# Patient Record
Sex: FEMALE | Race: ASIAN | NOT HISPANIC OR LATINO | Employment: FULL TIME | URBAN - METROPOLITAN AREA
[De-identification: names, ages, dates, MRNs, and addresses within clinical notes are randomized per-mention and may not be internally consistent; named-entity substitution may affect disease eponyms.]

---

## 2018-01-16 ENCOUNTER — OFFICE VISIT - HEALTHEAST (OUTPATIENT)
Dept: FAMILY MEDICINE | Facility: CLINIC | Age: 38
End: 2018-01-16

## 2018-01-16 DIAGNOSIS — S09.90XS HEAD INJURY, CLOSED, SEQUELA: ICD-10-CM

## 2018-01-16 ASSESSMENT — MIFFLIN-ST. JEOR: SCORE: 1586.29

## 2018-05-27 ENCOUNTER — HEALTH MAINTENANCE LETTER (OUTPATIENT)
Age: 38
End: 2018-05-27

## 2020-02-24 ENCOUNTER — COMMUNICATION - HEALTHEAST (OUTPATIENT)
Dept: TELEHEALTH | Facility: CLINIC | Age: 40
End: 2020-02-24

## 2020-02-24 ENCOUNTER — RECORDS - HEALTHEAST (OUTPATIENT)
Dept: GENERAL RADIOLOGY | Facility: CLINIC | Age: 40
End: 2020-02-24

## 2020-02-24 ENCOUNTER — OFFICE VISIT - HEALTHEAST (OUTPATIENT)
Dept: FAMILY MEDICINE | Facility: CLINIC | Age: 40
End: 2020-02-24

## 2020-02-24 DIAGNOSIS — Z00.00 ROUTINE GENERAL MEDICAL EXAMINATION AT A HEALTH CARE FACILITY: ICD-10-CM

## 2020-02-24 DIAGNOSIS — S99.921A FOOT INJURY, RIGHT, INITIAL ENCOUNTER: ICD-10-CM

## 2020-02-24 DIAGNOSIS — Z23 NEED FOR INFLUENZA VACCINATION: ICD-10-CM

## 2020-02-24 DIAGNOSIS — Z11.4 SCREENING FOR HUMAN IMMUNODEFICIENCY VIRUS WITHOUT PRESENCE OF RISK FACTORS: ICD-10-CM

## 2020-02-24 DIAGNOSIS — N89.8 VAGINAL DISCHARGE: ICD-10-CM

## 2020-02-24 DIAGNOSIS — R07.89 ATYPICAL CHEST PAIN: ICD-10-CM

## 2020-02-24 DIAGNOSIS — Z00.00 ENCOUNTER FOR GENERAL ADULT MEDICAL EXAMINATION WITHOUT ABNORMAL FINDINGS: ICD-10-CM

## 2020-02-24 DIAGNOSIS — R07.89 OTHER CHEST PAIN: ICD-10-CM

## 2020-02-24 LAB
ALBUMIN SERPL-MCNC: 3.4 G/DL (ref 3.5–5)
ALP SERPL-CCNC: 72 U/L (ref 45–120)
ALT SERPL W P-5'-P-CCNC: 11 U/L (ref 0–45)
ANION GAP SERPL CALCULATED.3IONS-SCNC: 11 MMOL/L (ref 5–18)
AST SERPL W P-5'-P-CCNC: 12 U/L (ref 0–40)
ATRIAL RATE - MUSE: 74 BPM
BASOPHILS # BLD AUTO: 0 THOU/UL (ref 0–0.2)
BASOPHILS NFR BLD AUTO: 0 % (ref 0–2)
BILIRUB SERPL-MCNC: 0.4 MG/DL (ref 0–1)
BUN SERPL-MCNC: 13 MG/DL (ref 8–22)
CALCIUM SERPL-MCNC: 9.1 MG/DL (ref 8.5–10.5)
CHLORIDE BLD-SCNC: 104 MMOL/L (ref 98–107)
CHOLEST SERPL-MCNC: 158 MG/DL
CLUE CELLS: NORMAL
CO2 SERPL-SCNC: 23 MMOL/L (ref 22–31)
CREAT SERPL-MCNC: 0.76 MG/DL (ref 0.6–1.1)
DIASTOLIC BLOOD PRESSURE - MUSE: NORMAL
EOSINOPHIL # BLD AUTO: 0.1 THOU/UL (ref 0–0.4)
EOSINOPHIL NFR BLD AUTO: 2 % (ref 0–6)
ERYTHROCYTE [DISTWIDTH] IN BLOOD BY AUTOMATED COUNT: 12.9 % (ref 11–14.5)
FASTING STATUS PATIENT QL REPORTED: YES
GFR SERPL CREATININE-BSD FRML MDRD: >60 ML/MIN/1.73M2
GLUCOSE BLD-MCNC: 89 MG/DL (ref 70–125)
HCT VFR BLD AUTO: 35.8 % (ref 35–47)
HDLC SERPL-MCNC: 38 MG/DL
HGB BLD-MCNC: 11.8 G/DL (ref 12–16)
HIV 1+2 AB+HIV1 P24 AG SERPL QL IA: NEGATIVE
INTERPRETATION ECG - MUSE: NORMAL
LDLC SERPL CALC-MCNC: 88 MG/DL
LYMPHOCYTES # BLD AUTO: 1.7 THOU/UL (ref 0.8–4.4)
LYMPHOCYTES NFR BLD AUTO: 26 % (ref 20–40)
MCH RBC QN AUTO: 27.3 PG (ref 27–34)
MCHC RBC AUTO-ENTMCNC: 32.9 G/DL (ref 32–36)
MCV RBC AUTO: 83 FL (ref 80–100)
MONOCYTES # BLD AUTO: 0.4 THOU/UL (ref 0–0.9)
MONOCYTES NFR BLD AUTO: 6 % (ref 2–10)
NEUTROPHILS # BLD AUTO: 4.4 THOU/UL (ref 2–7.7)
NEUTROPHILS NFR BLD AUTO: 66 % (ref 50–70)
P AXIS - MUSE: 26 DEGREES
PLATELET # BLD AUTO: 268 THOU/UL (ref 140–440)
PMV BLD AUTO: 8.3 FL (ref 7–10)
POTASSIUM BLD-SCNC: 4.1 MMOL/L (ref 3.5–5)
PR INTERVAL - MUSE: 150 MS
PROT SERPL-MCNC: 7.2 G/DL (ref 6–8)
QRS DURATION - MUSE: 86 MS
QT - MUSE: 414 MS
QTC - MUSE: 459 MS
R AXIS - MUSE: 16 DEGREES
RBC # BLD AUTO: 4.31 MILL/UL (ref 3.8–5.4)
SODIUM SERPL-SCNC: 138 MMOL/L (ref 136–145)
SYSTOLIC BLOOD PRESSURE - MUSE: NORMAL
T AXIS - MUSE: -6 DEGREES
TRICHOMONAS, WET PREP: NORMAL
TRIGL SERPL-MCNC: 158 MG/DL
TSH SERPL DL<=0.005 MIU/L-ACNC: 1.49 UIU/ML (ref 0.3–5)
VENTRICULAR RATE- MUSE: 74 BPM
WBC: 6.6 THOU/UL (ref 4–11)
YEAST, WET PREP: NORMAL

## 2020-02-24 ASSESSMENT — MIFFLIN-ST. JEOR: SCORE: 1517.97

## 2020-02-25 ENCOUNTER — COMMUNICATION - HEALTHEAST (OUTPATIENT)
Dept: FAMILY MEDICINE | Facility: CLINIC | Age: 40
End: 2020-02-25

## 2020-02-25 LAB
HPV SOURCE: NORMAL
HUMAN PAPILLOMA VIRUS 16 DNA: NEGATIVE
HUMAN PAPILLOMA VIRUS 18 DNA: NEGATIVE
HUMAN PAPILLOMA VIRUS FINAL DIAGNOSIS: NORMAL
HUMAN PAPILLOMA VIRUS OTHER HR: NEGATIVE
SPECIMEN DESCRIPTION: NORMAL

## 2020-03-02 ENCOUNTER — COMMUNICATION - HEALTHEAST (OUTPATIENT)
Dept: FAMILY MEDICINE | Facility: CLINIC | Age: 40
End: 2020-03-02

## 2020-03-02 LAB
BKR LAB AP ABNORMAL BLEEDING: NO
BKR LAB AP BIRTH CONTROL/HORMONES: NORMAL
BKR LAB AP CERVICAL APPEARANCE: NORMAL
BKR LAB AP GYN ADEQUACY: NORMAL
BKR LAB AP GYN INTERPRETATION: NORMAL
BKR LAB AP HPV REFLEX: NORMAL
BKR LAB AP LMP: NORMAL
BKR LAB AP PATIENT STATUS: NORMAL
BKR LAB AP PREVIOUS ABNORMAL: NORMAL
BKR LAB AP PREVIOUS NORMAL: 2015
HIGH RISK?: NO
PATH REPORT.COMMENTS IMP SPEC: NORMAL
RESULT FLAG (HE HISTORICAL CONVERSION): NORMAL

## 2020-06-04 ENCOUNTER — AMBULATORY - HEALTHEAST (OUTPATIENT)
Dept: FAMILY MEDICINE | Facility: CLINIC | Age: 40
End: 2020-06-04

## 2020-06-04 ENCOUNTER — COMMUNICATION - HEALTHEAST (OUTPATIENT)
Dept: SCHEDULING | Facility: CLINIC | Age: 40
End: 2020-06-04

## 2020-06-04 DIAGNOSIS — Z20.822 EXPOSURE TO 2019 NOVEL CORONAVIRUS: ICD-10-CM

## 2020-06-08 ENCOUNTER — AMBULATORY - HEALTHEAST (OUTPATIENT)
Dept: FAMILY MEDICINE | Facility: CLINIC | Age: 40
End: 2020-06-08

## 2020-06-08 DIAGNOSIS — Z20.822 EXPOSURE TO 2019 NOVEL CORONAVIRUS: ICD-10-CM

## 2020-06-10 ENCOUNTER — COMMUNICATION - HEALTHEAST (OUTPATIENT)
Dept: FAMILY MEDICINE | Facility: CLINIC | Age: 40
End: 2020-06-10

## 2020-08-10 ENCOUNTER — COMMUNICATION - HEALTHEAST (OUTPATIENT)
Dept: SCHEDULING | Facility: CLINIC | Age: 40
End: 2020-08-10

## 2020-08-17 ENCOUNTER — OFFICE VISIT - HEALTHEAST (OUTPATIENT)
Dept: FAMILY MEDICINE | Facility: CLINIC | Age: 40
End: 2020-08-17

## 2020-08-17 DIAGNOSIS — R79.89 ELEVATED TSH: ICD-10-CM

## 2020-08-17 DIAGNOSIS — R06.83 SNORING: ICD-10-CM

## 2020-08-17 DIAGNOSIS — R03.0 ELEVATED BLOOD PRESSURE READING WITHOUT DIAGNOSIS OF HYPERTENSION: ICD-10-CM

## 2020-08-17 DIAGNOSIS — R07.89 ATYPICAL CHEST PAIN: ICD-10-CM

## 2020-08-17 DIAGNOSIS — F43.23 ADJUSTMENT DISORDER WITH MIXED ANXIETY AND DEPRESSED MOOD: ICD-10-CM

## 2020-08-17 DIAGNOSIS — E66.01 MORBID OBESITY (H): ICD-10-CM

## 2020-08-17 LAB
ALBUMIN SERPL-MCNC: 3.7 G/DL (ref 3.5–5)
ALP SERPL-CCNC: 71 U/L (ref 45–120)
ALT SERPL W P-5'-P-CCNC: 12 U/L (ref 0–45)
ANION GAP SERPL CALCULATED.3IONS-SCNC: 10 MMOL/L (ref 5–18)
AST SERPL W P-5'-P-CCNC: 14 U/L (ref 0–40)
BILIRUB SERPL-MCNC: 0.6 MG/DL (ref 0–1)
BUN SERPL-MCNC: 11 MG/DL (ref 8–22)
CALCIUM SERPL-MCNC: 8.6 MG/DL (ref 8.5–10.5)
CHLORIDE BLD-SCNC: 107 MMOL/L (ref 98–107)
CO2 SERPL-SCNC: 25 MMOL/L (ref 22–31)
CREAT SERPL-MCNC: 0.82 MG/DL (ref 0.6–1.1)
GFR SERPL CREATININE-BSD FRML MDRD: >60 ML/MIN/1.73M2
GLUCOSE BLD-MCNC: 96 MG/DL (ref 70–125)
POTASSIUM BLD-SCNC: 4.1 MMOL/L (ref 3.5–5)
PROT SERPL-MCNC: 7.6 G/DL (ref 6–8)
SODIUM SERPL-SCNC: 142 MMOL/L (ref 136–145)
TSH SERPL DL<=0.005 MIU/L-ACNC: 4.97 UIU/ML (ref 0.3–5)

## 2020-08-17 ASSESSMENT — ANXIETY QUESTIONNAIRES
3. WORRYING TOO MUCH ABOUT DIFFERENT THINGS: SEVERAL DAYS
1. FEELING NERVOUS, ANXIOUS, OR ON EDGE: SEVERAL DAYS
GAD7 TOTAL SCORE: 8
IF YOU CHECKED OFF ANY PROBLEMS ON THIS QUESTIONNAIRE, HOW DIFFICULT HAVE THESE PROBLEMS MADE IT FOR YOU TO DO YOUR WORK, TAKE CARE OF THINGS AT HOME, OR GET ALONG WITH OTHER PEOPLE: VERY DIFFICULT
5. BEING SO RESTLESS THAT IT IS HARD TO SIT STILL: SEVERAL DAYS
7. FEELING AFRAID AS IF SOMETHING AWFUL MIGHT HAPPEN: NEARLY EVERY DAY
6. BECOMING EASILY ANNOYED OR IRRITABLE: NOT AT ALL
4. TROUBLE RELAXING: SEVERAL DAYS
2. NOT BEING ABLE TO STOP OR CONTROL WORRYING: SEVERAL DAYS

## 2020-08-17 ASSESSMENT — PATIENT HEALTH QUESTIONNAIRE - PHQ9: SUM OF ALL RESPONSES TO PHQ QUESTIONS 1-9: 9

## 2020-08-18 ENCOUNTER — AMBULATORY - HEALTHEAST (OUTPATIENT)
Dept: CARDIOLOGY | Facility: CLINIC | Age: 40
End: 2020-08-18

## 2020-08-18 ENCOUNTER — COMMUNICATION - HEALTHEAST (OUTPATIENT)
Dept: CARDIOLOGY | Facility: CLINIC | Age: 40
End: 2020-08-18

## 2020-08-19 ENCOUNTER — OFFICE VISIT - HEALTHEAST (OUTPATIENT)
Dept: CARDIOLOGY | Facility: CLINIC | Age: 40
End: 2020-08-19

## 2020-08-19 DIAGNOSIS — I25.9 CHEST PAIN DUE TO MYOCARDIAL ISCHEMIA, UNSPECIFIED ISCHEMIC CHEST PAIN TYPE: ICD-10-CM

## 2020-08-19 ASSESSMENT — MIFFLIN-ST. JEOR: SCORE: 1508.9

## 2020-08-27 ENCOUNTER — HOSPITAL ENCOUNTER (OUTPATIENT)
Dept: CT IMAGING | Facility: CLINIC | Age: 40
Discharge: HOME | End: 2020-08-27
Attending: INTERNAL MEDICINE

## 2020-08-27 DIAGNOSIS — I25.9 CHEST PAIN DUE TO MYOCARDIAL ISCHEMIA, UNSPECIFIED ISCHEMIC CHEST PAIN TYPE: ICD-10-CM

## 2020-08-27 LAB
BSA FOR ECHO PROCEDURE: 1.96 M2
CV CALCIUM SCORE AGATSTON LM: 0
CV CALCIUM SCORING AGATSON LAD: 0
CV CALCIUM SCORING AGATSTON CX: 0
CV CALCIUM SCORING AGATSTON RCA: 0
CV CALCIUM SCORING AGATSTON TOTAL: 0
LEFT VENTRICLE HEART RATE: 75 BPM

## 2020-08-27 ASSESSMENT — MIFFLIN-ST. JEOR: SCORE: 1503.23

## 2020-09-08 ENCOUNTER — OFFICE VISIT - HEALTHEAST (OUTPATIENT)
Dept: FAMILY MEDICINE | Facility: CLINIC | Age: 40
End: 2020-09-08

## 2020-09-08 ENCOUNTER — COMMUNICATION - HEALTHEAST (OUTPATIENT)
Dept: FAMILY MEDICINE | Facility: CLINIC | Age: 40
End: 2020-09-08

## 2020-09-08 DIAGNOSIS — R94.6 THYROID FUNCTION TEST ABNORMAL: ICD-10-CM

## 2020-09-08 DIAGNOSIS — F43.23 ADJUSTMENT DISORDER WITH MIXED ANXIETY AND DEPRESSED MOOD: ICD-10-CM

## 2020-09-14 ENCOUNTER — OFFICE VISIT - HEALTHEAST (OUTPATIENT)
Dept: BEHAVIORAL HEALTH | Facility: CLINIC | Age: 40
End: 2020-09-14

## 2020-09-14 DIAGNOSIS — F41.0 PANIC ATTACKS: ICD-10-CM

## 2020-09-14 DIAGNOSIS — F41.1 GENERALIZED ANXIETY DISORDER: ICD-10-CM

## 2020-09-14 ASSESSMENT — ANXIETY QUESTIONNAIRES
4. TROUBLE RELAXING: NOT AT ALL
IF YOU CHECKED OFF ANY PROBLEMS ON THIS QUESTIONNAIRE, HOW DIFFICULT HAVE THESE PROBLEMS MADE IT FOR YOU TO DO YOUR WORK, TAKE CARE OF THINGS AT HOME, OR GET ALONG WITH OTHER PEOPLE: NOT DIFFICULT AT ALL
3. WORRYING TOO MUCH ABOUT DIFFERENT THINGS: SEVERAL DAYS
1. FEELING NERVOUS, ANXIOUS, OR ON EDGE: SEVERAL DAYS
5. BEING SO RESTLESS THAT IT IS HARD TO SIT STILL: NOT AT ALL
GAD7 TOTAL SCORE: 6
6. BECOMING EASILY ANNOYED OR IRRITABLE: NOT AT ALL
7. FEELING AFRAID AS IF SOMETHING AWFUL MIGHT HAPPEN: NEARLY EVERY DAY
2. NOT BEING ABLE TO STOP OR CONTROL WORRYING: SEVERAL DAYS

## 2020-09-14 ASSESSMENT — PATIENT HEALTH QUESTIONNAIRE - PHQ9: SUM OF ALL RESPONSES TO PHQ QUESTIONS 1-9: 0

## 2020-10-12 ENCOUNTER — COMMUNICATION - HEALTHEAST (OUTPATIENT)
Dept: FAMILY MEDICINE | Facility: CLINIC | Age: 40
End: 2020-10-12

## 2020-10-12 DIAGNOSIS — R03.0 ELEVATED BLOOD PRESSURE READING WITHOUT DIAGNOSIS OF HYPERTENSION: ICD-10-CM

## 2020-10-21 ENCOUNTER — AMBULATORY - HEALTHEAST (OUTPATIENT)
Dept: NURSING | Facility: CLINIC | Age: 40
End: 2020-10-21

## 2020-10-21 ENCOUNTER — AMBULATORY - HEALTHEAST (OUTPATIENT)
Dept: LAB | Facility: CLINIC | Age: 40
End: 2020-10-21

## 2020-10-21 DIAGNOSIS — R94.6 THYROID FUNCTION TEST ABNORMAL: ICD-10-CM

## 2020-10-21 LAB
T3 SERPL-MCNC: 57 NG/DL (ref 45–175)
T4 FREE SERPL-MCNC: 0.9 NG/DL (ref 0.7–1.8)
TSH SERPL DL<=0.005 MIU/L-ACNC: 3.8 UIU/ML (ref 0.3–5)

## 2020-10-22 LAB — THYROID PEROXIDASE ANTIBODIES - HISTORICAL: 315.2 IU/ML (ref 0–5.6)

## 2020-10-26 ENCOUNTER — COMMUNICATION - HEALTHEAST (OUTPATIENT)
Dept: FAMILY MEDICINE | Facility: CLINIC | Age: 40
End: 2020-10-26

## 2020-10-26 DIAGNOSIS — E06.3 HASHIMOTO'S THYROIDITIS: ICD-10-CM

## 2020-11-12 ENCOUNTER — COMMUNICATION - HEALTHEAST (OUTPATIENT)
Dept: FAMILY MEDICINE | Facility: CLINIC | Age: 40
End: 2020-11-12

## 2020-11-20 ENCOUNTER — COMMUNICATION - HEALTHEAST (OUTPATIENT)
Dept: SCHEDULING | Facility: CLINIC | Age: 40
End: 2020-11-20

## 2020-11-30 ENCOUNTER — OFFICE VISIT - HEALTHEAST (OUTPATIENT)
Dept: FAMILY MEDICINE | Facility: CLINIC | Age: 40
End: 2020-11-30

## 2020-11-30 DIAGNOSIS — E06.3 HASHIMOTO'S THYROIDITIS: ICD-10-CM

## 2020-11-30 DIAGNOSIS — F43.23 ADJUSTMENT DISORDER WITH MIXED ANXIETY AND DEPRESSED MOOD: ICD-10-CM

## 2020-11-30 ASSESSMENT — ANXIETY QUESTIONNAIRES
4. TROUBLE RELAXING: NEARLY EVERY DAY
IF YOU CHECKED OFF ANY PROBLEMS ON THIS QUESTIONNAIRE, HOW DIFFICULT HAVE THESE PROBLEMS MADE IT FOR YOU TO DO YOUR WORK, TAKE CARE OF THINGS AT HOME, OR GET ALONG WITH OTHER PEOPLE: SOMEWHAT DIFFICULT
6. BECOMING EASILY ANNOYED OR IRRITABLE: NOT AT ALL
1. FEELING NERVOUS, ANXIOUS, OR ON EDGE: SEVERAL DAYS
3. WORRYING TOO MUCH ABOUT DIFFERENT THINGS: NEARLY EVERY DAY
7. FEELING AFRAID AS IF SOMETHING AWFUL MIGHT HAPPEN: NEARLY EVERY DAY
GAD7 TOTAL SCORE: 12
5. BEING SO RESTLESS THAT IT IS HARD TO SIT STILL: SEVERAL DAYS
2. NOT BEING ABLE TO STOP OR CONTROL WORRYING: SEVERAL DAYS

## 2020-11-30 ASSESSMENT — PATIENT HEALTH QUESTIONNAIRE - PHQ9: SUM OF ALL RESPONSES TO PHQ QUESTIONS 1-9: 11

## 2020-12-02 ENCOUNTER — AMBULATORY - HEALTHEAST (OUTPATIENT)
Dept: LAB | Facility: CLINIC | Age: 40
End: 2020-12-02

## 2020-12-02 DIAGNOSIS — E06.3 HASHIMOTO'S THYROIDITIS: ICD-10-CM

## 2020-12-02 LAB
T4 FREE SERPL-MCNC: 1 NG/DL (ref 0.7–1.8)
TSH SERPL DL<=0.005 MIU/L-ACNC: 7.67 UIU/ML (ref 0.3–5)

## 2020-12-06 ENCOUNTER — COMMUNICATION - HEALTHEAST (OUTPATIENT)
Dept: FAMILY MEDICINE | Facility: CLINIC | Age: 40
End: 2020-12-06

## 2020-12-14 ENCOUNTER — AMBULATORY - HEALTHEAST (OUTPATIENT)
Dept: NURSING | Facility: CLINIC | Age: 40
End: 2020-12-14

## 2020-12-14 DIAGNOSIS — Z01.30 BLOOD PRESSURE CHECK: ICD-10-CM

## 2020-12-14 ASSESSMENT — MIFFLIN-ST. JEOR: SCORE: 1428.39

## 2020-12-16 ENCOUNTER — AMBULATORY - HEALTHEAST (OUTPATIENT)
Dept: LAB | Facility: CLINIC | Age: 40
End: 2020-12-16

## 2020-12-16 DIAGNOSIS — E06.3 HASHIMOTO'S THYROIDITIS: ICD-10-CM

## 2020-12-18 ENCOUNTER — COMMUNICATION - HEALTHEAST (OUTPATIENT)
Dept: FAMILY MEDICINE | Facility: CLINIC | Age: 40
End: 2020-12-18

## 2020-12-20 LAB
CATECHOLS UR-IMP: NORMAL
COLLECT DURATION TIME SPEC: 24 HR
COLLECT DURATION TIME SPEC: 24 HR
CREAT 24H UR-MRATE: 1224 MG/D (ref 700–1600)
CREAT 24H UR-MRATE: 1224 MG/D (ref 700–1600)
CREAT UR-MCNC: 79 MG/DL
CREAT UR-MCNC: 79 MG/DL
DOPAMINE 24H UR-MRATE: 186 UG/D (ref 71–485)
DOPAMINE UR-MCNC: 120 UG/L
DOPAMINE/CREAT UR: 152 UG/G CRT (ref 0–250)
EPINEPH 24H UR-MRATE: 3 UG/D (ref 1–14)
EPINEPH UR-MCNC: 2 UG/L
EPINEPH/CREAT UR: 3 UG/G CRT (ref 0–20)
METANEPH 24H UR-MCNC: 58 UG/L
METANEPH 24H UR-MRATE: 90 UG/D (ref 36–229)
METANEPH+NORMETANEPH UR-IMP: NORMAL
METANEPH/CREAT 24H UR: 73 UG/G CRT (ref 0–300)
NOREPINEPH 24H UR-MRATE: 33 UG/D (ref 14–120)
NOREPINEPH UR-MCNC: 21 UG/L
NOREPINEPH/CREAT UR: 27 UG/G CRT (ref 0–45)
NORMETANEPHRINE 24H UR-MCNC: 146 UG/L
NORMETANEPHRINE 24H UR-MRATE: 226 UG/D (ref 95–650)
NORMETANEPHRINE/CREAT 24H UR: 185 UG/G CRT (ref 0–400)
SPECIMEN VOL ?TM UR: 1550 ML
SPECIMEN VOL ?TM UR: 1550 ML

## 2020-12-21 ENCOUNTER — COMMUNICATION - HEALTHEAST (OUTPATIENT)
Dept: FAMILY MEDICINE | Facility: CLINIC | Age: 40
End: 2020-12-21

## 2020-12-21 DIAGNOSIS — E06.3 HASHIMOTO'S THYROIDITIS: ICD-10-CM

## 2020-12-22 ENCOUNTER — COMMUNICATION - HEALTHEAST (OUTPATIENT)
Dept: ADMINISTRATIVE | Facility: CLINIC | Age: 40
End: 2020-12-22

## 2021-01-18 ENCOUNTER — OFFICE VISIT - HEALTHEAST (OUTPATIENT)
Dept: FAMILY MEDICINE | Facility: CLINIC | Age: 41
End: 2021-01-18

## 2021-01-18 DIAGNOSIS — E66.01 MORBID OBESITY (H): ICD-10-CM

## 2021-01-18 DIAGNOSIS — L65.9 HAIR LOSS: ICD-10-CM

## 2021-01-18 DIAGNOSIS — Z11.59 ENCOUNTER FOR HCV SCREENING TEST FOR LOW RISK PATIENT: ICD-10-CM

## 2021-01-18 DIAGNOSIS — E06.3 HASHIMOTO'S THYROIDITIS: ICD-10-CM

## 2021-01-18 ASSESSMENT — MIFFLIN-ST. JEOR: SCORE: 1404

## 2021-01-21 ENCOUNTER — AMBULATORY - HEALTHEAST (OUTPATIENT)
Dept: LAB | Facility: CLINIC | Age: 41
End: 2021-01-21

## 2021-01-21 DIAGNOSIS — L65.9 LOSS OF HAIR: ICD-10-CM

## 2021-01-21 LAB
FERRITIN SERPL-MCNC: 10 NG/ML (ref 10–130)
FOLATE SERPL-MCNC: 11.6 NG/ML
VIT B12 SERPL-MCNC: 347 PG/ML (ref 213–816)

## 2021-01-22 LAB
25(OH)D3 SERPL-MCNC: 45.7 NG/ML (ref 30–80)
ANA SER QL: 0.3 U

## 2021-01-23 LAB — DHEA-S SERPL-MCNC: 148 UG/DL (ref 32–240)

## 2021-01-24 LAB
ANDROST SERPL-MCNC: 0.97 NG/ML (ref 0.13–0.82)
ZINC SERPL-MCNC: 73 UG/DL (ref 60–120)

## 2021-01-27 LAB
SHBG SERPL-SCNC: 26 NMOL/L (ref 30–135)
TESTOST FREE SERPL-MCNC: 0.34 NG/DL (ref 0.13–0.92)
TESTOST SERPL-MCNC: 17 NG/DL (ref 8–60)

## 2021-02-16 ENCOUNTER — OFFICE VISIT - HEALTHEAST (OUTPATIENT)
Dept: ENDOCRINOLOGY | Facility: CLINIC | Age: 41
End: 2021-02-16

## 2021-02-16 DIAGNOSIS — E06.3 HASHIMOTO'S THYROIDITIS: ICD-10-CM

## 2021-02-19 ENCOUNTER — AMBULATORY - HEALTHEAST (OUTPATIENT)
Dept: ENDOCRINOLOGY | Facility: CLINIC | Age: 41
End: 2021-02-19

## 2021-02-19 DIAGNOSIS — E06.3 HASHIMOTO'S THYROIDITIS: ICD-10-CM

## 2021-03-02 ENCOUNTER — AMBULATORY - HEALTHEAST (OUTPATIENT)
Dept: LAB | Facility: CLINIC | Age: 41
End: 2021-03-02

## 2021-03-02 DIAGNOSIS — E06.3 HASHIMOTO'S THYROIDITIS: ICD-10-CM

## 2021-03-02 LAB
CREAT SERPL-MCNC: 0.93 MG/DL (ref 0.6–1.1)
GFR SERPL CREATININE-BSD FRML MDRD: >60 ML/MIN/1.73M2
POTASSIUM BLD-SCNC: 4.4 MMOL/L (ref 3.5–5)
T4 FREE SERPL-MCNC: 1 NG/DL (ref 0.7–1.8)
TSH SERPL DL<=0.005 MIU/L-ACNC: 1.26 UIU/ML (ref 0.3–5)

## 2021-03-03 LAB
25(OH)D3 SERPL-MCNC: 41.6 NG/ML (ref 30–80)
25(OH)D3 SERPL-MCNC: 41.6 NG/ML (ref 30–80)

## 2021-03-10 ENCOUNTER — OFFICE VISIT - HEALTHEAST (OUTPATIENT)
Dept: FAMILY MEDICINE | Facility: CLINIC | Age: 41
End: 2021-03-10

## 2021-03-10 DIAGNOSIS — N39.0 URINARY TRACT INFECTION: ICD-10-CM

## 2021-03-10 LAB
ALBUMIN UR-MCNC: ABNORMAL G/DL
APPEARANCE UR: ABNORMAL
BILIRUB UR QL STRIP: ABNORMAL
COLOR UR AUTO: YELLOW
GLUCOSE UR STRIP-MCNC: NEGATIVE MG/DL
HGB UR QL STRIP: ABNORMAL
KETONES UR STRIP-MCNC: ABNORMAL MG/DL
LEUKOCYTE ESTERASE UR QL STRIP: ABNORMAL
NITRATE UR QL: POSITIVE
PH UR STRIP: 6 [PH] (ref 5–8)
SP GR UR STRIP: 1.02 (ref 1–1.03)
UROBILINOGEN UR STRIP-ACNC: ABNORMAL

## 2021-03-13 LAB
BACTERIA SPEC CULT: ABNORMAL
BACTERIA SPEC CULT: ABNORMAL

## 2021-04-30 ENCOUNTER — AMBULATORY - HEALTHEAST (OUTPATIENT)
Dept: NURSING | Facility: CLINIC | Age: 41
End: 2021-04-30

## 2021-05-21 ENCOUNTER — AMBULATORY - HEALTHEAST (OUTPATIENT)
Dept: NURSING | Facility: CLINIC | Age: 41
End: 2021-05-21

## 2021-05-27 ASSESSMENT — PATIENT HEALTH QUESTIONNAIRE - PHQ9
SUM OF ALL RESPONSES TO PHQ QUESTIONS 1-9: 9
SUM OF ALL RESPONSES TO PHQ QUESTIONS 1-9: 0
SUM OF ALL RESPONSES TO PHQ QUESTIONS 1-9: 11

## 2021-05-28 ASSESSMENT — ANXIETY QUESTIONNAIRES
GAD7 TOTAL SCORE: 12
GAD7 TOTAL SCORE: 8
GAD7 TOTAL SCORE: 6

## 2021-05-31 VITALS — BODY MASS INDEX: 43.4 KG/M2 | HEIGHT: 60 IN | WEIGHT: 221.06 LBS

## 2021-06-04 VITALS
TEMPERATURE: 98 F | SYSTOLIC BLOOD PRESSURE: 118 MMHG | HEART RATE: 62 BPM | DIASTOLIC BLOOD PRESSURE: 84 MMHG | BODY MASS INDEX: 40.31 KG/M2 | WEIGHT: 203 LBS

## 2021-06-04 VITALS — WEIGHT: 201 LBS | HEIGHT: 60 IN | BODY MASS INDEX: 39.46 KG/M2

## 2021-06-04 VITALS
RESPIRATION RATE: 16 BRPM | DIASTOLIC BLOOD PRESSURE: 88 MMHG | TEMPERATURE: 97.9 F | BODY MASS INDEX: 40.44 KG/M2 | WEIGHT: 206 LBS | HEART RATE: 82 BPM | HEIGHT: 60 IN | SYSTOLIC BLOOD PRESSURE: 122 MMHG

## 2021-06-04 VITALS
RESPIRATION RATE: 16 BRPM | BODY MASS INDEX: 40.05 KG/M2 | HEART RATE: 74 BPM | WEIGHT: 204 LBS | SYSTOLIC BLOOD PRESSURE: 106 MMHG | OXYGEN SATURATION: 98 % | HEIGHT: 60 IN | DIASTOLIC BLOOD PRESSURE: 80 MMHG

## 2021-06-05 VITALS
SYSTOLIC BLOOD PRESSURE: 100 MMHG | HEART RATE: 80 BPM | WEIGHT: 180 LBS | DIASTOLIC BLOOD PRESSURE: 60 MMHG | BODY MASS INDEX: 35.34 KG/M2 | HEIGHT: 60 IN

## 2021-06-05 VITALS
SYSTOLIC BLOOD PRESSURE: 112 MMHG | BODY MASS INDEX: 37.9 KG/M2 | TEMPERATURE: 97.5 F | DIASTOLIC BLOOD PRESSURE: 77 MMHG | WEIGHT: 188 LBS | HEIGHT: 59 IN | HEART RATE: 76 BPM

## 2021-06-05 VITALS
DIASTOLIC BLOOD PRESSURE: 79 MMHG | RESPIRATION RATE: 16 BRPM | OXYGEN SATURATION: 99 % | SYSTOLIC BLOOD PRESSURE: 112 MMHG | BODY MASS INDEX: 34.07 KG/M2 | TEMPERATURE: 98 F | WEIGHT: 173 LBS | HEART RATE: 73 BPM

## 2021-06-06 NOTE — TELEPHONE ENCOUNTER
----- Message from Savanna Beal MD sent at 2/24/2020  8:22 PM CST -----  Please let patient know that her chest xray is okay.  Her foot xray shows some swelling in the area of pain, but no broken bones to account for the pain.  This should continue to improve.
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
## 2021-06-06 NOTE — TELEPHONE ENCOUNTER
Called and spoke with Anish Hauser, Message was given, Anish Hauser understood, no further questions.

## 2021-06-08 NOTE — TELEPHONE ENCOUNTER
Called and spoke with Anish Hauser , Message was given, Anish Hauser  understood, no further questions.
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
## 2021-06-10 NOTE — TELEPHONE ENCOUNTER
Bp 158/113 now.    Feeling not well and debating on going to the ED.    I advised her that is the rate where we do send people to the ED so she says she is going to the ED.    Joaquina Chong RN [facility] Triage Nurse Advisor    Additional Information    Negative: Sounds like a life-threatening emergency to the triager    Negative: Pregnant > 20 weeks or postpartum (< 6 weeks after delivery) and new hand or face swelling    Negative: Pregnant > 20 weeks and BP > 140/90    Systolic BP >= 160 OR Diastolic >= 100, and any cardiac or neurologic symptoms (e.g., chest pain, difficulty breathing, unsteady gait, blurred vision)    Protocols used: HIGH BLOOD PRESSURE-A-OH

## 2021-06-10 NOTE — PATIENT INSTRUCTIONS - HE
Consult with heart doctor, sleep doctor    Start mental health therapy to figure out how to take better care of yourself    Take hydroxyzine every night for next 2 weeks.     Start the metoprolol to help lower blood pressure a little bit and may help with chest pain and anxiety    We will check on thyroid levels today

## 2021-06-10 NOTE — TELEPHONE ENCOUNTER
8/19/2020 10:30 AM (40 min)   [place]     Arrive by: 10:15 AM    CONSULT    [facility] [[facility]]    Christiane Whalen MD       Wellness Screening Tool  Symptom Screening:  Do you have one of the following NEW symptoms:    Fever (subjective or >100.0)?  No    A new cough?  No    Shortness of breath?  No     Chills? No     New loss of taste or smell? No     Generalized body aches? No     New persistent headache? No     New sore throat? No     Nausea, vomiting, or diarrhea?  No    Within the past 3 weeks, have you been exposed to someone with a known positive illness below:    COVID-19 (known or suspected)?  No    Chicken pox?  No    Mealses?  No    Pertussis?  No    Patient notified of visitor policy- They may have one person accompany them to their appointment, but they will need to wear a mask and will be screened upon arrival for symptoms: Yes  Pt informed to wear a mask: Yes  Pt notified if they develop any symptoms listed above, prior to their appointment, they are to call the clinic directly at [phone] for further instructions.  Yes  Patient's appointment status: Patient will be seen in clinic as scheduled on 8/19/20 at [facility] at 10:30 AM.  -fiona

## 2021-06-10 NOTE — PROGRESS NOTES
[facility] IN-OFFICE Visit    Chief Complaint:  Chief Complaint   Patient presents with     Hospital Visit Follow Up     chest pain, anxiety, high blood pressure seen [facility] 8/10/20         Assessment/Plan:  1. Atypical chest pain  Risks for CVD include morbid obesity- pt however is 41yo, non smoker, no diabetes.  May have HTN but will continue to monitor as below.  Most likely related to anxiety and gerd.  F/u on abnormal TSH as contributing to her sx.  F/u with cardiology per pt request due to anxiety of heart problems causing her sx.   - Ambulatory referral to Cardiology    2. Elevated TSH  Normal earlier this year.  Recheck again today- may have been an acute phase reaction to stress, etc.  May be contributing to her current symptoms is abnormal.    - Comprehensive Metabolic Panel  - Thyroid Cascade    3. Morbid obesity (H)  Pt certainly at risk for SOLA that may be contributing to some heart symptoms and elevated bp.  Appreciate referral for sleep study as needed.    - Ambulatory referral to Sleep Medicine    4. Snoring  As above.   - Ambulatory referral to Sleep Medicine    5. Adjustment disorder with mixed anxiety and depressed mood  Pt willing to work with therapist at this poitn to help with anxiety and coping techniques with her current heart symptoms.    - AMB REFERRAL TO MENTAL HEALTH AND ADDICTION  - Adult (18+); Outpatient Treatment; Individual/Couples/Family/Group Therapy/Health Psychology; [facility]; [facility]; We will contact you to schedule the appointment or pleas...    6. Elevated blood pressure reading without diagnosis of hypertension  bp elevated on and off over past several visits. Normal today.  With her heart symptoms and anxiety will start low dose b-blocker as below per pt request to take something for her bp that she is seeing high at home. Pt to bring her meter in with her to next in office visit to see if it is accurate.     - metoprolol succinate (TOPROL-XL) 25 MG; Take 1 tablet (25 mg total) by mouth daily.  Dispense: 30 tablet; Refill: 1      Return in about 2 weeks (around 8/31/2020) for Virtual Visit.  The following high BMI interventions were performed this visit: encouragement to exercise and lifestyle education regarding diet    Patient Education/AVS:  Patient Instructions   Consult with heart doctor, sleep doctor    Start mental health therapy to figure out how to take better care of yourself    Take hydroxyzine every night for next 2 weeks.     Start the metoprolol to help lower blood pressure a little bit and may help with chest pain and anxiety    We will check on thyroid levels today       HPI:   Anish Hauser is a 40 y.o. female c/o ED f/u because not feeling well and having chest pain.      Pt notes that starting in 2017 she would get chest pain that comes and goes.  Sometimes comes with eating, sometimes at rest, sometimes in middle of the night and sometimes wakes her up first thing in the morning.  Never strong enough to stop her activity.  Can be short and sometimes will last for hours or days.  In 2018 had some chest pain and woke up in middle of the night gasping for air like she had stopped breathing, got flushed and sweaty. Went to ED at that time and told everything was okay.  Ever since then has been getting anxiety but not that bad until 2 weeks ago.  Notes she is scared of dying.  Notes that whenever she goes to the ED she has high blood pressure but comes down.  Pt notes she goes to [employer] to check her bp 130-140s/90s in this setting.  Whenever she gets checked in clinic she is told it is borderline but no meds needed.  Has had several EKGs and told it's okay.      Over past 2 weeks has been getting chest pains back again.  Woke up gasping for air, noted her heart was racing, felt flushed.  Wrist bp at home 158/110 at home and advised to go to ED for evaluation based on her sx.  After ED she went home and  then got really flushed and tingling and cold sensation that radiated to her mid chest so went back to ED.  Told that she may be having anxiety.  Did get anxiety pills and did take a couple times.  Had a panic attack this morning- went to bed at midnight due to working late.  Woke up and started thinking about dying- got a dry mouth, chills and shaking and got very cold and couldn't calm her body down.  Her pulse was normal according to her fit bit even though her heart felt like it was racing.  Did take the anxiety pill and it helped her sleep.  Notes she has trouble shutting off her brain and struggles with good sleep. Does wake gasping for air frequently and then afraid she is going to die in her sleep.  Wondering about sleep apnea.  Mom and grandma both have htn.  Trying to eat healthier and exercise regularly. 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  Single and anxious about dying.  Does get hard and bloody stools for several days at a time.  Notes period was off for a while but better monthly for past year. Not sexually active right now.  Hair loss over past several years.  Skin and nails seem healthy.      Has taken PPI daily and chest pain still comes and goes.      Has been checking wrist bp 130-150s/80-100s    ROS:  Constitutional, ENT, CV, Resp, GI, , MSK, skin, neuro, psych all negative except as outlined in the HPI above and patient denies any other symptoms.      History summarized from1-2:ED visit 810 and 8/11/20 reviewed at [facility]: seen for chest pain and unremarkable workup.  High blood pressure noted- asked to f/u to make sure situational and not chronic problem.  2nd evaluation rx for hydroxyzine to help with anxiety and sleep. Asked to f/u with endocrine regarding her flushing sx and f/u with pcp.  Consider cardiology f/u.    Old Records-1: Outside allergies, meds, problems and immunizations were reconciled as needed  Radiology- normal CXR 8/2020  Lab tests reviewed-1: 8/10/20  TSJ 19.45 with free t4  0.7  Medicine tests reviewed-1: EKG 8/2020- nonspecific t wave abnormality.  No changes since 2018.     Health Maintenance reviewed and ordered as appropriate as part of shared decision making with patient.     Social History     Tobacco Use   Smoking Status Former Smoker   Smokeless Tobacco Former User     Quit date: 8/12/2014   Tobacco Comment    No exposure to second hand smoking.      Social History     Substance and Sexual Activity   Sexual Activity Not Currently     Birth control/protection: None    Comment: Does not have a partner at the moment     Social History     Social History Narrative    ; 4 children - (23, 21, 17, 14)       Physical Exam:  /84 (Patient Site: Right Arm, Patient Position: Sitting, Cuff Size: Adult Large)   Pulse 62   Temp 98  F (36.7  C) (Oral)   Wt 203 lb (92.1 kg)   LMP 08/16/2020 (Exact Date)   BMI 40.31 kg/m   Body mass index is 40.31 kg/m . Patient's last menstrual period was 08/16/2020 (exact date).  Vital signs reviewed  Wt Readings from Last 3 Encounters:   08/19/20 204 lb (92.5 kg)   08/17/20 203 lb (92.1 kg)   02/24/20 206 lb (93.4 kg)     KALEE 7 Total Score: 8 (8/17/2020 12:00 PM)    PHQ-9 Total Score: 9 (8/17/2020 12:00 PM)    PHQ-2 Total Score: 2 (8/17/2020 12:00 PM)    No data recorded    All normal as below except abnormalities include: pt teary at time.  No swelling.  Good eye contact.  Evidence of healthy grooming.    General is a  40 y.o. female sitting comfortably in no apparent distress wearing a mask.  Neck: Supple without lymphadenopathy or thyromegally  CV: Regular rate and rhythm S1S2 without rubs, murmurs or gallops,   Lungs: Clear to auscultation bilaterally  Abd:  +BS, soft NT/ND,  No masses or organomegally  Extremities: Warm, No Edema, 2+ Pedal and radial pulses bilaterally  Skin: No lesions or rashes noted  Neuro/MSK: Able to ambulate around the exam room with equal movement, strength and normal coordination of the upper and lower  extremeties symmetrically    Results for orders placed or performed in visit on 08/17/20   Comprehensive Metabolic Panel   Result Value Ref Range    Sodium 142 136 - 145 mmol/L    Potassium 4.1 3.5 - 5.0 mmol/L    Chloride 107 98 - 107 mmol/L    CO2 25 22 - 31 mmol/L    Anion Gap, Calculation 10 5 - 18 mmol/L    Glucose 96 70 - 125 mg/dL    BUN 11 8 - 22 mg/dL    Creatinine 0.82 0.60 - 1.10 mg/dL    GFR MDRD Af Amer >60 >60 mL/min/1.73m2    GFR MDRD Non Af Amer >60 >60 mL/min/1.73m2    Bilirubin, Total 0.6 0.0 - 1.0 mg/dL    Calcium 8.6 8.5 - 10.5 mg/dL    Protein, Total 7.6 6.0 - 8.0 g/dL    Albumin 3.7 3.5 - 5.0 g/dL    Alkaline Phosphatase 71 45 - 120 U/L    AST 14 0 - 40 U/L    ALT 12 0 - 45 U/L   Thyroid Cascade   Result Value Ref Range    TSH 4.97 0.30 - 5.00 uIU/mL       Med list and active problem list reviewed and updated as part of this encounter    Current Outpatient Medications on File Prior to Visit   Medication Sig Dispense Refill     acetaminophen (TYLENOL) 500 MG tablet Take 500-1,000 mg by mouth as needed.        CALCIUM CARBONATE (TUMS ORAL) Take 2-4 tablets by mouth as needed.        cetirizine (ZYRTEC) 10 MG tablet Take 10 mg by mouth daily.       hydrOXYzine HCL (ATARAX) 25 MG tablet Take 25 mg by mouth.       ibuprofen (ADVIL,MOTRIN) 200 MG tablet Take 200 mg by mouth every 6 (six) hours as needed for pain.       No current facility-administered medications on file prior to visit.          Bibi Colunga MD

## 2021-06-11 NOTE — PROGRESS NOTES
"Progress Note - Initial Session    Client Name:  Anish Hauser Date: 9/14/2020     Service Type: Individual     Session Start Time: 1:30p  Session End Time: 2:15p     Session Length: 45 minutes     Session #: 1    Attendees: Patient attended alone    Telemedicine Visit: The patient's condition can be safely assessed and treated via synchronous audio and visual telemedicine encounter.      Reason for Telemedicine Visit: Services only offered telehealth    Originating Site (Patient Location): Patient's home    Distant Site (Provider Location): Provider remote setting - Home office    Consent:  The patient/guardian has verbally consented to: the potential risks and benefits of telemedicine (video visit) versus in person care; bill my insurance or make self-payment for services provided; and responsibility for payment of non-covered services.      Mode of Communication:  Video Conference via [employer]    As the provider I attest to compliance with applicable laws and regulations related to telemedicine.    The patient has been notified of the following:      \"We have found that certain health care needs can be provided without the need for a face to face visit. This service lets us provide the care you need with a phone conversation.       I will have full access to your [place] medical record during this entire phone call. I will be taking notes for your medical record.      Since this is like an office visit, we will bill your insurance company for this service.       There are potential benefits and risks of telephone visits (e.g. limits to patient confidentiality) that differ from in-person visits.?Confidentiality still applies for telephone services, and nobody will record the visit. It is important to be in a quiet, private space that is free of distractions (including cell phone or other devices) during the visit.??      If during the course of the call I believe a telephone visit is not appropriate, you will not " "be charged for this service.\"     Consent has been obtained for this service by care team member: Yes       DATA:  Diagnostic Assessment in progress.  Unable to complete documentation at the conclusion of the first session due to assess mental health needs.    Interactive Complexity: No  Crisis: No      Intervention:  Motivational Interviewing: evoke change talk and challenge sustain talk, assess readiness and confidence to change, point out discrepancies, explore ambivalence and road blocks to change; CBT: identify patterns of self defeating thoughts and behaviors, identify concerns for therapy, provide psychoeducation on the therapy process       ASSESSMENT:  Mental Status Assessment:  Appearance:   Appropriate   Eye Contact:   Good  Psychomotor Behavior: Normal   Attitude:   Cooperative   Orientation:   x3  Speech   Rate / Production: Normal/ Responsive   Volume:  Normal   Mood:    Anxious  Sad   Affect:    Appropriate  Tearful  Thought Content:  Clear   Thought Form:  Coherent  Goal Directed  Logical   Insight:    Good       Safety Issues and Plan for Safety and Risk Management:     Prentiss Suicide Severity Rating Scale (Lifetime/Recent)  Prentiss Suicide Severity Rating (Lifetime/Recent) 9/14/2020   1. Wish to be Dead (Lifetime) No   1. Wish to be Dead (Past Month) No   2. Non-Specific Active Suicidal Thoughts (Lifetime) No   2. Non-Specific Active Suicidal Thoughts (Past Month) No   3. Active Suicidal Ideation with any Methods (Not Plan) Without Intent to Act (Lifetime) No   3. Active Sucidal Ideation with any Methods (Not Plan) Without Intent to Act (Past Month) No   4. Active Suicidal Ideation with Some Intent to Act, Without Specific Plan (Lifetime) No   4. Active Suicidal Ideation with Some Intent to Act, Without Specific Plan (Past Month) No   5. Active Suicidal Ideation with Specific Plan and Intent (Lifetime) No   5. Active Suicidal Ideation with Specific Plan and Intent (Past Month) No   Actual " Attempt (Lifetime) No   Actual Attempt (Past 3 Months) No   Has subject engaged in non-suicidal self-injurious behavior? (Lifetime) No   Has subject engaged in non-suicidal self-injurious behavior? (Past 3 Months) No    Patient denies personal safety concerns.   Patient denies current or recent suicidal ideation or behaviors.  Patient denies current or recent homicidal ideation or behaviors.  Patient denies current or recent self injurious behavior or ideation.  Patient denies other safety concerns.  Recommended that patient call 911 or go to the local ED should there be a change in any of these risk factors.  Patient reports there are no firearms in the home.       Diagnostic Criteria:  A. Excessive anxiety and worry about a number of events or activities (such as work or school performance).   B. The person finds it difficult to control the worry.  C. Select 3 or more symptoms (required for diagnosis). Only one item is required in children.   - Restlessness or feeling keyed up or on edge.    - Irritability.    - Sleep disturbance (difficulty falling or staying asleep, or restless unsatisfying sleep).   D. The focus of the anxiety and worry is not confined to features of an Axis I disorder.  E. The anxiety, worry, or physical symptoms cause clinically significant distress or impairment in social, occupational, or other important areas of functioning.   F. The disturbance is not due to the direct physiological effects of a substance (e.g., a drug of abuse, a medication) or a general medical condition (e.g., hyperthyroidism) and does not occur exclusively during a Mood Disorder, a Psychotic Disorder, or a Pervasive Developmental Disorder.  Panic symptoms: rush or flush of nervousness through chest/body, chest pain, tingling sensations, increased heart rate    DSM5 Diagnoses: (Sustained by DSM5 Criteria Listed Above)  Diagnoses: 300.02 (F41.1) Generalized Anxiety Disorder w/Panic Attacks   Psychosocial & Contextual  Factors: Single parent, health concerns     Collateral Reports Completed:  Routed note to PCP      PLAN: (Homework, other):  Patient stated that they may follow up for ongoing services with [facility]. Complete intake/DA.      Gregg Smiley [facility]

## 2021-06-11 NOTE — TELEPHONE ENCOUNTER
Called and left message for patient to return call to clinic to schedule flu shot. Okay to schedule on call back.

## 2021-06-11 NOTE — PROGRESS NOTES
"Anish Hauser is a 40 y.o. female who is being evaluated via a billable video visit.      The patient has been notified of following:     \"This video visit will be conducted via a call between you and your physician/provider. We have found that certain health care needs can be provided without the need for an in-person physical exam.  This service lets us provide the care you need with a video conversation.  If a prescription is necessary we can send it directly to your pharmacy.  If lab work is needed we can place an order for that and you can then stop by our lab to have the test done at a later time.    Video visits are billed at different rates depending on your insurance coverage. Please reach out to your insurance provider with any questions.    If during the course of the call the physician/provider feels a video visit is not appropriate, you will not be charged for this service.\"    Patient has given verbal consent to a Video visit? Yes  How would you like to obtain your AVS? AVS Preference: MyChart.  If dropped by the video visit, the video invitation should be sent to: Text to cell phone: [phone]  Will anyone else be joining your video visit? No        Video Start Time: 12:51 PM    [facility] VIDEO Visit    Chief Complaint:  Chief Complaint   Patient presents with     Eye Problem     right eye is puffy-both more so the right     Concerns     [facility] told [facility] was higher- is this related to her eye or her medication     Medication Refill     taking metoprolol prn       Assessment/Plan:  1. Adjustment disorder with mixed anxiety and depressed mood  Refill as requested to help with sleep.    - hydrOXYzine HCL (ATARAX) 25 MG tablet; Take 1 tablet (25 mg total) by mouth at bedtime as needed for itching.  Dispense: 90 tablet; Refill: 4    2. Thyroid function test abnormal  Elevated TSH in ED setting last month but normal at [facility] since then.  Advised to recheck lab only in " 6 weeks again.    - Thyroid Stimulating Hormone (TSH); Future  - T4, Free; Future  - T3, Total; Future  - Thyroid Peroxidase Antibody; Future    Return in about 6 weeks (around 10/20/2020) for MA visit, Lab Only.    Patient Education/AVS:  There are no Patient Instructions on file for this visit.    HPI:   Anish Hauser is a 40 y.o. female c/o f/u from last visit.      Taking hydroxyzine every night and helping her sleep 25mg and wants a refill.      Has metoprolol xl 25mg do take as needed for palpitations and heart racing.  Has needed this 2-3 times.  bp monitor at home keeps reading high.  Read the instructions and she was using incorrectly.  bp is now reading better 116/76.      Eyes are really puffy lately.  Someone mentioned it to her.  More noticeable from the side.  Seems like the eyelid is puffy.  Wondering about thyroid or side effects of meds, etc?  Not painful.  Headaches at time more the right side.  visionis okay.      Anxiety- mostly atn ight.  Does have an appointment with [facility]    ROS:  Constitutional, ENT, CV, Resp, GI, , MSK, skin, neuro, psych all negative except as outlined in the HPI above and patient denies any other symptoms.      History summarized from1-2:cardiology consult- low risk for heart problems.  Calcium score ordered and negative.    Old Records-1: Outside allergies, meds, problems and immunizations were reconciled as needed  Radiology tests reviewed-1: ct angio with calcium score of 0.    Lab tests reviewed-1: 2020    Health Maintenance reviewed and ordered as appropriate as part of shared decision making with patient.     Social History     Tobacco Use   Smoking Status Former Smoker   Smokeless Tobacco Former User     Quit date: 8/12/2014   Tobacco Comment    No exposure to second hand smoking.      Social History     Substance and Sexual Activity   Sexual Activity Not Currently     Birth control/protection: None    Comment: Does not have a partner at the moment     Social History      Social History Narrative    ; 4 children - (23, 21, 17, 14)       Physical Exam:  Vitals from last visit reviewed.   Per pt report at home:      Patient's last menstrual period was 08/16/2020 (exact date).  Wt Readings from Last 3 Encounters:   08/27/20 201 lb (91.2 kg)   08/19/20 204 lb (92.5 kg)   08/17/20 203 lb (92.1 kg)     KALEE 7 Total Score: 6 (9/14/2020  1:00 PM)    PHQ-9 Total Score: 0 (9/14/2020  1:00 PM)    PHQ-2 Total Score: 0 (9/14/2020  1:00 PM)    No data recorded    All normal as below except abnormalities include: grossly normal exam today- no significant redness/swelling in eye lids as noticed or experienced by patient.  No proptosis seen.  No lid lag seen.  Good eye contact and engaged in healthy conversation and planning.  Forward thinking.  Normal affect noted.    GENERAL: Healthy, alert and no distress  EYES: Eyes grossly normal to inspection. No discharge or erythema, or obvious scleral/conjunctival abnormalities.  RESP: No audible wheeze, cough, or visible cyanosis.  No visible retractions or increased work of breathing.    NEURO: Cranial nerves grossly intact. Mentation and speech appropriate for age.  PSYCH: Mentation appears normal, affect normal/bright, judgement and insight intact, normal speech and appearance well-groomed    Results for orders placed or performed during the hospital encounter of 08/27/20   CTA Coronary W Calcium Score   Result Value Ref Range    HR 75 bpm    BSA 1.96 m2    Agatston Score of Left Main 0     Agatston Score of the Left Anterior Descending 0     Agatston Score of Circumflex 0     Agatston Score of Right Coronary Artery 0     Total Score 0.00        Med list and active problem list reviewed and updated as part of this encounter    Current Outpatient Medications on File Prior to Visit   Medication Sig Dispense Refill     acetaminophen (TYLENOL) 500 MG tablet Take 500-1,000 mg by mouth as needed.        CALCIUM CARBONATE (TUMS ORAL) Take 2-4  tablets by mouth as needed.        ibuprofen (ADVIL,MOTRIN) 200 MG tablet Take 200 mg by mouth every 6 (six) hours as needed for pain.       No current facility-administered medications on file prior to visit.        Video-Visit Details    Type of service:  Video Visit    Video End Time (time video stopped): 1:02 PM      Originating Location (pt. Location): Home    Distant Location (provider location):  [facility] FAMILY MEDICINE/OB     Mode of Communication:  Video Conference via Emiliano Colunga MD

## 2021-06-13 NOTE — TELEPHONE ENCOUNTER
Authorizing: Bibi Colunga MD in [facility] FAMILY MEDICINE/OB    Referral: [id number] (Pending Review)           Priority: Routine   Diagnosis: Hashimoto's thyroiditis     Is [facility] able to treat? Please review and advise.

## 2021-06-13 NOTE — PROGRESS NOTES
"I met with Anish Hauser at the request of Bibi Colunga MD   to recheck her blood pressure.  Blood pressure medications on the MAR were reviewed with patient.    Patient has taken all medications as per usual regimen: Yes  Patient reports tolerating them without any issues or concerns: Yes    Vitals:    12/14/20 1537   BP: 112/77   Patient Site: Right Arm   Patient Position: Sitting   Cuff Size: Adult Regular   Pulse: 76   Temp: 97.5  F (36.4  C)   TempSrc: Other   Weight: 188 lb (85.3 kg)   Height: 4' 11\" (1.499 m)       Blood pressure was taken, previous encounter was reviewed, recorded blood pressure below 140/90.  Patient was discharged and the note will be sent to the provider for final review.            "

## 2021-06-13 NOTE — TELEPHONE ENCOUNTER
"[place] calls and says that her heart is racing. Pt. Says that she also has left chest pain and anxiety. Pt. Says that she took her Metoprolol and her Hydroxyzine. Pt. Says that she will call 911. Yessi Pantoja RN FNA  Discharge Instructions for COVID-19 Patients  You have--or may have--COVID-19. Please follow the instructions listed below.   If you have a weakened immune system, discuss with your doctor any other actions you need to take.  How can I protect others?  If you have symptoms (fever, cough, body aches or trouble breathing):    Stay home and away from others (self-isolate) until:  ? At least 10 days have passed since your symptoms started, And   ? You've had no fever--and no medicine that reduces fever--for 1 full day (24 hours), And    ? Your other symptoms have resolved (gotten better).  If you don't show symptoms, but testing showed that you have COVID-19:    Stay home and away from others (self-isolate). Follow the tips under \"How do I self-isolate?\" below for 10 days (20 days if you have a weak immune system).    You don't need to be retested for COVID-19 before going back to school or work. As long as you're fever-free and feeling better, you can go back to school, work and other activities after waiting the 10 or 20 days.   How do I self-isolate?    Stay in your own room, even for meals. Use your own bathroom if you can.    Stay away from others in your home. No hugging, kissing or shaking hands. No visitors.    Don't go to work, school or anywhere else.    Clean \"high touch\" surfaces often (doorknobs, counters, handles). Use household cleaning spray or wipes. You'll find a full list of  on the EPA website: www.epa.gov/pesticide-registration/list-n-disinfectants-use-against-sars-cov-2.    Cover your mouth and nose with a mask or other face covering to avoid spreading germs.    Wash your hands and face often. Use soap and water.    Caregivers in these groups are at risk for severe illness due " to COVID-19:  ? People 65 years and older  ? People who live in a nursing home or long-term care facility  ? People with chronic disease (lung, heart, cancer, diabetes, kidney, liver, immunologic)  ? People who have a weakened immune system, including those who:    Are in cancer treatment    Take medicine that weakens the immune system, such as corticosteroids    Had a bone marrow or organ transplant    Have an immune deficiency    Have poorly controlled HIV or AIDS    Are obese (body mass index of 40 or higher)    Smoke regularly    Caregivers should wear gloves while washing dishes, handling laundry and cleaning bedrooms and bathrooms.    Use caution when washing and drying laundry: Don't shake dirty laundry and use the warmest water setting that you can.    For more tips on managing your health at home, go to www.cdc.gov/coronavirus/2019-ncov/downloads/10Things.pdf.  How can I take care of myself at home?  1. Get lots of rest. Drink extra fluids (unless a doctor has told you not to).    2. Take Tylenol (acetaminophen) for fever or pain. If you have liver or kidney problems, ask your family doctor if it's okay to take Tylenol.     Adults can take either:  ? 650 mg (two 325 mg pills) every 4 to 6 hours, or   ? 1,000 mg (two 500 mg pills) every 8 hours as needed.  ? Note: Don't take more than 3,000 mg in one day. Acetaminophen is found in many medicines (both prescribed and over-the-counter medicines). Read all labels to be sure you don't take too much.   For children, check the Tylenol bottle for the right dose. The dose is based on the child's age or weight.  3. If you have other health problems (like cancer, heart failure, an organ transplant or severe kidney disease): Call your specialty clinic if you don't feel better in the next 2 days.    4. Know when to call 911. Emergency warning signs include:  ? Trouble breathing or shortness of breath  ? Pain or pressure in the chest that doesn't go away  ? Feeling  confused like you haven't felt before, or not being able to wake up  ? Bluish-colored lips or face    5. Your doctor may have prescribed a blood thinner medicine. Follow their instructions.  Where can I get more information?    [employer] - About COVID-19: [email or web address]    CDC - What to Do If You're Sick: www.cdc.gov/coronavirus/2019-ncov/about/steps-when-sick.html    CDC - Ending Home Isolation: www.cdc.gov/coronavirus/2019-ncov/hcp/disposition-in-home-patients.html    CDC - Caring for Someone: www.cdc.gov/coronavirus/2019-ncov/if-you-are-sick/care-for-someone.html    [facility] - Interim Guidance for Hospital Discharge to Home: www.health.[facility].[place]/diseases/coronavirus/hcp/hospdischarge.pdf    [facility] clinical trials (COVID-19 research studies): clinicalaffairs.[facility]-clinical-trials    Below are the COVID-19 hotlines at the [place] Department of Health ([facility]). Interpreters are available.  ? For health questions: Call 203-731-2069 or 1-400.483.9649 (7 a.m. to 7 p.m.)  ? For questions about schools and childcare: Call 942-464-5458 or 1-380.537.3482 (7 a.m. to 7 p.m.)    For informational purposes only. Not to replace the advice of your health care provider. Clinically reviewed by the Infection Prevention Team. Copyright   2020 [facility]. All rights reserved. [employer] [id number] - REV 08/04/20.        Reason for Disposition    [1] Chest pain lasts > 5 minutes AND [2] described as crushing, pressure-like, or heavy    Additional Information    Negative: Passed out (i.e., lost consciousness, collapsed and was not responding)    Negative: Shock suspected (e.g., cold/pale/clammy skin, too weak to stand, low BP, rapid pulse)    Negative: Difficult to awaken or acting confused (e.g., disoriented, slurred speech)    Negative: Visible sweat on face or sweat dripping down face    Negative: Unable to walk, or can only walk with assistance (e.g., requires support)    Negative:  [1] Received SHOCK from implantable cardiac defibrillator AND [2] persisting symptoms (i.e., palpitations, lightheadedness)    Negative: Sounds like a life-threatening emergency to the triager    Chest pain    Negative: Severe difficulty breathing (e.g., struggling for each breath, speaks in single words)    Negative: Difficult to awaken or acting confused (e.g., disoriented, slurred speech)    Negative: Shock suspected (e.g., cold/pale/clammy skin, too weak to stand, low BP, rapid pulse)    Negative: [1] Chest pain lasts > 5 minutes AND [2] history of heart disease  (i.e., heart attack, bypass surgery, angina, angioplasty, CHF; not just a heart murmur)    Protocols used: CHEST PAIN-A-AH, HEART RATE AND HEARTBEAT [id number]

## 2021-06-13 NOTE — PROGRESS NOTES
"Anish Hauser is a 40 y.o. female who is being evaluated via a billable video visit.      The patient has been notified of following:     \"This video visit will be conducted via a call between you and your physician/provider. We have found that certain health care needs can be provided without the need for an in-person physical exam.  This service lets us provide the care you need with a video conversation.  If a prescription is necessary we can send it directly to your pharmacy.  If lab work is needed we can place an order for that and you can then stop by our lab to have the test done at a later time.    Video visits are billed at different rates depending on your insurance coverage. Please reach out to your insurance provider with any questions.    If during the course of the call the physician/provider feels a video visit is not appropriate, you will not be charged for this service.\"    Patient has given verbal consent to a Video visit? Yes  How would you like to obtain your AVS? AVS Preference: MyChart.  If dropped by the video visit, the video invitation should be sent to: Send to e-mail at: [email or web address]  Will anyone else be joining your video visit? No        Video Start Time: 1328    [facility] VIDEO Visit    Chief Complaint:  Chief Complaint   Patient presents with     Follow-up       Assessment/Plan:  1. Hashimoto's thyroiditis  Based on high TSH and TP Ab on previous testing.  Wondering about cycling between Grave's disease and hypothyroidism causing her palpitations and HTN.  Advised to restart her metoprolol xl 25mg daily and take on daily basis and not prn for now.  Recheck thyroid levels again this week now that she is having more hyperthyroid sx.  Check 24hr urine for pheochromocytosis given her symptoms.    - Catecholamine Fractionation, Free, 24 Hour Urine; Future  - Metanephrines Fractionated by HPLC-MS/MS, Urine; Future  - T4, Free; Future  - Thyroid Stimulating " Hormone (TSH); Future    2. Adjustment disorder with mixed anxiety and depressed mood  Likely affected by her palpitations/thyroid issues.  Continue cognitive therapy and check labs as above.  Okay to take hydroxyzine q6hrs as needed and also advised to take bblocker on daily basis.      Return in about 1 week (around 12/7/2020) for Lab Only, MA visit.    Patient Education/AVS:  There are no Patient Instructions on file for this visit.    HPI:   Anish Hauser is a 40 y.o. female c/o having questions about her thyroid issues and anxiety.       ED visit 8/11/2020 for anxiety found to have a high TSH.  Started on metoprolol to use prn palpitations.  Also started on hydroxyzine 25mg hs.  Did start cognitive tx.  bp remains healthy.  Eye lids are puffy and tight at times.      Pt notes that she isn't feeling well.  Did go back to ED at [facility] again 11/20/20 due to palpitations, anxiety and high bp as recommended by triage RN. Has been reading more on Hashimoto's and notes that some of her symptoms seem to be related to this.  Overall symptoms are getting worse with time.      Pt notes at night her HR will go up to 130 at times and will take the metoprolol as needed.  Has needed this a couple times in past month along with chest pain.  Has happened at least 5 times in past 10 days.  No headaches with these episodes- sharp sensation in temples that comes and goes quickly.  bp at home 160/120s.      ROS:  Constitutional, ENT, CV, Resp, GI, , MSK, skin, neuro, psych all negative except as outlined in the HPI above and patient denies any other symptoms.      ED record 11/20/20- trop negative.  Dx with anxiety.    Old Records-1: Outside allergies, meds, problems and immunizations were reconciled as needed  Radiology tests reviewed-1: Chest CT 8/27/20 negative  Lab tests reviewed-1:   8/2020 TSH 19.45 Free T4 0.7  8/17/20 TSH 4.97  10/21/20 TSH 3.8  Free T4 0.9, TP Ab 315.2    Health Maintenance reviewed and ordered as  appropriate as part of shared decision making with patient.     Social History     Tobacco Use   Smoking Status Former Smoker   Smokeless Tobacco Former User     Quit date: 8/12/2014   Tobacco Comment    No exposure to second hand smoking.      Social History     Substance and Sexual Activity   Sexual Activity Not Currently     Birth control/protection: None    Comment: Does not have a partner at the moment     Social History     Social History Narrative    ; 4 children - (23, 21, 17, 14)       Physical Exam:  Vitals from last visit reviewed.   Per pt report at home:      Patient's last menstrual period was 11/01/2020.  Wt Readings from Last 3 Encounters:   08/27/20 201 lb (91.2 kg)   08/19/20 204 lb (92.5 kg)   08/17/20 203 lb (92.1 kg)     KALEE 7 Total Score: 12 (11/30/2020 12:00 PM)    PHQ-9 Total Score: 11 (11/30/2020 12:00 PM)    PHQ-2 Total Score: 4 (11/30/2020 12:00 PM)    No data recorded    All normal as below except abnormalities include: pt appears well at home.    GENERAL: Healthy, alert and no distress  EYES: Eyes grossly normal to inspection. No discharge or erythema, or obvious scleral/conjunctival abnormalities.  RESP: No audible wheeze, cough, or visible cyanosis.  No visible retractions or increased work of breathing.    NEURO: Cranial nerves grossly intact. Mentation and speech appropriate for age.  PSYCH: Mentation appears normal, affect normal/bright, judgement and insight intact, normal speech and appearance well-groomed    Results for orders placed or performed in visit on 10/21/20   Thyroid Stimulating Hormone (TSH)   Result Value Ref Range    TSH 3.80 0.30 - 5.00 uIU/mL   T4, Free   Result Value Ref Range    Free T4 0.9 0.7 - 1.8 ng/dL   T3, Total   Result Value Ref Range    T3, Total 57 45 - 175 ng/dL   Thyroid Peroxidase Antibody   Result Value Ref Range    Thyroid Peroxidase Ab 315.2 (H) 0.0 - 5.6 IU/mL       Med list and active problem list reviewed and updated as part of this  encounter    Current Outpatient Medications on File Prior to Visit   Medication Sig Dispense Refill     acetaminophen (TYLENOL) 500 MG tablet Take 500-1,000 mg by mouth as needed.        CALCIUM CARBONATE (TUMS ORAL) Take 2-4 tablets by mouth as needed.        hydrOXYzine HCL (ATARAX) 25 MG tablet Take 1 tablet (25 mg total) by mouth at bedtime as needed for itching. 90 tablet 4     ibuprofen (ADVIL,MOTRIN) 200 MG tablet Take 200 mg by mouth every 6 (six) hours as needed for pain.       metoprolol succinate (TOPROL-XL) 25 MG        No current facility-administered medications on file prior to visit.        Video-Visit Details    Type of service:  Video Visit    Video End Time (time video stopped): 1345    Originating Location (pt. Location): Home    Distant Location (provider location):  [facility]/OB     Mode of Communication:  Video Conference via Savita/Emiliano Colunga MD

## 2021-06-14 NOTE — PROGRESS NOTES
[facility] IN-OFFICE Visit  Phone : none    Chief Complaint:  Chief Complaint   Patient presents with     follow up thyroid       Assessment/Plan:  1. Hashimoto's thyroiditis  Based on elevated TSH readings, symptoms and presence of thyroid peroxidase Ab over past 6 months.  Now with hypothyroidism that is symptomatic.  Started on 50mcg levothyroxine over past month.  Feeling better. Persistent thinning hair- pt requesting derm referral.  Has follow-up with endocrine for further recommendations.  Okay to transfer care back to PCP after endocrine consultation if appropriate.    - Ambulatory referral to Dermatology    2. Morbid obesity (H)  Work on LSM along with thyroid replacement in face of hypothyroidism as above.      3. Hair loss  - Ambulatory referral to Dermatology    4. Encounter for HCV screening test for low risk patient  To be drawn when she consults with endocrine as they will likely recheck thyroid levels at that poitn.    - Hepatitis C Antibody (Anti-HCV); Future      Return in about 6 months (around 7/18/2021) for Annual physical.  The following high BMI interventions were performed this visit: encouragement to exercise and lifestyle education regarding diet    Patient Education/AVS:  Patient Instructions   [facility]  PHONE: [phone]    [facility]  PHONE: [phone]            HPI:   Anish Hauser is a 40 y.o. female and presents to clinic today for the following health issues recheck on her underlying thyroid disorder.      Notes feeling a little better lately.  Thyroid pill seems to be helping- has been taking since end of December.  Has been working on dietary changes and regular exercise routine.  No heart problems in past 3 weeks.  Anxiety aslo better past 2 weeks and not needing any meds to use prn (hydroxyzine).      Still loosing a lot of hair and getting bald spots.  No skin changes other than itchy scalp.     Use to have hard  "stools but lately has been regular and softer.      Has noted some foods cause her to get more anxious and palpitations.  Following recommendations from google regarding Hashimoto's but this seems to be in conflict with underactive thyroid diet.  Finds high carb- pasta or asian noodles cause problems, grapes also seem to cause problems.  Avoiding high sugar foods and high carb foods.  Avoiding grains, dairy, gluten, etc.      Pt presented to ED 8/2020 with palpiations, etc.  Found to have elevated TSH 19.45 with Free t4 0.7.  Recheck a week later TSH 4.97  Recheck 10/21/20 3.8 but did have elevated thyroid peroxidase Ab.    12/2/20 TSH elevated at 7.67 with Free T4 1.0.  We decided to start treatment with levothyroixine 50mcg daily 12/21/20.  Pt had this visit scheduled.  She has follow-up with endocrine on 2/26/21.      Old Records-1: Outside allergies, meds, problems and immunizations were reconciled as needed from [facility]  Lab tests reviewed-1: as above      Social History     Social History Narrative    ; 4 children - (23, 21, 17, 14)       Physical Exam:  /60 (Patient Site: Left Arm, Patient Position: Sitting, Cuff Size: Adult Large)   Pulse 80   Ht 4' 11.75\" (1.518 m)   Wt 180 lb (81.6 kg)   LMP 01/14/2021   BMI 35.45 kg/m   Body mass index is 35.45 kg/m . Patient's last menstrual period was 01/14/2021.  Vital signs reviewed  Wt Readings from Last 3 Encounters:   01/18/21 180 lb (81.6 kg)   12/14/20 188 lb (85.3 kg)   08/27/20 201 lb (91.2 kg)     KALEE 7 Total Score: 12 (11/30/2020 12:00 PM)    PHQ-9 Total Score: 11 (11/30/2020 12:00 PM)    PHQ-2 Total Score: 4 (11/30/2020 12:00 PM)    No data recorded    All normal as below except abnormalities include: grossly normal.    General is a  40 y.o. female sitting comfortably in no apparent distress wearing a mask.  HEENT:  Eye exam normal   Neck: Supple without lymphadenopathy or thyromegaly  Extremities: Warm, No Edema, 2+ Pedal and " radial pulses bilaterally  Skin: No lesions or rashes noted  Neuro/MSK: Able to ambulate around the exam room with equal movement, strength and normal coordination of the upper and lower extremeties symmetrically    Bibi Colunga MD  [facility]

## 2021-06-15 NOTE — PROGRESS NOTES
"Anish Hauser is a 41 y.o. female who is being evaluated via a billable video visit.      How would you like to obtain your AVS? MyChart.  If dropped from the video visit, the video invitation should be resent by: Text to cell phone: [phone]  Will anyone else be joining your video visit? No      Video Start Time: 0730      Reason for visit      1. Hashimoto's thyroiditis        HPI     Anish Hauser is a very pleasant 41 y.o. old female who presents for follow up.  SUMMARY:    Anish is contacted today via Video Visit to establish care for Hashimoto's Disorder. She reports that she has been feeling \"miserable\" for about 2 years. She was found last October to have a TPA level of 315, indicating Hashimoto's.  She has a hx of anxiety and this perhaps was interfering with her diagnosis, she believes. She started treatment for the anxiety in November. By December, her TSH had risen out of range to 7.67, and she was started on Levothyroxine 50 mcg daily. She reports doing \"a lot of research\" online regarding a special diet for Thyroid disorder, as well as supplementation. She reports eliminating some foods from her diet. She also started exercising. She is feeling somewhat better on the Levothyroxine.     Past Medical History     Patient Active Problem List   Diagnosis     Morbid obesity (H)     Adjustment disorder with mixed anxiety and depressed mood     Hashimoto's thyroiditis       Family History       family history includes Diabetes in her mother; Hepatitis in her maternal grandmother; Hyperlipidemia in her mother; Hypertension in her maternal grandmother and mother; No Medical Problems in her brother, daughter, sister, and son.    Social History      reports that she has quit smoking. She quit smokeless tobacco use about 6 years ago. She reports that she does not drink alcohol or use drugs.      Review of Systems     Patient denies fatigue, weight changes, heat/cold intolerance, bowel/skin changes or CVS symptoms. "   Remainder per HPI and per attached intake form.      Vital Signs     There were no vitals taken for this visit.  Wt Readings from Last 3 Encounters:   01/18/21 180 lb (81.6 kg)   12/14/20 188 lb (85.3 kg)   08/27/20 201 lb (91.2 kg)       Physical Exam     General:  Normal, NIRD,appears euthyroid  Eyes:  Pupils equal, round and reactive to light; no proptosis, lid lag or  periorbital edema.  Neck: normal in appearance.  Lungs:  No respiratory distress, normal chest wall excursions  Abdomen: Soft, non-tender, no masses or organomegaly  Neuro: No tremors, alert and oriented x3  Skin:  No acanthosis nigricans or vitiligo          Assessment     1. Hashimoto's thyroiditis            Plan     Discussed [place] Thyroid as well as Cytomel today. Pt verbalized more confidence in understanding Hashimoto's Disorder. All of her questions were answered to her satisfaction. We will continue the 50 mcg of Levothyroxine at present, and get another set of labs to see if her dosage is where it needs to be. I will see her back in 6 months with another set of labs.         Kathy Brown   Endocrinology  2/17/2021  6:26 AM      Lab Results     TSH   Date Value Ref Range Status   12/02/2020 7.67 (H) 0.30 - 5.00 uIU/mL Final     T3, Total   Date Value Ref Range Status   10/21/2020 57 45 - 175 ng/dL Final     Thyroid Peroxidase Ab   Date Value Ref Range Status   10/21/2020 315.2 (H) 0.0 - 5.6 IU/mL Final     Thyroid Peroxidase Ab   Date Value Ref Range Status   10/21/2020 315.2 (H) 0.0 - 5.6 IU/mL Final       No results found for: T0BLFEN    Imaging Results   Last thyroid ultrasound:  No results found for this or any previous visit.    Last thyroid nuclear scan:  No results found for this or any previous visit.    Current Medications     Outpatient Medications Prior to Visit   Medication Sig Dispense Refill     acetaminophen (TYLENOL) 500 MG tablet Take 500-1,000 mg by mouth as needed.        CALCIUM CARBONATE (TUMS ORAL) Take 2-4  tablets by mouth as needed.        hydrOXYzine HCL (ATARAX) 25 MG tablet Take 1 tablet (25 mg total) by mouth at bedtime as needed for itching. 90 tablet 4     ibuprofen (ADVIL,MOTRIN) 200 MG tablet Take 200 mg by mouth every 6 (six) hours as needed for pain.       levothyroxine (SYNTHROID, LEVOTHROID) 50 MCG tablet Take 1 tablet (50 mcg total) by mouth daily. 90 tablet 3     metoprolol succinate (TOPROL-XL) 25 MG        No facility-administered medications prior to visit.          Video-Visit Details    Type of service:  Video Visit    Video End Time (time video stopped): 0815  Originating Location (pt. Location): Home    Distant Location (provider location):  [facility]     Platform used for Video Visit: Flexion    Date of last OV: Consult    Reason for Visit: Hashimoto's Thyroiditis    TSH: 12/02/20 - 7.67  T4, Free: 12/02/20 - 1.0  Thyroglobulin Tumor Maker: 10/01/20 - 315.2  Potassium: (CMP) 8/17/20 - 4.1  Creatinine: (CMP) 8/17/20 - 0.82  Vitamin D: 1/21/21 - 45.7  Ultrasound: None   Whole Body Scan: None  Ablative Therapy: None

## 2021-06-15 NOTE — PROGRESS NOTES
[facility] EXAM NOTE      Chief Complaint   Patient presents with     Urinary Tract Infection       ASSESSMENT & PLAN    Anish was seen today for urinary tract infection.    Diagnoses and all orders for this visit:    Urinary tract infection: UA positive for UTI with nitrites and large leukocyte esterase.  We will treat for acute uncomplicated UTI with Macrobid twice a day for 7 days.  We will follow up on urine culture and if need to switch antibiotic.  Follow-up if no improvement.  She has no signs or symptoms of pyelonephritis on exam today.  -     Urinalysis-UC if Indicated  -     Culture, Urine  -     nitrofurantoin, macrocrystal-monohydrate, (MACROBID) 100 MG capsule; Take 1 capsule (100 mg total) by mouth 2 (two) times a day for 7 days.      HISTORY    Anish Hauser is a 41 y.o.  female who presents for the following issues:    OTC test strip- + UTI  X 2 weeks. Pain dysuria,   retention  Frequency  OTC meds  Even when full bladder. Hurts to pee.   Just emptied baldder still have that urge.     Has been similar in the past without having a UTI  AZO  Always has discharge.   Doesn't feel like a yesat infeciton.  No fevers, chills, nausea vomiting.  Did have some nausea and heart racing and went to the emergency room on 3/7 this is it been an ongoing issue since she is had thyroiditis.    MEDICATIONS    Current Outpatient Medications on File Prior to Visit   Medication Sig Dispense Refill     hydrOXYzine HCL (ATARAX) 25 MG tablet Take 1 tablet (25 mg total) by mouth at bedtime as needed for itching. 90 tablet 4     levothyroxine (SYNTHROID, LEVOTHROID) 50 MCG tablet Take 1 tablet (50 mcg total) by mouth daily. 90 tablet 3     metoprolol succinate (TOPROL-XL) 25 MG        acetaminophen (TYLENOL) 500 MG tablet Take 500-1,000 mg by mouth as needed.        CALCIUM CARBONATE (TUMS ORAL) Take 2-4 tablets by mouth as needed.        ibuprofen (ADVIL,MOTRIN) 200 MG tablet Take 200 mg by mouth every 6 (six) hours as  needed for pain.       No current facility-administered medications on file prior to visit.        Pertinent past medical, surgical, social and family history reviewed and updated in [employer].      REVIEW OF SYSTEMS    ROS: 10 pt review of systems performed and all negative except noted in HPI.     PHYSICAL EXAM    /79 (Patient Site: Left Arm, Patient Position: Sitting, Cuff Size: Adult Large)   Pulse 73   Temp 98  F (36.7  C)   Resp 16   Wt 173 lb (78.5 kg)   LMP 02/24/2021   SpO2 99%   BMI 34.07 kg/m    GEN:  41 y.o. female sitting comfortably in no apparent distress.   HEENT: EOMI, no scleral icterus  CHEST/LUNG: No respiratory distress, good air flow to bases, CTAB   CV: RRR, S1, S2 normal; no murmurs, rubs or gallops. No edema.   Back: No CVA tenderness  ABD:  Soft, non-tender, non distended, no guarding,  No masses, no suprapubic tenderness     Recent Results (from the past 24 hour(s))   Urinalysis-UC if Indicated    Collection Time: 03/10/21 11:33 AM   Result Value Ref Range    Color, UA Yellow Colorless, Yellow, Straw, Light Yellow    Clarity, UA Slightly Cloudy (!) Clear    Glucose, UA Negative Negative    Bilirubin, UA Small (!) Negative    Ketones, UA 15 mg/dL (!) Negative    Specific Gravity, UA 1.020 1.005 - 1.030    Blood, UA Large (!) Negative    pH, UA 6.0 5.0 - 8.0    Protein,  mg/dL (!) Negative    Urobilinogen, UA 0.2 E.U./dL 0.2 E.U./dL, 1.0 E.U./dL    Nitrite, UA Positive (!) Negative    Leukocytes, UA Large (!) Negative     At the end of the encounter, I discussed results, diagnosis, medications. Discussed red flags for immediate return to clinic/ER, as well as indications for follow up if no improvement. Patient and/or caregiver understood and agreed to plan. Patient was stable for discharge.      Janessa Aguirre

## 2021-06-15 NOTE — PROGRESS NOTES
"ASSESSMENT/PLAN:  1. Head injury, closed, sequela      hit in head by a light fixture - at work; 1/2/18       38 yo female with recent head injury - at work - 1/2/18.  Was hit in the head by a long light fixture.  No LOC. Seen at [facility] ER initially.  Reviewed [facility] ER notes; no imaging done at time of incident.  No neurologic red flags .  Still having some modest pain/ pressure behind right eye (but getting better).  If pain persists or worsens, patient will give me a call and we'll scheduled CT scan of head.  Patient is comfortable with this.          There are no discontinued medications.  There are no Patient Instructions on file for this visit.    Chief Complaint:  Chief Complaint   Patient presents with     Follow-up     f/u hospital visit for work injury        HPI:   Anish Hauser is a 37 y.o. female c/o  Had a work injury, 1/2/18  Got to work, sat down at her desk  Light fixture above her desk fell on her  Hit patient on her head (she was standing at a stand-up desk)  No glass breaks - no laceration  Had a big bump on her head - went to ER ([facility])  No LOC - felt about 10 seconds of confusion -   Had 4x4 cm contusion per ER note    Now, has a \"faded\" feeling of numbness/pain in head that happened with the initial incident  Comes and goes throughout the day (since Thursday)  Feels pressure behind right eye - doesn't hurt, but bothers her - a little better last couple days  No numbness/tingling in legs/arms  Took some Tylenol initially - no further medications    Missed work x 2 days (Tuesday, Wednesday) - no more missed work        PMH:   Patient Active Problem List    Diagnosis Date Noted     Head injury, closed, sequela 01/16/2018     Past Medical History:   Diagnosis Date     Heartburn      Seasonal allergies      Past Surgical History:   Procedure Laterality Date     BREAST CYST INCISION AND DRAINAGE Left     sebacous cyst, Dr. Sidhu     Social History     Social History     Marital status: Single     " "Spouse name: N/A     Number of children: N/A     Years of education: N/A     Occupational History     Not on file.     Social History Main Topics     Smoking status: Former Smoker     Smokeless tobacco: Former User     Quit date: 8/12/2014      Comment: No exposure to second hand smoking.      Alcohol use No     Drug use: No     Sexual activity: Not Currently     Birth control/ protection: None      Comment: Does not have a partner at the moment     Other Topics Concern     Not on file     Social History Narrative       Meds:    Current Outpatient Prescriptions:      ACETAMINOPHEN ORAL, Take by mouth., Disp: , Rfl:      CALCIUM CARBONATE (TUMS ORAL), Take by mouth., Disp: , Rfl:      cetirizine (ZYRTEC) 10 MG tablet, Take 10 mg by mouth daily., Disp: , Rfl:      ibuprofen (ADVIL,MOTRIN) 200 MG tablet, Take 200 mg by mouth every 6 (six) hours as needed for pain., Disp: , Rfl:      omeprazole (PRILOSEC) 40 MG capsule, Take 1 capsule (40 mg total) by mouth daily., Disp: 30 capsule, Rfl: 2    Allergies:  No Known Allergies    ROS:  Pertinent positives as noted in HPI; otherwise 12 point ROS negative.      Physical Exam:  EXAM:  BP (!) 128/96 (Patient Site: Left Arm, Patient Position: Sitting, Cuff Size: Adult Large)  Pulse 80  Resp 16  Ht 4' 11.5\" (1.511 m)  Wt 221 lb 1 oz (100.3 kg)  SpO2 98%  Breastfeeding? No  BMI 43.9 kg/m2   Gen:  NAD, appears well, well-hydrated  HEENT:  TMs nl, oropharynx benign, nasal mucosa nl, conjunctiva clear, EOMI, PERRL, fundi benign  Neck:  Supple, no adenopathy, no thyromegaly, no carotid bruits, no JVD  Lungs:  Clear to auscultation bilaterally  Cor:  RRR no murmur  Abd:  Soft, nontender, BS+, no masses, no guarding or rebound, no HSM  Extr:  Neg.  Neuro:  No asymmetry, Nl motor tone/strength, nl sensation, reflexes =, gait nl, nl coordination, CN intact,   Skin:  Warm/dry        Results:  "

## 2021-06-20 NOTE — LETTER
Letter by Nancy Owens RN at      Author: Nancy Owens RN Service: -- Author Type: --    Filed:  Encounter Date: 6/10/2020 Status: (Other)       6/10/2020        Youa Yang [address] [place] [address]    This letter provides a written record that you were tested for COVID-19 on 6/8/20.     Your result was negative.    This means that we didnt find the virus that causes COVID-19 in your sample. A test may show negative when you do actually have the virus. This can happen when the virus is in the early stages of infection, before you feel illness symptoms.    Even if you dont have symptoms, they may still appear. For safety, its very important to follow these rules.    Keep yourself away from others (self-isolation):      Stay home. Dont go to work, school or anywhere else.     Stay in your own room (and use your own bathroom), if you can.    Stay away from others in your home. No hugging, kissing or shaking hands. No visitors.    Clean high touch surfaces often (doorknobs, counters, handles, etc.). Use a household cleaning spray or wipes.    Cover your mouth and nose with a mask, tissue or washcloth to avoid spreading germs.    Wash your hands and face often with soap and water.    Stay in self-isolation until you meet ALL of the guidelines below:    1. You have had no fever for at least 72 hours (that is 3 full days of no fever without the use of medicine that reduces fevers), AND  2. other symptoms (such as cough, shortness of breath) have gotten better, AND  3. at least 10 days have passed since your symptoms first appeared.    Going back to work  Check with your employer for any guidelines to follow for going back to work.    Employers: This document serves as formal notice that your employee tested negative for COVID-19, as of the testing date shown above.    For questions regarding this letter or your Negative COVID-19 result, call [phone] between 8A to 6:30P (M-F) and 10A to 6:30P  (weekends).

## 2021-06-20 NOTE — LETTER
Letter by Savanna Beal MD at      Author: Savanna Beal MD Service: -- Author Type: --    Filed:  Encounter Date: 3/2/2020 Status: (Other)         Anish Hauser  [address] [place] [address]             March 2, 2020         Dear Ms. Hauser,    Below are the results from your recent visit:    Resulted Orders   Comprehensive Metabolic Panel   Result Value Ref Range    Sodium 138 136 - 145 mmol/L    Potassium 4.1 3.5 - 5.0 mmol/L    Chloride 104 98 - 107 mmol/L    CO2 23 22 - 31 mmol/L    Anion Gap, Calculation 11 5 - 18 mmol/L    Glucose 89 70 - 125 mg/dL    BUN 13 8 - 22 mg/dL    Creatinine 0.76 0.60 - 1.10 mg/dL    GFR MDRD Af Amer >60 >60 mL/min/1.73m2    GFR MDRD Non Af Amer >60 >60 mL/min/1.73m2    Bilirubin, Total 0.4 0.0 - 1.0 mg/dL    Calcium 9.1 8.5 - 10.5 mg/dL    Protein, Total 7.2 6.0 - 8.0 g/dL    Albumin 3.4 (L) 3.5 - 5.0 g/dL    Alkaline Phosphatase 72 45 - 120 U/L    AST 12 0 - 40 U/L    ALT 11 0 - 45 U/L    Narrative    Fasting Glucose reference range is 70-99 mg/dL per  American Diabetes Association (ADA) guidelines.   Lipid Profile   Result Value Ref Range    Triglycerides 158 (H) <=149 mg/dL    Cholesterol 158 <=199 mg/dL    LDL Calculated 88 <=129 mg/dL    HDL Cholesterol 38 (L) >=50 mg/dL    Patient Fasting > 8hrs? Yes    Thyroid Stimulating Hormone (TSH)   Result Value Ref Range    TSH 1.49 0.30 - 5.00 uIU/mL   HM1 (CBC with Diff)   Result Value Ref Range    WBC 6.6 4.0 - 11.0 thou/uL    RBC 4.31 3.80 - 5.40 mill/uL    Hemoglobin 11.8 (L) 12.0 - 16.0 g/dL    Hematocrit 35.8 35.0 - 47.0 %    MCV 83 80 - 100 fL    MCH 27.3 27.0 - 34.0 pg    MCHC 32.9 32.0 - 36.0 g/dL    RDW 12.9 11.0 - 14.5 %    Platelets 268 140 - 440 thou/uL    MPV 8.3 7.0 - 10.0 fL    Neutrophils % 66 50 - 70 %    Lymphocytes % 26 20 - 40 %    Monocytes % 6 2 - 10 %    Eosinophils % 2 0 - 6 %    Basophils % 0 0 - 2 %    Neutrophils Absolute 4.4 2.0 - 7.7 thou/uL    Lymphocytes Absolute 1.7 0.8 -  4.4 thou/uL    Monocytes Absolute 0.4 0.0 - 0.9 thou/uL    Eosinophils Absolute 0.1 0.0 - 0.4 thou/uL    Basophils Absolute 0.0 0.0 - 0.2 thou/uL   HIV Antigen/Antibody Screening Cascade   Result Value Ref Range    HIV Antigen / Antibody Negative Negative    Narrative    Method is Abbott HIV Ag/Ab for the detection of HIV p24 antigen, HIV-1 antibodies and HIV-2 antibodies.   Gynecologic Cytology (PAP Smear)   Result Value Ref Range    Case Report       Gynecologic Cytology Report                       Case: [id number]                                   Authorizing Provider:  Lian,         Collected:           02/24/2020 1002                                     MD Savanna                                                                 Ordering Location:     [facility]  Received:            02/24/2020 1002                                     Medicine/OB                                                                  First Screen:          Vanessa Campbell, CT                                                                           (ASCP)                                                                       Specimen:    SUREPATH PAP, SCREENING, Endocervical/cervical                                             Interpretation  Negative for squamous intraepithelial lesion or malignancy.      Negative for squamous intraepithelial lesion or malignancy    Result Flag Normal Normal    Specimen Adequacy       Satisfactory for evaluation, endocervical/transformation zone component present    HPV Reflex? Yes regardless of result     HIGH RISK No     LMP/Menopause Date 2/20/2020     Abnormal Bleeding No     Pt Status na     Birth Control/Hormones None     Previous Normal/Date 2015     Prev Abn Date/Dx none known     Cervical Appearance inflammation    Wet Prep, Vaginal   Result Value Ref Range    Yeast Result No yeast seen No yeast seen    Trichomonas No Trichomonas seen No Trichomonas seen     Clue Cells, Wet Prep No Clue cells seen No Clue cells seen   HPV High Risk DNA Cervical   Result Value Ref Range    HPV Source SurePath     HPV16 DNA Negative NEG    HPV18 DNA Negative NEG    Other HR HPV Negative NEG    Final Diagnosis SEE NOTES       Comment:      This patient's sample is negative for HPV DNA.  This test was developed and its performance characteristics determined by the  [facility], [facility]. It  has not been cleared or approved by the FDA. The laboratory is regulated under  CLIA as qualified to perform high-complexity testing. This test is used for  clinical purposes. It should not be regarded as investigational or for  research.  (Note)  METHODOLOGY:  The Roche jose 4800 system uses automated extraction,  simultaneous amplification of HPV (L1 region) and beta-globin,  followed by  real time detection of fluorescent labeled HPV and beta  globin using specific oligonucleotide probes . The test specifically  identifies types HPV 16 DNA and HPV 18 DNA while concurrently  detecting the rest of the high risk types (31, 33, 35, 39, 45, 51,  52, 56, 58, 59, 66 or 68).    COMMENTS:  This test is not intended for use as a screening device  for women under age 30 with normal cervical   cytology.  Results should  be correlated with cytologic and histologic findings. Close clinical  followup is recommended.        Specimen Description Cervical Cells       Comment:        Performed and/or entered by:  [address] [address]        Your pap smear/HPV are normal.  No sign of infection.  Your labs are normal / reassuring.     Please call with questions or contact us using [employer].    Sincerely,        Electronically signed by Savanna Beal MD

## 2021-06-21 NOTE — LETTER
Letter by Bibi Colunga MD at      Author: Bibi Colunga MD Service: -- Author Type: --    Filed:  Encounter Date: 10/26/2020 Status: (Other)         Anish Hauser  [address] [place] [address]             October 26, 2020         Dear Ms. Hauser,    Below are the results from your recent visit:    Resulted Orders   Thyroid Stimulating Hormone (TSH)   Result Value Ref Range    TSH 3.80 0.30 - 5.00 uIU/mL   T4, Free   Result Value Ref Range    Free T4 0.9 0.7 - 1.8 ng/dL   T3, Total   Result Value Ref Range    T3, Total 57 45 - 175 ng/dL   Thyroid Peroxidase Antibody   Result Value Ref Range    Thyroid Peroxidase Ab 315.2 (H) 0.0 - 5.6 IU/mL       Your thyroid levels are healthy but you do have an antibody for your thyroid.  You technically have Hashimoto's thyroiditis.  This will cause your thyroid to fluctuate for a while and can make your body feel off and affect your mental health.  I'd like to check your levels again in 3 months, sooner if you have any changes.      Please call with questions or contact us using [employer]igo.    Sincerely,        Electronically signed by Bibi Colunga MD

## 2021-06-21 NOTE — LETTER
Letter by Bibi Colunga MD at      Author: Bibi Colunga MD Service: -- Author Type: --    Filed:  Encounter Date: 12/21/2020 Status: (Other)         Anish Hauser  [address] [place] [address]             December 21, 2020         Dear Ms. Hauser,    Below are the results from your recent visit:    Resulted Orders   Catecholamine Fractionation, Free, 24 Hour Urine   Result Value Ref Range    Hours Collected 24 hr    Total Volume 1550 mL    Creatinine, Urine - per volume 79 mg/dL    Creatinine, Urine - per 24h 1224 700 - 1600 mg/d      Comment:      Performed by [facility],  [address] [place] [address],[place] [address] [phone]  [email or web address], Angeline Washington MD, Lab. Director    Epinephrine, Urine - per 24h 3 1 - 14 ug/d      Comment:      REFERENCE INTERVAL: Epinephrine, Urine - ug/d    Access complete set of age- and/or gender-specific reference   intervals for this test in the [facility] Laboratory Test Directory   ([facility]).    Epinephrine, Urine - ratio to CRT 3 0 - 20 ug/g CRT    Epinephrine, Urine - per volume 2 ug/L    Norepinephrine, Urine - per 24h 33 14 - 120 ug/d      Comment:      REFERENCE INTERVAL: Norepinephrine, Urine - ug/d    Access complete set of age- and/or gender-specific reference   intervals for this test in the [facility] Laboratory Test Directory   ([email or web address]).    Norepinephrine, Urine - ratio to CRT 27 0 - 45 ug/g CRT    Norepinephrine, Urine - per volume 21 ug/L    Dopamine, Urine - per 24h 186 71 - 485 ug/d      Comment:      REFERENCE INTERVAL: Dopamine, Urine - ug/d    Access complete set of age- and/or gender-specific reference   intervals for this test in the [facility] Test Directory   ([email or web address]).    Dopamine, Urine - ratio to  0 - 250 ug/g CRT    Dopamine, Urine - per volume 120 ug/L    Catecholamines, Urine Interpretation See Note       Comment:      TEST INFORMATION: Catecholamines Fractionated, Urine Free    The optimal specimen for this testing is a  24-hour urine   collection. Mass per day calculations are not reported for   patients younger than 4 years of age and for the following   specimen types: a random collection, a collection with duration of   less than 20 hours, a collection with duration of greater than 28   hours, or a collection with total volume less than 400 mL (if 18   years of age or older) or greater than 5000 mL (all ages). Ratios   to creatinine may be useful for these evaluations.    Smaller increases in catecholamine concentrations (less than two   times the upper limit) usually are the result of physiological   stimuli, drugs, or improper specimen collection. Significant   elevation of one or more catecholamines (three or more times the   upper reference limit) is associated with an increased probability   of a neuroendocrine tumor.    Access complete set of age- and/or gender-specific reference    intervals for this test in the [facility] Test Directory   ([facility]).    Test developed and characteristics determined by [facility]. See Compliance Statement B: [facility]/   Metanephrines Fractionated by HPLC-MS/MS, Urine   Result Value Ref Range    Hours Collected 24 hr    Total Volume 1550 mL    Creatinine, Urine - per volume 79 mg/dL    Creatinine, Urine - per 24h 1224 700 - 1600 mg/d      Comment:      Performed by [facility],  [address] [place] [id number] [phone]  [email or web address], Angeline Washington MD, Lab. Director    Metanephrines, Urine Interpretation See Note       Comment:      TEST INFORMATION: Metanephrines Fractionated, Urine    The optimal specimen for this testing is a 24-hour urine   collection. Per-day calculations are not reported for patients   younger than 7 years of age and for the following specimen types:   a random collection, a collection with duration of less than 20   hours, a collection with duration of greater than 28 hours, or a   collection with total volume less than 400 mL (if  18 years of age   or older) or greater than 5000 mL (all ages). Ratios to creatinine   may be useful for these evaluations.    Smaller increases in metanephrine and/or normetanephrine   concentrations (less than two times the upper reference limit)   usually are the result of physiological stimuli, drugs, or   improper specimen collection. Essential hypertension is often   associated with slight elevations (metanephrine less than 400 ug/d   and normetanephrine less than 900 ug/d). Elevated concentrations   may be due to intense physical activity, life-threatening  illness,   and drug interferences.     Significant elevation of one or both metanephrines (three or more   times the upper reference limit) is associated with an increased   probability of a neuroendocrine tumor.    Access complete set of age- and/or gender-specific reference   intervals for this test in the [facility] Test Directory   ([email or web address]).    See Compliance statement B: [email or web address]    Metanephrine, Urine - per volume 58 ug/L    Normetanephrine, Urine - per volume 146 ug/L    Metanephrine, Urine - per 24h 90 36 - 229 ug/d    Metanephrine, Urine - ratio to CRT 73 0 - 300 ug/g CRT    Normetanephrine, Urine - per 24h 226 95 - 650 ug/d    Normetanephrine, Urine - ratio to  0 - 400 ug/g CRT       These tests all look okay.  The hormones in your urine are at healthy levels- not too high or low.      The thyroid antibody is not something we follow- if you have it we know that you have Grave's disease and can expect symptoms.      I would recommend that we start a low dose thyroid replacement 50mcg daily and set up a consultation with endocrine specialist next. I put orders in for both of these.     I would like to do a virtual visit with you 6-8 weeks after you start the thyroid pill.      Please call with questions or contact us using [employer].    Sincerely,        Electronically signed by Bibi Colunga MD

## 2021-06-21 NOTE — LETTER
Letter by Bibi Colunga MD at      Author: Bibi Colunga MD Service: -- Author Type: --    Filed:  Encounter Date: 12/6/2020 Status: (Other)         Anish Hauser  [address] [place] [address]             December 6, 2020         Dear Ms. Hauser,    Below are the results from your recent visit:    Resulted Orders   T4, Free   Result Value Ref Range    Free T4 1.0 0.7 - 1.8 ng/dL   Thyroid Stimulating Hormone (TSH)   Result Value Ref Range    TSH 7.67 (H) 0.30 - 5.00 uIU/mL       Your TSH is high and this means your thyroid is underactive right now.  We could either keep an eye on this or we could treat this with thyroid replacement and see if you start to feel better.  Let me know what you'd like to try next.      Please call with questions or contact us using [employer].    Sincerely,        Electronically signed by Bibi Colunga MD

## 2021-06-29 NOTE — PROGRESS NOTES
Progress Notes by Christiane Whalen MD at 8/19/2020 10:30 AM     Author: Christiane Whalen MD Service: -- Author Type: Physician    Filed: 8/19/2020 11:03 AM Encounter Date: 8/19/2020 Status: Signed    : Christiane Whalen MD (Physician)           Click to link to [facility] HEART [facility] NOTE    Thank you, Dr. Colunga, for asking me to see Anish Hauser in consultation at [facility] to evaluate chest pain.      Assessment/Plan:   1.  Intermittent chest pain: The patient developed intermittent chest pain more than a year.  Her chest pain is not very typical.  Her ECG showed normal sinus arrhythmia with nonspecific T wave abnormality.  Her ER evaluation was not impressive.  We discussed further evaluation.  The patient has a morbid obesity, not good candidate for exercise a stress test.  After discussion, coronary CT angiogram is requested.  If her coronary CT angiogram is normal, no need for further cardiac evaluation in the next 10 years.  The patient agreed with the plan.    2.  Morbid obesity: Lifestyle modification.  The patient will monitor her blood pressure, call me back if her blood pressure is elevated.    Thank you for the opportunity to be involved in the care of Anish Hauser. If you have any questions, please feel free to contact me.  I will see the patient again as needed    Much or all of the text in this note was generated through the use of Dragon Dictate voice-to-text software. Errors in spelling or words which seem out of context are unintentional.   Sound alike errors, in particular, may have escaped editing.       History of Present Illness:   It is my pleasure to see Anish Hauser at the [facility] Heart [facility] for evaluation of Consult. Anish Hauser is a 40 y.o. female with a medical history of morbid obesity and borderline dyslipidemia.    The patient developed intermittent chest pain for more than a year.  She described her chest pain as located left anterior chest, sharp  pain, lasted variable duration, no radiation, 4/10 intensity, not currently associated with exertion or shortness of breath.  She has occasional lightheadedness dizziness.  She has occasional palpitations.  She has no orthopnea, PND, shortness of breath, leg edema.  Her blood pressure has been labile recently, normal in clinic today.  Heart rate in normal range.    ER evaluation at [facility] on October 11, 2028 was not impressive.  Troponin was normal, TSH, CMP were normal.    Past Medical History:     Patient Active Problem List   Diagnosis   ? Morbid obesity (H)   ? Adjustment disorder with mixed anxiety and depressed mood   ? Elevated blood pressure reading without diagnosis of hypertension       Past Surgical History:     Past Surgical History:   Procedure Laterality Date   ? BREAST CYST INCISION AND DRAINAGE Left     sebacous cyst, Dr. Sidhu       Family History:     Family History   Problem Relation Age of Onset   ? Diabetes Mother    ? Hypertension Mother    ? Hyperlipidemia Mother    ? Hepatitis Maternal Grandmother    ? Hypertension Maternal Grandmother    ? No Medical Problems Sister    ? No Medical Problems Brother    ? No Medical Problems Daughter    ? No Medical Problems Son        Social History:    reports that she has quit smoking. She quit smokeless tobacco use about 6 years ago. She reports that she does not drink alcohol or use drugs.    Review of Systems:   General: WNL  Eyes: WNL  Ears/Nose/Throat: WNL  Lungs: WNL  Heart: Chest Pain, Irregular Heartbeat  Stomach: Constipation  Bladder: WNL  Muscle/Joints: WNL  Skin: WNL  Nervous System: Daytime Sleepiness, Dizziness  Mental Health: Confusion, Anxiety     Blood: WNL    Meds:     Current Outpatient Medications:   ?  acetaminophen (TYLENOL) 500 MG tablet, Take 500-1,000 mg by mouth as needed. , Disp: , Rfl:   ?  CALCIUM CARBONATE (TUMS ORAL), Take 2-4 tablets by mouth as needed. , Disp: , Rfl:   ?  hydrOXYzine HCL (ATARAX) 25 MG tablet, Take  "25 mg by mouth., Disp: , Rfl:   ?  metoprolol succinate (TOPROL-XL) 25 MG, Take 1 tablet (25 mg total) by mouth daily., Disp: 30 tablet, Rfl: 1  ?  cetirizine (ZYRTEC) 10 MG tablet, Take 10 mg by mouth daily., Disp: , Rfl:   ?  ibuprofen (ADVIL,MOTRIN) 200 MG tablet, Take 200 mg by mouth every 6 (six) hours as needed for pain., Disp: , Rfl:      Allergies:   House dust; Pollen extracts; and Shellfish containing products    Objective:      Physical Exam  204 lb (92.5 kg)  4' 11.5\" (1.511 m)  Body mass index is 40.51 kg/m .  /80 (Patient Site: Right Arm, Patient Position: Sitting, Cuff Size: Adult Large)   Pulse 74   Resp 16   Ht 4' 11.5\" (1.511 m)   Wt 204 lb (92.5 kg)   LMP 08/16/2020 (Exact Date)   SpO2 98%   BMI 40.51 kg/m      General Appearance:   Awake, Alert, No acute distress.   HEENT:  Pupil equal, reactive to light. No scleral icterus; the mucous membranes were moist. No oral ulcers or thrush.    Neck: No cervical bruits. No JVD. No thyromegaly. No lymph node enlargement or tenderness.   Chest: The spine was straight. The chest was symmetric.   Lungs:   Respirations unlabored. Lungs are clear to auscultation. No crackles. No wheezing.   Cardiovascular:   RRR, normal first and second heart sounds with no murmurs. No rubs or gallops.    Abdomen:  Obese. Soft. No tenderness. Non-distended. Bowels sounds are present   Extremities: Equal posterior tibial pulses. No leg edema.   Skin: No rashes or ulcers. Warm, Dry.   Musculoskeletal: No tenderness. No deformity.   Neurologic: Mood and affect are appropriate. No focal deficits.         EKG:  Personally reivewed  Normal sinus rhythm  Nonspecific T wave abnormality  No abnormal ECG  Compared to previous ECG  No significant change      Lab Review   Lab Results   Component Value Date     08/17/2020    K 4.1 08/17/2020     08/17/2020    CO2 25 08/17/2020    BUN 11 08/17/2020    CREATININE 0.82 08/17/2020    CALCIUM 8.6 08/17/2020     Lab " Results   Component Value Date    WBC 6.6 02/24/2020    HGB 11.8 (L) 02/24/2020    HCT 35.8 02/24/2020    MCV 83 02/24/2020     02/24/2020     Lab Results   Component Value Date    CHOL 158 02/24/2020    TRIG 158 (H) 02/24/2020    HDL 38 (L) 02/24/2020     Lab Results   Component Value Date    TSH 4.97 08/17/2020

## 2021-07-08 ENCOUNTER — COMMUNICATION - HEALTHEAST (OUTPATIENT)
Dept: ADMINISTRATIVE | Facility: CLINIC | Age: 41
End: 2021-07-08

## 2021-07-08 NOTE — TELEPHONE ENCOUNTER
Telephone Encounter by Radha Carias, RN at 7/8/2021  4:48 PM     Author: Radha Carias RN Service: -- Author Type: Registered Nurse    Filed: 7/8/2021  4:50 PM Encounter Date: 7/8/2021 Status: Signed    : Radha Carias, RN (Registered Nurse)       I called the pt, she states that she noticed the bump this week, it is in the front of her neck, hurts when touched, no issues swallowing, and does not itch. I informed that I will speak to RA Robbins and contact her back.

## 2021-07-08 NOTE — TELEPHONE ENCOUNTER
Telephone Encounter by Shayla Smiley at 7/8/2021  3:32 PM     Author: Shayla Smiley Service: -- Author Type: Patient Access    Filed: 7/8/2021  3:41 PM Encounter Date: 7/8/2021 Status: Signed    : Shayla Smiley (Patient Access)       Patient called. She is concern about a bump/lump that just recently appeared on her neck. It looks like a bug bite and it does not itch, but it hurts when you touch it.     She would like an appointment with [facility], preferably in the office.      Please advise on what to do.     Anish @ [phone]

## 2021-07-09 NOTE — TELEPHONE ENCOUNTER
Telephone Encounter by Kathy Brown NP at 7/9/2021  5:55 AM     Author: Kathy Brown NP Service: -- Author Type: Nurse Practitioner    Filed: 7/9/2021  5:55 AM Encounter Date: 7/8/2021 Status: Signed    : Kathy Brown NP (Nurse Practitioner)       I doubt it has anything to do with her Thyroid - I would strongly suggest an appointment with her PCP or go to [facility] and have it worked up.

## 2021-07-09 NOTE — TELEPHONE ENCOUNTER
Telephone Encounter by Radha Carias RN at 7/9/2021  9:10 AM     Author: Radha Carias RN Service: -- Author Type: Registered Nurse    Filed: 7/9/2021  9:11 AM Encounter Date: 7/8/2021 Status: Signed    : Radha Carias, RN (Registered Nurse)       I called the pt and informed of the below. Pt understands and will make an appt with her PCP.

## 2021-08-06 DIAGNOSIS — E06.3 HASHIMOTO'S THYROIDITIS: Primary | ICD-10-CM

## 2021-08-06 NOTE — PROGRESS NOTES
Anish is a 41 year old who is being evaluated via a billable video visit.      How would you like to obtain your AVS? MyChart  If the video visit is dropped, the invitation should be resent by: Text to cell phone: [phone]  Will anyone else be joining your video visit? No      Video Start Time: 1020    [facility] ENDOCRINOLOGY    Thyroid Note  8/17/2021    Anish Hauser, 1980, [id number]          Reason for visit      1. Hashimoto's thyroiditis        HPI     Anish Hauser is a very pleasant 41 year old old female who presents for follow up.  SUMMARY:    Anish is contacted today via Video Visit in f/u for Hashimoto's disorder. She reports that she is feeling well. She does have occasional Anxiety attacks, which will cause her heart to race. She does have Hydroxyzine to take when this happens, and reports that it helps.  She is currently taking Levothyroxine 50 mcg daily on an empty stomach. Current TSH is 2.04 and FT4 is 1.13. She is having no problems referable to her neck.     Past Medical History     Patient Active Problem List   Diagnosis     Health care maintenance     Adjustment disorder with mixed anxiety and depressed mood     Hashimoto's thyroiditis     Morbid obesity (H)       Family History       family history includes Diabetes in her mother; Hepatitis in her maternal grandmother; Hyperlipidemia in her mother; Hypertension in her maternal grandmother and mother; No Known Problems in her brother, daughter, sister, and son.    Social History      reports that she has quit smoking. She quit smokeless tobacco use about 7 years ago. She reports that she does not drink alcohol and does not use drugs.      Review of Systems     Patient denies fatigue, weight changes, heat/cold intolerance, bowel/skin changes or CVS symptoms.   Remainder per HPI and per attached intake form.      Vital Signs     There were no vitals taken for this visit.  Wt Readings from Last 3 Encounters:   03/10/21 78.5 kg (173 lb)    01/18/21 81.6 kg (180 lb)   12/14/20 85.3 kg (188 lb)       Physical Exam     Constitutional:  Well developed, Well nourished  HENT:  Normocephalic,   Neck: normal in appearance  Eyes:  PERRL, Conjunctiva pink  Respiratory:  No respiratory distress  Skin: No acanthosis nigricans, lipoatrophy or lipodystrophy  Neurologic:  Alert & oriented x 3, nonfocal  Psychiatric:  Affect, Mood, Insight appropriate          Assessment     1. Hashimoto's thyroiditis            Plan     Pt is bio-chemically and physically euthyroid. She will remain on her current dose of Levothyroxine. F/u with me in 1 year, sooner with problems or concerns.         Kathy Brown NP  [facility]  8/17/2021  10:26 AM        Lab Results     TSH   Date Value Ref Range Status   08/10/2021 2.04 0.30 - 5.00 uIU/mL Final     No components found for: THYROIDAB    No results found for: E6IIPVZ    Imaging Results   Last thyroid ultrasound:  No results found for this or any previous visit.      Last thyroid nuclear scan:  No results found for this or any previous visit.      Current Medications     Outpatient Medications Prior to Visit   Medication Sig Dispense Refill     hydrOXYzine (ATARAX) 25 MG tablet Take 25 mg by mouth       metoprolol succinate ER (TOPROL-XL) 25 MG 24 hr tablet Takes prn for racing heart/anxiety       EPINEPHrine (EPIPEN) 0.3 MG/0.3ML injection Inject 0.3 mLs (0.3 mg) into the muscle once as needed for anaphylaxis (Patient not taking: Reported on 8/17/2021) 1 each 1     diphenhydrAMINE (BENADRYL) 25 MG capsule Take 25 mg by mouth every 6 hours as needed       IBUPROFEN        levothyroxine (SYNTHROID/LEVOTHROID) 50 MCG tablet Take 50 mcg by mouth       No facility-administered medications prior to visit.         Video-Visit Details    Type of service:  Video Visit    Video End Time: 1040    Originating Location (pt. Location): Home    Distant Location (provider location):  [facility]     Platform used for  Video Visit: Doximity    Date of last OV: 2/16/21  Reason for Visit: Hashimoto's Thyroiditis

## 2021-08-10 ENCOUNTER — LAB (OUTPATIENT)
Dept: LAB | Facility: CLINIC | Age: 41
End: 2021-08-10
Payer: COMMERCIAL

## 2021-08-10 DIAGNOSIS — E06.3 HASHIMOTO'S THYROIDITIS: ICD-10-CM

## 2021-08-10 LAB
T4 FREE SERPL-MCNC: 1.13 NG/DL (ref 0.7–1.8)
TSH SERPL DL<=0.005 MIU/L-ACNC: 2.04 UIU/ML (ref 0.3–5)

## 2021-08-10 PROCEDURE — 84443 ASSAY THYROID STIM HORMONE: CPT

## 2021-08-10 PROCEDURE — 36415 COLL VENOUS BLD VENIPUNCTURE: CPT

## 2021-08-10 PROCEDURE — 84439 ASSAY OF FREE THYROXINE: CPT

## 2021-08-17 ENCOUNTER — VIRTUAL VISIT (OUTPATIENT)
Dept: ENDOCRINOLOGY | Facility: CLINIC | Age: 41
End: 2021-08-17
Payer: COMMERCIAL

## 2021-08-17 DIAGNOSIS — E06.3 HASHIMOTO'S THYROIDITIS: Primary | ICD-10-CM

## 2021-08-17 PROBLEM — E66.01 MORBID OBESITY (H): Status: ACTIVE | Noted: 2020-08-17

## 2021-08-17 PROBLEM — F43.23 ADJUSTMENT DISORDER WITH MIXED ANXIETY AND DEPRESSED MOOD: Status: ACTIVE | Noted: 2020-08-17

## 2021-08-17 PROCEDURE — 99214 OFFICE O/P EST MOD 30 MIN: CPT | Mod: 95 | Performed by: NURSE PRACTITIONER

## 2021-08-17 RX ORDER — HYDROXYZINE HYDROCHLORIDE 25 MG/1
25 TABLET, FILM COATED ORAL
COMMUNITY
Start: 2020-09-08 | End: 2022-01-24

## 2021-08-17 RX ORDER — METOPROLOL SUCCINATE 25 MG/1
TABLET, EXTENDED RELEASE ORAL
COMMUNITY
Start: 2020-09-14 | End: 2022-01-25

## 2021-08-17 RX ORDER — LEVOTHYROXINE SODIUM 50 UG/1
50 TABLET ORAL DAILY
Qty: 90 TABLET | Refills: 3 | Status: SHIPPED | OUTPATIENT
Start: 2021-08-17 | End: 2022-02-15

## 2021-08-17 RX ORDER — LEVOTHYROXINE SODIUM 50 UG/1
50 TABLET ORAL
COMMUNITY
Start: 2020-12-21 | End: 2021-08-17

## 2021-09-09 ENCOUNTER — OFFICE VISIT (OUTPATIENT)
Dept: FAMILY MEDICINE | Facility: CLINIC | Age: 41
End: 2021-09-09
Payer: COMMERCIAL

## 2021-09-09 VITALS
SYSTOLIC BLOOD PRESSURE: 112 MMHG | DIASTOLIC BLOOD PRESSURE: 81 MMHG | HEART RATE: 75 BPM | TEMPERATURE: 98.6 F | BODY MASS INDEX: 36.63 KG/M2 | WEIGHT: 186 LBS | OXYGEN SATURATION: 96 %

## 2021-09-09 DIAGNOSIS — L30.9 DERMATITIS: Primary | ICD-10-CM

## 2021-09-09 PROCEDURE — 99213 OFFICE O/P EST LOW 20 MIN: CPT | Performed by: FAMILY MEDICINE

## 2021-09-09 RX ORDER — BENZOCAINE/MENTHOL 6 MG-10 MG
LOZENGE MUCOUS MEMBRANE 2 TIMES DAILY
Qty: 30 G | Refills: 0 | Status: SHIPPED | OUTPATIENT
Start: 2021-09-09 | End: 2022-08-17

## 2021-09-09 ASSESSMENT — PATIENT HEALTH QUESTIONNAIRE - PHQ9: SUM OF ALL RESPONSES TO PHQ QUESTIONS 1-9: 0

## 2021-09-09 NOTE — PROGRESS NOTES
Assessment/ Plan  1. Dermatitis  Behind the ears.  Etiology is unclear but possibly related to reaction to facemask straps.  Recommend going to mask with alternative straps.  Hydrocortisone, follow-up if not improving.      - hydrocortisone (CORTAID) 1 % external cream; Apply topically 2 times daily Apply to rash bid x 14 days  Dispense: 30 g; Refill: 0  Patient was offered and accepted flu shot today but I believe she left before she had it administered.  Body mass index is 36.63 kg/m .    Subjective  CC:  chief complaint  HPI:  Patient is a 41-year-old.  Complains of discharge, mostly from the center front side and sometimes from the backside of her ear piercings.  Ears are somewhat itchy.  She had her ears pierced as a baby and wears earrings maybe once a year.  Has not done it recently.  This problems been going on for a few weeks.  Also had some decreased hearing in her right ear for a time but this got better.  No recent URI.  Patient Active Problem List   Diagnosis     Health care maintenance     Adjustment disorder with mixed anxiety and depressed mood     Hashimoto's thyroiditis     Morbid obesity (H)     Current medications reviewed as follows:  hydrOXYzine (ATARAX) 25 MG tablet, Take 25 mg by mouth  levothyroxine (SYNTHROID/LEVOTHROID) 50 MCG tablet, Take 1 tablet (50 mcg) by mouth daily  metoprolol succinate ER (TOPROL-XL) 25 MG 24 hr tablet, Takes prn for racing heart/anxiety  EPINEPHrine (EPIPEN) 0.3 MG/0.3ML injection, Inject 0.3 mLs (0.3 mg) into the muscle once as needed for anaphylaxis (Patient not taking: Reported on 8/17/2021)    No current facility-administered medications on file prior to visit.     History   Smoking Status     Former Smoker   Smokeless Tobacco     Former User     Quit date: 8/12/2014     Comment: No exposure to second hand smoking.     Social History     Social History Narrative    ; 4 children - (23, 21, 17, 14)       Patient Care Team:  Bibi Colunga MD as PCP -  General (Family Practice)  Bibi Colunga MD as Assigned PCP  Christiane Whalen MD as Assigned Heart and Vascular Provider  ROS  As above      Objective  Physical Exam  Vitals:    09/09/21 1623   BP: 112/81   BP Location: Right arm   Patient Position: Sitting   Cuff Size: Adult Regular   Pulse: 75   Temp: 98.6  F (37  C)   TempSrc: Temporal   SpO2: 96%   Weight: 84.4 kg (186 lb)     Seen from the front, ear piercings and earlobes appear perfectly normal.  She has eczematous change on the backside and wrapping around her border of her ear and temporal region.  No induration or nodules under the skin.  Diagnostics  None    Please note: Voice recognition software was used in this dictation.  It may therefore contain typographical errors.

## 2021-09-25 ENCOUNTER — HEALTH MAINTENANCE LETTER (OUTPATIENT)
Age: 41
End: 2021-09-25

## 2021-10-14 ENCOUNTER — OFFICE VISIT (OUTPATIENT)
Dept: FAMILY MEDICINE | Facility: CLINIC | Age: 41
End: 2021-10-14
Payer: COMMERCIAL

## 2021-10-14 VITALS
HEART RATE: 81 BPM | SYSTOLIC BLOOD PRESSURE: 118 MMHG | WEIGHT: 192 LBS | DIASTOLIC BLOOD PRESSURE: 84 MMHG | HEIGHT: 59 IN | TEMPERATURE: 97.2 F | BODY MASS INDEX: 38.71 KG/M2 | RESPIRATION RATE: 12 BRPM

## 2021-10-14 DIAGNOSIS — Z00.00 ROUTINE GENERAL MEDICAL EXAMINATION AT A HEALTH CARE FACILITY: Primary | ICD-10-CM

## 2021-10-14 DIAGNOSIS — E06.3 HYPOTHYROIDISM DUE TO HASHIMOTO'S THYROIDITIS: ICD-10-CM

## 2021-10-14 DIAGNOSIS — L73.2 HIDRADENITIS: ICD-10-CM

## 2021-10-14 LAB
FASTING STATUS PATIENT QL REPORTED: NORMAL
GLUCOSE BLD-MCNC: 96 MG/DL (ref 70–125)
HBA1C MFR BLD: 5.8 % (ref 0–5.6)

## 2021-10-14 PROCEDURE — 99396 PREV VISIT EST AGE 40-64: CPT | Mod: 25 | Performed by: FAMILY MEDICINE

## 2021-10-14 PROCEDURE — 90471 IMMUNIZATION ADMIN: CPT | Performed by: FAMILY MEDICINE

## 2021-10-14 PROCEDURE — 86803 HEPATITIS C AB TEST: CPT | Performed by: FAMILY MEDICINE

## 2021-10-14 PROCEDURE — 82947 ASSAY GLUCOSE BLOOD QUANT: CPT | Performed by: FAMILY MEDICINE

## 2021-10-14 PROCEDURE — 99213 OFFICE O/P EST LOW 20 MIN: CPT | Mod: 25 | Performed by: FAMILY MEDICINE

## 2021-10-14 PROCEDURE — 36415 COLL VENOUS BLD VENIPUNCTURE: CPT | Performed by: FAMILY MEDICINE

## 2021-10-14 PROCEDURE — 83036 HEMOGLOBIN GLYCOSYLATED A1C: CPT | Performed by: FAMILY MEDICINE

## 2021-10-14 PROCEDURE — 90686 IIV4 VACC NO PRSV 0.5 ML IM: CPT | Performed by: FAMILY MEDICINE

## 2021-10-14 RX ORDER — CLINDAMYCIN PHOSPHATE 10 UG/ML
LOTION TOPICAL 2 TIMES DAILY
Qty: 60 ML | Refills: 11 | Status: SHIPPED | OUTPATIENT
Start: 2021-10-14 | End: 2023-08-29

## 2021-10-14 RX ORDER — DOXYCYCLINE 100 MG/1
100 CAPSULE ORAL 2 TIMES DAILY
Qty: 28 CAPSULE | Refills: 0 | Status: SHIPPED | OUTPATIENT
Start: 2021-10-14 | End: 2021-10-28

## 2021-10-14 ASSESSMENT — MIFFLIN-ST. JEOR: SCORE: 1446.54

## 2021-10-14 NOTE — PROGRESS NOTES
"[facility] Health Maintenance Exam  [facility] (formerly [facility]      Assessment/Plan     1. Health Maintenance female exam.   Health maintenance discussed as appropriate for age and risk factors including:  Nutrition, exercise, alcohol use, tobacco use, safe sexual practices, dental health.  Cancer screening assessed and ordered as indicated. Routine labs as outlined below based on age and risk factors reviewed today. Immunizations reviewed and updated.       Routine general medical examination at a health care facility  - Hemoglobin A1c  - Glucose  - Hepatitis C antibody  - Hemoglobin A1c  - Glucose  - Hepatitis C antibody    Hypothyroidism due to Hashimoto's thyroiditis  Continue replacement with levothyroxine 50mcg daily.      Hidradenitis  Will treat her active lesions today with PO doxycycline bid for 14 days and then continue topical antibiotics in affected area daily after washing with antibacterial soap daily.  Follow-up with derm next.    - clindamycin (CLEOCIN T) 1 % external lotion  Dispense: 60 mL; Refill: 11  - doxycycline hyclate (VIBRAMYCIN) 100 MG capsule  Dispense: 28 capsule; Refill: 0       Return in about 6 months (around 4/14/2022).    Patient has been advised of split billing requirements and indicates understanding: Yes    Patient Education/AVS:  There are no Patient Instructions on file for this visit.    HPI:      Chief Complaint   Patient presents with     Physical       Anish Hauser is a 41 year old female and is here for a comprehensive health maintenance exam.     Other concerns today:   Hashimoto's- follow-up with endocrine 8/2021 euthyroid on current dose and recommended follow-up in 1 year unless sx develop.  Still getting palpitations, etc.      Worried about diabetes.  Mom is diabetic.  Notes frequent urination.  Body seems to \"crash\" at times.  Gets hungry easily even after eating.      Worried about getting high bp- has wrist cuff at home and running high.      Stubborn cysts that come " and goes. Has one on left arm pit for past couple of months, getting irritated and may be infected.  Want to know what to do to help get rid of them.      Whenever she gets anxiety she will take the meds prescribed as needed.  3-4x/week at the most, waking with weird feeling and anxiety.      Periods are more regular now.      Notes that she gets severe abdominal pain RUQ under ribs. Really painful and has to lay down to rest.  Started after her thyroid pills started.      Healthy Habits: Body mass index is 38.78 kg/m .  Healthy Habits:     Getting at least 3 servings of Calcium per day:  Yes    Bi-annual eye exam:  Yes    Dental care twice a year:  Yes    Sleep apnea or symptoms of sleep apnea:  Sleep apnea    Diet:  Regular (no restrictions)    Frequency of exercise:  2-3 days/week    Duration of exercise:  30-45 minutes    Taking medications regularly:  Yes    Medication side effects:  None    PHQ-2 Total Score: 0    Additional concerns today:  Yes    Smoking:   History   Smoking Status     Former Smoker   Smokeless Tobacco     Former User     Quit date: 8/12/2014     Comment: No exposure to second hand smoking.     Pregnancy planning: no   History   Sexual Activity     Sexual activity: Not Currently     Birth control/ protection: None     Comment: Does not have a partner at the moment     Last Dexa:na    Cancer Screening:  Hemoccults/Colonoscopy: na  Mammogram:  na  Pap Smear:  Normal 2020- repeat 2025  Lung Cancer: na    Counseling Resources:  ATP IV Guidelines  Pooled Cohorts Equation Calculator  Breast Cancer Risk Calculator  BRCA-Related Cancer Risk Assessment: FHS-7 Tool  FRAX Risk Assessment  ICSI Preventive Guidelines  Dietary Guidelines for Americans, 2010  [employer]'s MyPlate  ASA Prophylaxis  Lung CA Screening    Immunization History   Administered Date(s) Administered     COVID-19,PF,Pfizer (12+ Yrs) 04/30/2021, 05/21/2021     FLU 6-35 months 01/25/2018     Flu, Unspecified 08/27/2012     Influenza (IIV3)  PF 2012     Influenza Vaccine IM > 6 months Valent IIV4 (Alfuria,Fluzone) 2020, 10/21/2020, 10/14/2021     Tdap (Adacel,Boostrix) 2012, 2014       OB History    Para Term  AB Living   4 0 0 0 0 0   SAB IAB Ectopic Multiple Live Births   0 0 0 0 0      # Outcome Date GA Lbr Deandre/2nd Weight Sex Delivery Anes PTL Lv   4             3             2             1                 Meds  Current Outpatient Medications   Medication     clindamycin (CLEOCIN T) 1 % external lotion     hydrocortisone (CORTAID) 1 % external cream     hydrOXYzine (ATARAX) 25 MG tablet     levothyroxine (SYNTHROID/LEVOTHROID) 50 MCG tablet     metoprolol succinate ER (TOPROL-XL) 25 MG 24 hr tablet     No current facility-administered medications for this visit.       Past Medical History  Past Medical History:   Diagnosis Date     Head injury, closed, sequela 2018     Heartburn      Seasonal allergies        Surgical History  Past Surgical History:   Procedure Laterality Date     BREAST CYST INCISION AND DRAINAGE Left     sebacous cyst, Dr. Sidhu       Allergies  Dust mite extract, Grass extracts [gramineae pollens], and Shellfish allergy    Family History  Family History   Problem Relation Age of Onset     Diabetes No family hx of      Coronary Artery Disease No family hx of      Hypertension No family hx of      Hyperlipidemia No family hx of      Breast Cancer No family hx of      Cancer - colorectal No family hx of      Ovarian Cancer No family hx of      Prostate Cancer No family hx of      Other Cancer No family hx of      Mental Illness No family hx of      Cerebrovascular Disease No family hx of      Anesthesia Reaction No family hx of      Asthma No family hx of      Osteoporosis No family hx of      Known Genetic Syndrome No family hx of      Obesity No family hx of      Unknown/Adopted No family hx of      Diabetes Mother      Hypertension Mother      Hyperlipidemia  "Mother      Hepatitis Maternal Grandmother      Hypertension Maternal Grandmother      No Known Problems Sister      No Known Problems Brother      No Known Problems Daughter      No Known Problems Son        Social History  Social History     Socioeconomic History     Marital status: Single     Spouse name: Not on file     Number of children: Not on file     Years of education: Not on file     Highest education level: Not on file   Occupational History     Not on file   Tobacco Use     Smoking status: Former Smoker     Smokeless tobacco: Former User     Quit date: 8/12/2014     Tobacco comment: No exposure to second hand smoking.   Substance and Sexual Activity     Alcohol use: No     Drug use: No     Sexual activity: Not Currently     Birth control/protection: None     Comment: Does not have a partner at the moment   Other Topics Concern     Not on file   Social History Narrative    ; 4 children - (23, 21, 17, 14)       Social Determinants of Health     Financial Resource Strain: Not on file   Food Insecurity: Not on file   Transportation Needs: Not on file   Physical Activity: Not on file   Stress: Not on file   Social Connections: Not on file   Intimate Partner Violence: Not on file   Housing Stability: Not on file         Review of Systems   Complete ROS including General, HEENT, CV, Pulmonary, GI, , Genital, Neuro, Psych, MSK, Heme, Endocrine all within normal except as noted in the HPI.      Objective:   /84 (BP Location: Right arm, Patient Position: Sitting, Cuff Size: Adult Large)   Pulse 81   Temp 97.2  F (36.2  C) (Temporal)   Resp 12   Ht 1.499 m (4' 11\")   Wt 87.1 kg (192 lb)   LMP 09/21/2021   BMI 38.78 kg/m   Body mass index is 38.78 kg/m .  Wt Readings from Last 3 Encounters:   10/14/21 87.1 kg (192 lb)   09/09/21 84.4 kg (186 lb)   03/10/21 78.5 kg (173 lb)       KALEE-7 SCORE 8/17/2020 9/14/2020 11/30/2020   Total Score 8 6 12         PHQ 9/14/2020 11/30/2020 9/9/2021   PHQ-9 " Total Score 0 11 0   Q9: Thoughts of better off dead/self-harm past 2 weeks Not at all Not at all Not at all     PHQ-2 Score:     PHQ-2 ( 1999 Pfizer) 10/12/2021 3/27/2015   Q1: Little interest or pleasure in doing things 0 0   Q2: Feeling down, depressed or hopeless 0 0   PHQ-2 Score 0 0   Q1: Little interest or pleasure in doing things Not at all -   Q2: Feeling down, depressed or hopeless Not at all -   PHQ-2 Score 0 -         Complete physical exam including:  General, HEENT, Neck, breast, back, CV, Pulmonary, Abdominal, extremities, skin, neuro, psych, lymph, genital exams all within normal.    Abnormalities include:  NO RUQ pain on palpation and negative mcfarlane's sign.  No epigastric pain.      Left axilla with tender hidradenitis cysts- no redness or warmth noted.  Flat and soft- has already opened and drained.       Results for orders placed or performed in visit on 10/14/21   Hemoglobin A1c     Status: Abnormal   Result Value Ref Range    Hemoglobin A1C 5.8 (H) 0.0 - 5.6 %   Glucose     Status: None   Result Value Ref Range    Glucose 96 70 - 125 mg/dL    Patient Fasting > 8hrs? Unknown    Hepatitis C antibody     Status: Normal   Result Value Ref Range    Hepatitis C Antibody Negative Negative    Narrative    Assay performance characteristics have not been established for newborns, infants, children (<18 years) or populations of immunocompromised or immunosuppressed patients.          Bibi Colunga MD  [facility]

## 2021-10-14 NOTE — PATIENT INSTRUCTIONS
Want blood pressure to be 120/70s best but lower than 140/90     Consider metformin to help with your skin, weight loss and preventing diabetes    Preventive Health Recommendations  Female Ages 40 to 49    Yearly exam:     See your health care provider every year in order to  1. Review health changes.   2. Discuss preventive care.    3. Review your medicines if your doctor prescribed any.      Get a Pap test every three years (unless you have an abnormal result and your provider advises testing more often).      If you get Pap tests with HPV test, you only need to test every 5 years, unless you have an abnormal result. You do not need a Pap test if your uterus was removed (hysterectomy) and you have not had cancer.      You should be tested each year for STDs (sexually transmitted diseases), if you're at risk.     Ask your doctor if you should have a mammogram.      Have a colonoscopy (test for colon cancer) if someone in your family has had colon cancer or polyps before age 50.       Have a cholesterol test every 5 years.       Have a diabetes test (fasting glucose) after age 45. If you are at risk for diabetes, you should have this test every 3 years.    Shots: Get a flu shot each year. Get a tetanus shot every 10 years.     Nutrition:     Eat at least 5 servings of fruits and vegetables each day.    Eat whole-grain bread, whole-wheat pasta and brown rice instead of white grains and rice.    Get adequate Calcium and Vitamin D.      Lifestyle    Exercise at least 150 minutes a week (an average of 30 minutes a day, 5 days a week). This will help you control your weight and prevent disease.    Limit alcohol to one drink per day.    No smoking.     Wear sunscreen to prevent skin cancer.    See your dentist every six months for an exam and cleaning.

## 2021-10-15 LAB — HCV AB SERPL QL IA: NEGATIVE

## 2021-10-20 PROBLEM — R73.03 PREDIABETES: Status: ACTIVE | Noted: 2021-10-20

## 2021-10-26 ENCOUNTER — HOSPITAL ENCOUNTER (OUTPATIENT)
Dept: ULTRASOUND IMAGING | Facility: CLINIC | Age: 41
Discharge: HOME | End: 2021-10-26
Attending: FAMILY MEDICINE | Admitting: FAMILY MEDICINE
Payer: COMMERCIAL

## 2021-10-26 DIAGNOSIS — R10.11 ABDOMINAL PAIN, RIGHT UPPER QUADRANT: ICD-10-CM

## 2021-10-26 PROCEDURE — 76700 US EXAM ABDOM COMPLETE: CPT

## 2021-11-03 PROBLEM — K80.20 GALLSTONES: Status: ACTIVE | Noted: 2021-11-03

## 2021-11-20 ENCOUNTER — HEALTH MAINTENANCE LETTER (OUTPATIENT)
Age: 41
End: 2021-11-20

## 2021-11-22 ENCOUNTER — OFFICE VISIT (OUTPATIENT)
Dept: SURGERY | Facility: CLINIC | Age: 41
End: 2021-11-22
Attending: FAMILY MEDICINE
Payer: COMMERCIAL

## 2021-11-22 VITALS
SYSTOLIC BLOOD PRESSURE: 118 MMHG | DIASTOLIC BLOOD PRESSURE: 78 MMHG | HEIGHT: 59 IN | BODY MASS INDEX: 38.1 KG/M2 | WEIGHT: 189 LBS

## 2021-11-22 DIAGNOSIS — K80.50 BILIARY COLIC: Primary | ICD-10-CM

## 2021-11-22 DIAGNOSIS — K80.20 GALLSTONES: ICD-10-CM

## 2021-11-22 DIAGNOSIS — R10.11 ABDOMINAL PAIN, RIGHT UPPER QUADRANT: ICD-10-CM

## 2021-11-22 PROCEDURE — 99204 OFFICE O/P NEW MOD 45 MIN: CPT | Performed by: SURGERY

## 2021-11-22 ASSESSMENT — MIFFLIN-ST. JEOR: SCORE: 1432.93

## 2021-11-22 NOTE — LETTER
11/22/2021         RE: Anish Hauser  [address] [facility] [address]        Dear Colleague,    Thank you for referring your patient, Anish Hauser, to the [facility] [place]. Please see a copy of my visit note below.    History:   Anish Hauser is a 41 year old female referred to general surgery by Dr. Colunga for cholelithiasis.  The patient states that she developed abdominal pain at the beginning of this year.  At the time, she thought it was a side effect to starting Synthroid.  She has been having pain in the right upper quadrant.  She describes it as dull and would last 30 minutes to 4 hours.  It mostly occurs after eating meals.  It started out just being a few times a month.  Over the last month it has become more frequent.  The attacks are also more severe than what they used to be.  The pain radiates around to her flank.  She is able to continue working during the attacks.  She has never been woken up at night from these attacks.  She denies any other associated symptoms such as fever, chills, jaundice, nausea, vomiting, or diarrhea.    Allergies:  Dust mite extract, Grass extracts [gramineae pollens], and Shellfish allergy    Past medical history:  Hidradenitis  Obesity  Hypothyroidism  Hashimoto thyroiditis    Past surgical history:  None    Current medications:     clindamycin (CLEOCIN T) 1 % external lotion, Apply topically 2 times daily, Disp: 60 mL, Rfl: 11     hydrocortisone (CORTAID) 1 % external cream, Apply topically 2 times daily Apply to rash bid x 14 days, Disp: 30 g, Rfl: 0     hydrOXYzine (ATARAX) 25 MG tablet, Take 25 mg by mouth, Disp: , Rfl:      levothyroxine (SYNTHROID/LEVOTHROID) 50 MCG tablet, Take 1 tablet (50 mcg) by mouth daily, Disp: 90 tablet, Rfl: 3     metoprolol succinate ER (TOPROL-XL) 25 MG 24 hr tablet, Takes prn for racing heart/anxiety, Disp: , Rfl:     Family history:  Mother - DM, HTN, Hyperlipidemia    Social history:  Reports that  "she has quit smoking. She quit smokeless tobacco use about 7 years ago. She reports that she does not drink alcohol and does not use drugs.    Review of Systems:  General: No complaints or constitutional symptoms  Skin: No complaints or symptoms   Hematologic/Lymphatic: No symptoms or complaints  Psychiatric: No symptoms or complaints  Endocrine: No excessive fatigue, no hypermetabolic symptoms reported  Respiratory: No cough, shortness of breath, or wheezing  Cardiovascular: No chest pain or dyspnea on exertion  Gastrointestinal: As per HPI  Musculoskeletal: No recent injuries reported  Neurological: No focal neurologic defects reported.      Exam:  /78   Ht 1.499 m (4' 11\")   Wt 85.7 kg (189 lb)   Breastfeeding No   BMI 38.17 kg/m    Body mass index is 38.17 kg/m .  General: Alert, cooperative, appears stated age   Skin: Skin color, texture, turgor normal, no rashes or lesions   Lymphatic: No obvious adenopathy, no swelling   Eyes: No scleral icterus, pupils equal  HENT: No traumatic injury to the head or face, no gross abnormalities  Lungs: Normal respiratory effort, breath sounds equal bilaterally  Heart: Regular rate and rhythm  Abdomen: Obese, soft, nondistended, nontender to palpation  Musculoskeletal: No obvious swelling or deformities  Neurologic: Grossly intact      Imaging:   Pertinent images personally reviewed by myself and discussed with the patient.    Radiology reports:  EXAM: US ABDOMEN COMPLETE  LOCATION: [facility]  DATE/TIME: 10/26/2021 9:21 AM     INDICATION:  Abdominal pain, right upper quadrant  COMPARISON: None.  TECHNIQUE: Complete abdominal ultrasound.     FINDINGS:  GALLBLADDER: Multiple small (4-8 mm) gallstones identified in the gallbladder neck. No wall thickening, or pericholecystic fluid. Negative sonographic Marion's sign.  BILE DUCTS: No biliary dilatation. The common duct measures 3 mm.  LIVER: Normal parenchyma with smooth contour. No focal " mass.  RIGHT KIDNEY: Normal size measuring 9.9 cm. Normal echogenicity with no hydronephrosis or mass.   LEFT KIDNEY: Normal size measuring 9.4 cm. Normal echogenicity with no hydronephrosis or mass.   SPLEEN: Normal parenchyma and size measuring 9.8 cm.  PANCREAS: The visualized portions are normal.  AORTA: Normal in caliber.   IVC: Normal where visualized.  No ascites.                                                                   IMPRESSION:  1.  Cholelithiasis.  2.  No sonographic evidence for cholecystitis biliary obstruction.  3.  Otherwise normal complete abdominal ultrasound.    My interpretation:  Gallstones seen    Assessment/Plan:   Anish Hauser is a 41 year old female with signs and symptoms consistent with biliary colic.  I have explained the pathophysiology of gallbladder disease in detail as well as the surgical versus non-operative management strategies.  The risks of surgery and anesthesia include, but are not limited to, bleeding, infection, injury to surrounding structures, the need to convert to an open procedure, blood clots, stroke, heart attack and death.  Specifically we discussed the risk of damage to the common bile duct and hepatic vessels, and the complications that may arise from damage to these structures.  Additionally, the risks of non-operative management were discussed which include, but are not limited to, worsening infection, increased pain, sepsis and death.     She understands everything that was discussed and would like some time to think about surgery.  In the meantime, she is going to follow a low-fat diet.  She has my office's contact information if she is interested in pursuing cholecystectomy.  I tentatively placed preoperative orders for laparoscopic cholecystectomy at the outpatient surgery center under general anesthesia.  Once she is scheduled for surgery, we would schedule her for a preoperative Covid test.  Postoperative recovery and restrictions were discussed.  As  an , I would anticipate she would want to take up to a week off of work.    Julissa Mcmullen DO  General Surgeon  [facility]  Surgery [address] [address]  Office: [phone]  Employed by - [employer]      Again, thank you for allowing me to participate in the care of your patient.        Sincerely,        Julissa Mcmullen, DO

## 2021-11-22 NOTE — PROGRESS NOTES
History:   Anish Hauser is a 41 year old female referred to general surgery by Dr. Colunga for cholelithiasis.  The patient states that she developed abdominal pain at the beginning of this year.  At the time, she thought it was a side effect to starting Synthroid.  She has been having pain in the right upper quadrant.  She describes it as dull and would last 30 minutes to 4 hours.  It mostly occurs after eating meals.  It started out just being a few times a month.  Over the last month it has become more frequent.  The attacks are also more severe than what they used to be.  The pain radiates around to her flank.  She is able to continue working during the attacks.  She has never been woken up at night from these attacks.  She denies any other associated symptoms such as fever, chills, jaundice, nausea, vomiting, or diarrhea.    Allergies:  Dust mite extract, Grass extracts [gramineae pollens], and Shellfish allergy    Past medical history:  Hidradenitis  Obesity  Hypothyroidism  Hashimoto thyroiditis    Past surgical history:  None    Current medications:     clindamycin (CLEOCIN T) 1 % external lotion, Apply topically 2 times daily, Disp: 60 mL, Rfl: 11     hydrocortisone (CORTAID) 1 % external cream, Apply topically 2 times daily Apply to rash bid x 14 days, Disp: 30 g, Rfl: 0     hydrOXYzine (ATARAX) 25 MG tablet, Take 25 mg by mouth, Disp: , Rfl:      levothyroxine (SYNTHROID/LEVOTHROID) 50 MCG tablet, Take 1 tablet (50 mcg) by mouth daily, Disp: 90 tablet, Rfl: 3     metoprolol succinate ER (TOPROL-XL) 25 MG 24 hr tablet, Takes prn for racing heart/anxiety, Disp: , Rfl:     Family history:  Mother - DM, HTN, Hyperlipidemia    Social history:  Reports that she has quit smoking. She quit smokeless tobacco use about 7 years ago. She reports that she does not drink alcohol and does not use drugs.    Review of Systems:  General: No complaints or constitutional symptoms  Skin: No complaints or symptoms  "  Hematologic/Lymphatic: No symptoms or complaints  Psychiatric: No symptoms or complaints  Endocrine: No excessive fatigue, no hypermetabolic symptoms reported  Respiratory: No cough, shortness of breath, or wheezing  Cardiovascular: No chest pain or dyspnea on exertion  Gastrointestinal: As per HPI  Musculoskeletal: No recent injuries reported  Neurological: No focal neurologic defects reported.      Exam:  /78   Ht 1.499 m (4' 11\")   Wt 85.7 kg (189 lb)   Breastfeeding No   BMI 38.17 kg/m    Body mass index is 38.17 kg/m .  General: Alert, cooperative, appears stated age   Skin: Skin color, texture, turgor normal, no rashes or lesions   Lymphatic: No obvious adenopathy, no swelling   Eyes: No scleral icterus, pupils equal  HENT: No traumatic injury to the head or face, no gross abnormalities  Lungs: Normal respiratory effort, breath sounds equal bilaterally  Heart: Regular rate and rhythm  Abdomen: Obese, soft, nondistended, nontender to palpation  Musculoskeletal: No obvious swelling or deformities  Neurologic: Grossly intact      Imaging:   Pertinent images personally reviewed by myself and discussed with the patient.    Radiology reports:  EXAM: US ABDOMEN COMPLETE  LOCATION: [facility]  DATE/TIME: 10/26/2021 9:21 AM     INDICATION:  Abdominal pain, right upper quadrant  COMPARISON: None.  TECHNIQUE: Complete abdominal ultrasound.     FINDINGS:  GALLBLADDER: Multiple small (4-8 mm) gallstones identified in the gallbladder neck. No wall thickening, or pericholecystic fluid. Negative sonographic Marion's sign.  BILE DUCTS: No biliary dilatation. The common duct measures 3 mm.  LIVER: Normal parenchyma with smooth contour. No focal mass.  RIGHT KIDNEY: Normal size measuring 9.9 cm. Normal echogenicity with no hydronephrosis or mass.   LEFT KIDNEY: Normal size measuring 9.4 cm. Normal echogenicity with no hydronephrosis or mass.   SPLEEN: Normal parenchyma and size measuring 9.8 " cm.  PANCREAS: The visualized portions are normal.  AORTA: Normal in caliber.   IVC: Normal where visualized.  No ascites.                                                                   IMPRESSION:  1.  Cholelithiasis.  2.  No sonographic evidence for cholecystitis biliary obstruction.  3.  Otherwise normal complete abdominal ultrasound.    My interpretation:  Gallstones seen    Assessment/Plan:   Anish Hauser is a 41 year old female with signs and symptoms consistent with biliary colic.  I have explained the pathophysiology of gallbladder disease in detail as well as the surgical versus non-operative management strategies.  The risks of surgery and anesthesia include, but are not limited to, bleeding, infection, injury to surrounding structures, the need to convert to an open procedure, blood clots, stroke, heart attack and death.  Specifically we discussed the risk of damage to the common bile duct and hepatic vessels, and the complications that may arise from damage to these structures.  Additionally, the risks of non-operative management were discussed which include, but are not limited to, worsening infection, increased pain, sepsis and death.     She understands everything that was discussed and would like some time to think about surgery.  In the meantime, she is going to follow a low-fat diet.  She has my office's contact information if she is interested in pursuing cholecystectomy.  I tentatively placed preoperative orders for laparoscopic cholecystectomy at the outpatient surgery center under general anesthesia.  Once she is scheduled for surgery, we would schedule her for a preoperative Covid test.  Postoperative recovery and restrictions were discussed.  As an , I would anticipate she would want to take up to a week off of work.    Julissa Mcmullen DO  General Surgeon  [facility]  Surgery [address]  [place] [address]  Office:  [phone]  Employed by [facility] [facility]

## 2021-11-24 ENCOUNTER — TELEPHONE (OUTPATIENT)
Dept: SURGERY | Facility: CLINIC | Age: 41
End: 2021-11-24
Payer: COMMERCIAL

## 2021-12-17 ENCOUNTER — IMMUNIZATION (OUTPATIENT)
Dept: NURSING | Facility: CLINIC | Age: 41
End: 2021-12-17
Payer: COMMERCIAL

## 2021-12-17 PROCEDURE — 91300 PR COVID VAC PFIZER DIL RECON 30 MCG/0.3 ML IM: CPT

## 2021-12-17 PROCEDURE — 0004A PR COVID VAC PFIZER DIL RECON 30 MCG/0.3 ML IM: CPT

## 2021-12-30 ENCOUNTER — MYC MEDICAL ADVICE (OUTPATIENT)
Dept: SURGERY | Facility: CLINIC | Age: 41
End: 2021-12-30
Payer: COMMERCIAL

## 2021-12-30 NOTE — TELEPHONE ENCOUNTER
Spoke with Anish over the phone today regarding surgery scheduling with Dr. harris MSC on  date: 1/25/2022.    Covid Test: Date: 1/21/2022 at 4, Location: [facility]  Post Op: NA    Letter sent via [employer]

## 2021-12-30 NOTE — LETTER
We've received instruction to get you scheduled for surgery with Dr Mcmullen. We have that arranged as follows:     Surgery Date: 1/25/2022  Location: [address], [address] (3rd floor) [facility]  Arrival Time: 7:00 am (Unless instructed differently by the pre-op call nurse)     Prep Instructions:     1. Dr. Mcmullen will do your pre op physical the day of surgery.    2. PCR-Rated COVID19 testing is required within 4 days of surgery. We have this scheduled for you at [facility], [address] [place] [address], 1st Floor, [facility], [address] [place] [address] or [facility], [address], [place] on 1/21/2022 at 4:00 pm. Follow the specific instructions you receive by Mariia. If your test is positive, your surgery will be canceled.     3. Nothing to eat or drink for 8 hours before surgery unless instructed differently by the pre-op nurse.    4. If the community sees a new COVID19 surge, your procedure may need to be postponed. We will contact you if this happens.     5. You will need an adult to drive you home and stay with you 24 hours after surgery.     6. You may have one family member wait in the lobby at the surgery center during your surgery. Visitor restrictions are subject to change, please verify with the pre-op nurse when they call.    7. When you arrive to the surgery center, you will be screened for COVID19 symptoms. If you screen positive, your surgery will need to be postponed.    8. We always encourage you to notify your insurance any time you have medical tests or procedures scheduled including surgery. The number is usually right on the back of your insurance card. Please call [facility] Cost of Care at [phone] for the surgeon fees, and [facility] Cost of Care [phone] for facility fees if you need pricing information.     9. You will receive a call from a pre-op call nurse 1-3 days prior to surgery.  She will go over more details with you.     Call our office if you have any questions! Thank you!

## 2022-01-03 DIAGNOSIS — Z11.59 ENCOUNTER FOR SCREENING FOR OTHER VIRAL DISEASES: ICD-10-CM

## 2022-01-03 PROBLEM — K80.50 BILIARY COLIC: Status: ACTIVE | Noted: 2022-01-03

## 2022-01-21 ENCOUNTER — LAB (OUTPATIENT)
Dept: LAB | Facility: CLINIC | Age: 42
End: 2022-01-21
Payer: COMMERCIAL

## 2022-01-21 DIAGNOSIS — Z11.59 ENCOUNTER FOR SCREENING FOR OTHER VIRAL DISEASES: ICD-10-CM

## 2022-01-21 DIAGNOSIS — F43.23 ADJUSTMENT DISORDER WITH MIXED ANXIETY AND DEPRESSED MOOD: Primary | ICD-10-CM

## 2022-01-21 PROCEDURE — U0003 INFECTIOUS AGENT DETECTION BY NUCLEIC ACID (DNA OR RNA); SEVERE ACUTE RESPIRATORY SYNDROME CORONAVIRUS 2 (SARS-COV-2) (CORONAVIRUS DISEASE [COVID-19]), AMPLIFIED PROBE TECHNIQUE, MAKING USE OF HIGH THROUGHPUT TECHNOLOGIES AS DESCRIBED BY CMS-2020-01-R: HCPCS

## 2022-01-21 PROCEDURE — U0005 INFEC AGEN DETEC AMPLI PROBE: HCPCS

## 2022-01-23 LAB — SARS-COV-2 RNA RESP QL NAA+PROBE: NEGATIVE

## 2022-01-24 ENCOUNTER — ANESTHESIA EVENT (OUTPATIENT)
Dept: SURGERY | Facility: AMBULATORY SURGERY CENTER | Age: 42
End: 2022-01-24
Payer: COMMERCIAL

## 2022-01-24 RX ORDER — HYDROXYZINE HYDROCHLORIDE 25 MG/1
TABLET, FILM COATED ORAL
Qty: 90 TABLET | Refills: 2 | Status: SHIPPED | OUTPATIENT
Start: 2022-01-24 | End: 2023-08-29

## 2022-01-24 NOTE — TELEPHONE ENCOUNTER
"Outpatient Medication Detail     Disp Refills Start End NAOMI   hydrOXYzine HCL (ATARAX) 25 MG tablet 90 tablet 4 9/8/2020  No   Sig - Route: Take 1 tablet (25 mg total) by mouth at bedtime as needed for itching. - Oral   Sent to pharmacy as: hydrOXYzine HCL 25 mg tablet (ATARAX)   E-Prescribing Status: Receipt confirmed by pharmacy (9/8/2020  1:24 PM CDT)       Last office visit provider:  10/14/21     Requested Prescriptions   Pending Prescriptions Disp Refills     hydrOXYzine (ATARAX) 25 MG tablet [Pharmacy Med Name: HYDROXYZINE HCL 25MG TABS (WHITE)] 90 tablet      Sig: TAKE 1 TABLET(25 MG) BY MOUTH AT BEDTIME AS NEEDED FOR ITCHING       Antihistamines Protocol Passed - 1/21/2022  5:27 PM        Passed - Recent (12 mo) or future (30 days) visit within the authorizing provider's specialty     Patient has had an office visit with the authorizing provider or a provider within the authorizing providers department within the previous 12 mos or has a future within next 30 days. See \"Patient Info\" tab in inbasket, or \"Choose Columns\" in Meds & Orders section of the refill encounter.              Passed - Patient is age 3 or older     Apply age and/or weight-based dosing for peds patients age 3 and older.    Forward request to provider for patients under the age of 3.          Passed - Medication is active on med list           metoprolol succinate ER (TOPROL-XL) 25 MG 24 hr tablet [Pharmacy Med Name: METOPROLOL ER SUCCINATE 25MG TABS] 30 tablet      Sig: TAKE 1 TABLET(25 MG) BY MOUTH DAILY       Beta-Blockers Protocol Passed - 1/21/2022  5:27 PM        Passed - Blood pressure under 140/90 in past 12 months     BP Readings from Last 3 Encounters:   11/22/21 118/78   10/14/21 118/84   09/09/21 112/81                 Passed - Patient is age 6 or older        Passed - Recent (12 mo) or future (30 days) visit within the authorizing provider's specialty     Patient has had an office visit with the authorizing provider or a " "provider within the authorizing providers department within the previous 12 mos or has a future within next 30 days. See \"Patient Info\" tab in inbasket, or \"Choose Columns\" in Meds & Orders section of the refill encounter.              Passed - Medication is active on med list             Esequiel Rod RN 01/24/22 12:54 PM  "

## 2022-01-24 NOTE — TELEPHONE ENCOUNTER
"Outpatient Medication Detail     Disp Refills Start End NAOMI   metoprolol succinate (TOPROL-XL) 25 MG   9/14/2020  --   Class: Historical Med       metoprolol succinate (TOPROL-XL) 25 MG [[id number]]  Patient-reported historical medication  Ordering date: 11/30/20 1226 Authorized by: PROVIDER, HISTORICAL   Frequency:  09/14/20 - Until Discontinued     Routing refill request to provider for review/approval because:  Medication is reported/historical    Last office visit provider:  10/14/21     Requested Prescriptions   Pending Prescriptions Disp Refills     metoprolol succinate ER (TOPROL-XL) 25 MG 24 hr tablet [Pharmacy Med Name: METOPROLOL ER SUCCINATE 25MG TABS] 30 tablet      Sig: TAKE 1 TABLET(25 MG) BY MOUTH DAILY       Beta-Blockers Protocol Passed - 1/21/2022  5:27 PM        Passed - Blood pressure under 140/90 in past 12 months     BP Readings from Last 3 Encounters:   11/22/21 118/78   10/14/21 118/84   09/09/21 112/81                 Passed - Patient is age 6 or older        Passed - Recent (12 mo) or future (30 days) visit within the authorizing provider's specialty     Patient has had an office visit with the authorizing provider or a provider within the authorizing providers department within the previous 12 mos or has a future within next 30 days. See \"Patient Info\" tab in inbasket, or \"Choose Columns\" in Meds & Orders section of the refill encounter.              Passed - Medication is active on med list         Signed Prescriptions Disp Refills    hydrOXYzine (ATARAX) 25 MG tablet 90 tablet 2     Sig: TAKE 1 TABLET(25 MG) BY MOUTH AT BEDTIME AS NEEDED FOR ITCHING       Antihistamines Protocol Passed - 1/21/2022  5:27 PM        Passed - Recent (12 mo) or future (30 days) visit within the authorizing provider's specialty     Patient has had an office visit with the authorizing provider or a provider within the authorizing providers department within the previous 12 mos or has a future within next 30 " "days. See \"Patient Info\" tab in inbasket, or \"Choose Columns\" in Meds & Orders section of the refill encounter.              Passed - Patient is age 3 or older     Apply age and/or weight-based dosing for peds patients age 3 and older.    Forward request to provider for patients under the age of 3.          Passed - Medication is active on med list             Esequiel Rod RN 01/24/22 12:57 PM  "

## 2022-01-25 ENCOUNTER — HOSPITAL ENCOUNTER (OUTPATIENT)
Facility: AMBULATORY SURGERY CENTER | Age: 42
End: 2022-01-25
Attending: SURGERY
Payer: COMMERCIAL

## 2022-01-25 ENCOUNTER — ANESTHESIA (OUTPATIENT)
Dept: SURGERY | Facility: AMBULATORY SURGERY CENTER | Age: 42
End: 2022-01-25
Payer: COMMERCIAL

## 2022-01-25 VITALS
SYSTOLIC BLOOD PRESSURE: 115 MMHG | DIASTOLIC BLOOD PRESSURE: 77 MMHG | OXYGEN SATURATION: 95 % | BODY MASS INDEX: 36.89 KG/M2 | WEIGHT: 183 LBS | TEMPERATURE: 96.4 F | HEART RATE: 83 BPM | HEIGHT: 59 IN | RESPIRATION RATE: 16 BRPM

## 2022-01-25 DIAGNOSIS — K80.20 GALLSTONES: Primary | ICD-10-CM

## 2022-01-25 DIAGNOSIS — K80.50 BILIARY COLIC: ICD-10-CM

## 2022-01-25 LAB
HCG UR QL: NEGATIVE
INTERNAL QC OK POCT: NORMAL
POCT KIT EXPIRATION DATE: NORMAL
POCT KIT LOT NUMBER: NORMAL

## 2022-01-25 PROCEDURE — 47562 LAPAROSCOPIC CHOLECYSTECTOMY: CPT | Performed by: SURGERY

## 2022-01-25 PROCEDURE — 81025 URINE PREGNANCY TEST: CPT | Performed by: SURGERY

## 2022-01-25 RX ORDER — ONDANSETRON 2 MG/ML
4 INJECTION INTRAMUSCULAR; INTRAVENOUS EVERY 30 MIN PRN
Status: DISCONTINUED | OUTPATIENT
Start: 2022-01-25 | End: 2022-01-26 | Stop reason: HOSPADM

## 2022-01-25 RX ORDER — DEXAMETHASONE SODIUM PHOSPHATE 4 MG/ML
INJECTION, SOLUTION INTRA-ARTICULAR; INTRALESIONAL; INTRAMUSCULAR; INTRAVENOUS; SOFT TISSUE PRN
Status: DISCONTINUED | OUTPATIENT
Start: 2022-01-25 | End: 2022-01-25

## 2022-01-25 RX ORDER — ONDANSETRON 2 MG/ML
INJECTION INTRAMUSCULAR; INTRAVENOUS PRN
Status: DISCONTINUED | OUTPATIENT
Start: 2022-01-25 | End: 2022-01-25

## 2022-01-25 RX ORDER — TRAMADOL HYDROCHLORIDE 50 MG/1
50 TABLET ORAL EVERY 6 HOURS PRN
Status: DISCONTINUED | OUTPATIENT
Start: 2022-01-25 | End: 2022-01-26 | Stop reason: HOSPADM

## 2022-01-25 RX ORDER — KETOROLAC TROMETHAMINE 30 MG/ML
INJECTION, SOLUTION INTRAMUSCULAR; INTRAVENOUS PRN
Status: DISCONTINUED | OUTPATIENT
Start: 2022-01-25 | End: 2022-01-25

## 2022-01-25 RX ORDER — METOPROLOL SUCCINATE 25 MG/1
TABLET, EXTENDED RELEASE ORAL
Qty: 30 TABLET | Refills: 3 | Status: SHIPPED | OUTPATIENT
Start: 2022-01-25 | End: 2023-08-29

## 2022-01-25 RX ORDER — MEPERIDINE HYDROCHLORIDE 25 MG/ML
12.5 INJECTION INTRAMUSCULAR; INTRAVENOUS; SUBCUTANEOUS
Status: DISCONTINUED | OUTPATIENT
Start: 2022-01-25 | End: 2022-01-26 | Stop reason: HOSPADM

## 2022-01-25 RX ORDER — PROPOFOL 10 MG/ML
INJECTION, EMULSION INTRAVENOUS PRN
Status: DISCONTINUED | OUTPATIENT
Start: 2022-01-25 | End: 2022-01-25

## 2022-01-25 RX ORDER — FENTANYL CITRATE 50 UG/ML
INJECTION, SOLUTION INTRAMUSCULAR; INTRAVENOUS PRN
Status: DISCONTINUED | OUTPATIENT
Start: 2022-01-25 | End: 2022-01-25

## 2022-01-25 RX ORDER — HYDROMORPHONE HCL IN WATER/PF 6 MG/30 ML
0.2 PATIENT CONTROLLED ANALGESIA SYRINGE INTRAVENOUS EVERY 5 MIN PRN
Status: DISCONTINUED | OUTPATIENT
Start: 2022-01-25 | End: 2022-01-26 | Stop reason: HOSPADM

## 2022-01-25 RX ORDER — CEFAZOLIN SODIUM 2 G/100ML
2 INJECTION, SOLUTION INTRAVENOUS
Status: COMPLETED | OUTPATIENT
Start: 2022-01-25 | End: 2022-01-25

## 2022-01-25 RX ORDER — TRAMADOL HYDROCHLORIDE 50 MG/1
50 TABLET ORAL EVERY 6 HOURS PRN
Qty: 20 TABLET | Refills: 0 | Status: SHIPPED | OUTPATIENT
Start: 2022-01-25 | End: 2022-01-28

## 2022-01-25 RX ORDER — SODIUM CHLORIDE, SODIUM LACTATE, POTASSIUM CHLORIDE, CALCIUM CHLORIDE 600; 310; 30; 20 MG/100ML; MG/100ML; MG/100ML; MG/100ML
INJECTION, SOLUTION INTRAVENOUS CONTINUOUS
Status: DISCONTINUED | OUTPATIENT
Start: 2022-01-25 | End: 2022-01-26 | Stop reason: HOSPADM

## 2022-01-25 RX ORDER — MAGNESIUM SULFATE HEPTAHYDRATE 40 MG/ML
4 INJECTION, SOLUTION INTRAVENOUS ONCE
Status: COMPLETED | OUTPATIENT
Start: 2022-01-25 | End: 2022-01-25

## 2022-01-25 RX ORDER — ACETAMINOPHEN 325 MG/1
975 TABLET ORAL ONCE
Status: COMPLETED | OUTPATIENT
Start: 2022-01-25 | End: 2022-01-25

## 2022-01-25 RX ORDER — OXYCODONE HYDROCHLORIDE 5 MG/1
5 TABLET ORAL EVERY 4 HOURS PRN
Status: DISCONTINUED | OUTPATIENT
Start: 2022-01-25 | End: 2022-01-26 | Stop reason: HOSPADM

## 2022-01-25 RX ORDER — GLYCOPYRROLATE 0.2 MG/ML
INJECTION, SOLUTION INTRAMUSCULAR; INTRAVENOUS PRN
Status: DISCONTINUED | OUTPATIENT
Start: 2022-01-25 | End: 2022-01-25

## 2022-01-25 RX ORDER — ONDANSETRON 4 MG/1
4 TABLET, ORALLY DISINTEGRATING ORAL EVERY 30 MIN PRN
Status: DISCONTINUED | OUTPATIENT
Start: 2022-01-25 | End: 2022-01-26 | Stop reason: HOSPADM

## 2022-01-25 RX ORDER — BUPIVACAINE HYDROCHLORIDE AND EPINEPHRINE 2.5; 5 MG/ML; UG/ML
INJECTION, SOLUTION INFILTRATION; PERINEURAL PRN
Status: DISCONTINUED | OUTPATIENT
Start: 2022-01-25 | End: 2022-01-25 | Stop reason: HOSPADM

## 2022-01-25 RX ORDER — LIDOCAINE HYDROCHLORIDE 20 MG/ML
INJECTION, SOLUTION INFILTRATION; PERINEURAL PRN
Status: DISCONTINUED | OUTPATIENT
Start: 2022-01-25 | End: 2022-01-25

## 2022-01-25 RX ORDER — FENTANYL CITRATE 50 UG/ML
25 INJECTION, SOLUTION INTRAMUSCULAR; INTRAVENOUS
Status: DISCONTINUED | OUTPATIENT
Start: 2022-01-25 | End: 2022-01-26 | Stop reason: HOSPADM

## 2022-01-25 RX ORDER — LIDOCAINE 40 MG/G
CREAM TOPICAL
Status: DISCONTINUED | OUTPATIENT
Start: 2022-01-25 | End: 2022-01-26 | Stop reason: HOSPADM

## 2022-01-25 RX ORDER — PROPOFOL 10 MG/ML
INJECTION, EMULSION INTRAVENOUS CONTINUOUS PRN
Status: DISCONTINUED | OUTPATIENT
Start: 2022-01-25 | End: 2022-01-25

## 2022-01-25 RX ORDER — FENTANYL CITRATE 50 UG/ML
25 INJECTION, SOLUTION INTRAMUSCULAR; INTRAVENOUS EVERY 5 MIN PRN
Status: DISCONTINUED | OUTPATIENT
Start: 2022-01-25 | End: 2022-01-26 | Stop reason: HOSPADM

## 2022-01-25 RX ORDER — SODIUM CHLORIDE, SODIUM LACTATE, POTASSIUM CHLORIDE, AND CALCIUM CHLORIDE .6; .31; .03; .02 G/100ML; G/100ML; G/100ML; G/100ML
IRRIGANT IRRIGATION PRN
Status: DISCONTINUED | OUTPATIENT
Start: 2022-01-25 | End: 2022-01-25 | Stop reason: HOSPADM

## 2022-01-25 RX ADMIN — FENTANYL CITRATE 100 MCG: 50 INJECTION, SOLUTION INTRAMUSCULAR; INTRAVENOUS at 08:01

## 2022-01-25 RX ADMIN — PROPOFOL 200 MCG/KG/MIN: 10 INJECTION, EMULSION INTRAVENOUS at 08:01

## 2022-01-25 RX ADMIN — KETOROLAC TROMETHAMINE 15 MG: 30 INJECTION, SOLUTION INTRAMUSCULAR; INTRAVENOUS at 08:34

## 2022-01-25 RX ADMIN — LIDOCAINE HYDROCHLORIDE 60 MG: 20 INJECTION, SOLUTION INFILTRATION; PERINEURAL at 08:01

## 2022-01-25 RX ADMIN — PROPOFOL 200 MG: 10 INJECTION, EMULSION INTRAVENOUS at 08:01

## 2022-01-25 RX ADMIN — TRAMADOL HYDROCHLORIDE 50 MG: 50 TABLET ORAL at 09:46

## 2022-01-25 RX ADMIN — ACETAMINOPHEN 975 MG: 325 TABLET ORAL at 07:31

## 2022-01-25 RX ADMIN — DEXAMETHASONE SODIUM PHOSPHATE 10 MG: 4 INJECTION, SOLUTION INTRA-ARTICULAR; INTRALESIONAL; INTRAMUSCULAR; INTRAVENOUS; SOFT TISSUE at 08:01

## 2022-01-25 RX ADMIN — FENTANYL CITRATE 25 MCG: 50 INJECTION, SOLUTION INTRAMUSCULAR; INTRAVENOUS at 09:23

## 2022-01-25 RX ADMIN — MAGNESIUM SULFATE HEPTAHYDRATE 4 G: 40 INJECTION, SOLUTION INTRAVENOUS at 07:32

## 2022-01-25 RX ADMIN — CEFAZOLIN SODIUM 2 G: 2 INJECTION, SOLUTION INTRAVENOUS at 07:55

## 2022-01-25 RX ADMIN — GLYCOPYRROLATE 0.2 MG: 0.2 INJECTION, SOLUTION INTRAMUSCULAR; INTRAVENOUS at 08:01

## 2022-01-25 RX ADMIN — Medication 30 MG: at 08:02

## 2022-01-25 RX ADMIN — ONDANSETRON 4 MG: 2 INJECTION INTRAMUSCULAR; INTRAVENOUS at 08:34

## 2022-01-25 RX ADMIN — FENTANYL CITRATE 25 MCG: 50 INJECTION, SOLUTION INTRAMUSCULAR; INTRAVENOUS at 09:14

## 2022-01-25 RX ADMIN — SODIUM CHLORIDE, SODIUM LACTATE, POTASSIUM CHLORIDE, CALCIUM CHLORIDE: 600; 310; 30; 20 INJECTION, SOLUTION INTRAVENOUS at 07:30

## 2022-01-25 ASSESSMENT — LIFESTYLE VARIABLES: TOBACCO_USE: 1

## 2022-01-25 ASSESSMENT — MIFFLIN-ST. JEOR: SCORE: 1400.71

## 2022-01-25 NOTE — OP NOTE
Name:  Anish Hauser  PCP:  Bibi Colunga  Procedure Date:  1/25/2022      LAPAROSCOPIC CHOLECYSTECTOMY      Pre-Procedure Diagnosis:  Biliary colic      Post-Procedure Diagnosis:    Chronic calculus cholecystitis    Surgeon:  Julissa Mcmullen DO    Assist:  None    Anesthesia Type:    GET    Estimated Blood Loss:   2 cc    Specimens:    Gallbladder       Drains:   None    Complications:    None apparent    Indication for procedure:  This is a 42-year-old female who has had intermittent upper abdominal pain.  She has found to have cholelithiasis.  It was thought that her symptoms were due to biliary colic and she has elected for operative intervention for further treatment.    Operative Report:    After informed consent was obtained, and the risks and benefits of the procedure were discussed, the patient was brought back to the operative suite and placed in the supine position.  General endotracheal anesthesia was administered and tolerated well.  A Time Out was held, and the abdomen was prepped and draped in the usual sterile fashion.  Local anesthetic agent was injected into the skin superior and lateral to the umbilicus and an incision made.  A 5mm optical trocar was placed under direct visualization.  Pneumoperitoneum was established to a pressure of 15 mm Hg.  After local infiltration, three more ports were then placed under direct vision in the epigastrium, right subcostal midclavicular line, and right subcostal mid-axillary line.  The gallbladder was identified, the fundus grasped and retracted cephalad. The gallbladder appeared chronically inflamed with a thickened peritoneum.  The infundibulum was grasped and retracted laterally, exposing the peritoneum overlying the triangle of Calot. This was dissected bluntly and with hook electrocautery until the cystic duct was clearly identified and freed circumferentially. The same occurred with the cystic artery so that a critical view was obtained.  The junction of the  gallbladder and cystic duct was clearly identified and clipped proximally with 3 clips and on the gallbladder side with 1 clip.  The cystic duct was divided. The cystic artery was similarly clipped and cut.  The gallbladder was dissected from the liver bed in retrograde fashion with the electrocautery. The gallbladder was extracted from the 11 mm trocar site.  Hemostasis was assured.  The fascia of the 11 mm trocar site was then closed with 0 Vicryl.  Pneumoperitoneum was reduced.  The trocars were removed. The skin was then closed with 4-0 Vicryl, followed by Dermabond.    Instrument, sponge, and needle counts were correct at closure and at the conclusion of the case.     Disposition:  The patient tolerated the procedure well.  They were transferred to the postanesthesia care unit in stable condition.        Julissa Mcmullen DO  General Surgeon  [facility]  Surgery [address] [address]  Office: [phone]  Employed by - [employer]

## 2022-01-25 NOTE — DISCHARGE INSTRUCTIONS
If you have any questions or concerns regarding your procedure, please contact Dr. Julissa Mcmullen, her office number is [phone].    You have received 975 mg of Acetaminophen (Tylenol) at 7:31 am. Please do not take an additional dose of Tylenol until after 1:31 pm.    Do not exceed 4,000 mg of acetaminophen during a 24 hour period and keep in mind that acetaminophen can also be found in many over-the-counter cold medications as well as narcotics that may be given for pain.     You received an IV medication today called Toradol. You received this medication at 8:34 AM. This is a NSAID. Therefore, do not take any NSAIDS (Ibuprofen products, Advil, Motrin, etc) until 2:34 pm.    Discharge Instructions for Laparoscopic Cholecystectomy     You have had a procedure called a laparoscopic cholecystectomy. This is a surgery to remove your gallbladder. People who have this procedure often recover more quickly and have less pain than with open gallbladder surgery (called open cholecystectomy). Many surgeons advise a low-fat diet, staying away from fried food in particular, for the first month after surgery.    You can live a full and healthy life without your gallbladder. This includes eating the foods and doing the things you enjoyed before your gallbladder problems started.     Home care    Recommendations for home care include the followin. Ask someone to drive you to your appointments for the next 3 days. Don t drive until you are no longer taking pain medicine and can step on the brake pedal without hesitation.   2. Wash the skin around your cut (incision) daily with mild soap and water. It's OK to shower the day after your surgery.  3. Eat your regular diet. Try to stay away from rich, greasy, or spicy food for a few days.  4. Remember, it takes at least 1 week for you to get most of your strength and energy back.  5. If you are constipated, talk about a bowel regimen with your provider. Pain medicines can be  constipating. Increased fiber and a stool softener are often helpful.  6. Make an office visit to talk with your healthcare provider if these symptoms don t go away in a week after your surgery:  ? Extreme tiredness (fatigue)  ? Pain around the incision  ? Diarrhea or constipation  ? Loss of appetite    Coping with pain    *Pain after surgery in normal and expected*    If you have pain after surgery, pain medicine will help you feel better. Take it as told, before pain becomes severe. Also, ask your healthcare provider or pharmacist about other ways to control pain. This might be with heat, ice, or relaxation. And follow any other instructions your surgeon or nurse gives you.    When to call your healthcare provider    Call your healthcare provider right away if you have any of the following:     Yellowing of your eyes or skin (jaundice)    Chills    Fever of 101.5 F (38.6 C) or higher     Redness, swelling, increasing pain, pus, or a bad smell at the incision site    Dark or rust-colored urine    Stool that is jessica-colored or light in color instead of brown    Increasing belly pain    Rectal bleeding    Trouble breathing or shortness of breath    Leg swelling    Discharge Instructions: After Your Surgery    You ve just had surgery. During surgery, you were given medicine called anesthesia to keep you relaxed and free of pain. After surgery, you may have some pain or nausea. This is common. Here are some tips for feeling better and getting well after surgery.    Going home    Your healthcare provider will show you how to take care of yourself when you go home. He or she will also answer your questions. Have an adult family member or friend drive you home. For the first 24 hours after your surgery:      Don't drive or use heavy equipment.    Don't make important decisions or sign legal papers.    Don't drink alcohol.    Have someone stay with you for the next 24 hours. He or she can watch for problems and help keep  you safe.  Be sure to go to all follow-up visits with your healthcare provider. And rest after your surgery for as long as your healthcare provider tells you to.    Coping with pain    *Pain after surgery in normal and expected*    If you have pain after surgery, pain medicine will help you feel better. Take it as told, before pain becomes severe. Also, ask your healthcare provider or pharmacist about other ways to control pain. This might be with heat, ice, or relaxation. And follow any other instructions your surgeon or nurse gives you.    Tips for taking pain medicine    To get the best relief possible, remember these points:      Pain medicines can upset your stomach. Taking them with a little food may help.    Most pain relievers taken by mouth need at least 20 to 30 minutes to start to work.    Don't wait till your pain becomes severe before you take your medicine. Try to time your medicine so that you can take it before starting an activity. This might be before you get dressed, go for a walk, or sit down for dinner.    Constipation is a common side effect of pain medicines. You can take medicines such as laxatives (Miralax) or stool softeners to help ease constipation. Drinking lots of fluids and eating foods such as fruits and vegetables that are high in fiber can also help.    Drinking alcohol and taking pain medicine can cause dizziness and slow your breathing. It can even be deadly. Don't drink alcohol while taking pain medicine.    Pain medicine can make you react more slowly to things. Don't drive or run machinery while taking pain medicine.  Your healthcare provider may tell you to take acetaminophen to help ease your pain. Ask him or her how much you are supposed to take each day. Acetaminophen or other pain relievers may interact with your prescription medicines or other over-the-counter (OTC) medicines. Some prescription medicines have acetaminophen and other ingredients. Using both prescription  and OTC acetaminophen for pain can cause you to overdose. Read the labels on your OTC medicines with care. This will help you to clearly know the list of ingredients, how much to take, and any warnings. It may also help you not take too much acetaminophen. If you have questions or don't understand the information, ask your pharmacist or healthcare provider to explain it to you before you take the OTC medicine.    Managing nausea    Some people have an upset stomach after surgery. This is often because of anesthesia, pain, or pain medicine, or the stress of surgery. These tips will help you handle nausea and eat healthy foods as you get better. If you were on a special food plan before surgery, ask your healthcare provider if you should follow it while you get better. These tips may help:      Don't push yourself to eat. Your body will tell you when to eat and how much.    Start off with clear liquids and soup. They are easier to digest.    Next try semi-solid foods, such as mashed potatoes, applesauce, and gelatin, as you feel ready.    Slowly move to solid foods. Don t eat fatty, rich, or spicy foods at first.    Don't force yourself to have 3 large meals a day. Instead eat smaller amounts more often.    Take pain medicines with a small amount of solid food, such as crackers or toast, to prevent nausea.    If you have obstructive sleep apnea    You were given anesthesia medicine during surgery to keep you comfortable and free of pain. After surgery, you may have more apnea spells because of this medicine and other medicines you were given. The spells may last longer than usual.   At home:      Keep using the continuous positive airway pressure (CPAP) device when you sleep. Unless your healthcare provider tells you not to, use it when you sleep, day or night. CPAP is a common device used to treat obstructive sleep apnea.    Talk with your provider before taking any pain medicine, muscle relaxants, or sedatives. Your  provider will tell you about the possible dangers of taking these medicines.

## 2022-01-25 NOTE — ANESTHESIA POSTPROCEDURE EVALUATION
Patient: Anish Hauser    Procedure: Procedure(s):  CHOLECYSTECTOMY, LAPAROSCOPIC       Diagnosis:Biliary colic [K80.50]  Diagnosis Additional Information: No value filed.    Anesthesia Type:  General    Note:  Disposition: Outpatient   Postop Pain Control: Uneventful            Sign Out: Well controlled pain   PONV: No   Neuro/Psych: Uneventful            Sign Out: Acceptable/Baseline neuro status   Airway/Respiratory: Uneventful            Sign Out: Acceptable/Baseline resp. status   CV/Hemodynamics: Uneventful            Sign Out: Acceptable CV status; No obvious hypovolemia; No obvious fluid overload   Other NRE: NONE   DID A NON-ROUTINE EVENT OCCUR? No           Last vitals:  Vitals Value Taken Time   /60 01/25/22 0930   Temp 96.5  F (35.8  C) 01/25/22 0849   Pulse 88 01/25/22 0931   Resp 16 01/25/22 0930   SpO2 93 % 01/25/22 0931   Vitals shown include unvalidated device data.    Electronically Signed By: Naveed Leon MD  January 25, 2022  9:44 AM

## 2022-01-25 NOTE — ANESTHESIA CARE TRANSFER NOTE
Patient: Anish Hauser    Procedure: Procedure(s):  CHOLECYSTECTOMY, LAPAROSCOPIC       Diagnosis: Biliary colic [K80.50]  Diagnosis Additional Information: No value filed.    Anesthesia Type:   General     Note:    Oropharynx: oropharynx clear of all foreign objects and spontaneously breathing  Level of Consciousness: drowsy  Oxygen Supplementation: face mask  Level of Supplemental Oxygen (L/min / FiO2): 10  Independent Airway: airway patency satisfactory and stable  Dentition: dentition unchanged  Vital Signs Stable: post-procedure vital signs reviewed and stable  Report to RN Given: handoff report given  Patient transferred to: PACU    Handoff Report: Identifed the Patient, Identified the Reponsible Provider, Reviewed the pertinent medical history, Discussed the surgical course, Reviewed Intra-OP anesthesia mangement and issues during anesthesia, Set expectations for post-procedure period and Allowed opportunity for questions and acknowledgement of understanding      Vitals:  Vitals Value Taken Time   /67 01/25/22 0849   Temp 96.5  F (35.8  C) 01/25/22 0849   Pulse 91 01/25/22 0849   Resp 16 01/25/22 0849   SpO2 94 % 01/25/22 0849       Electronically Signed By: MARKELL TOLEDO CRNA  January 25, 2022  8:51 AM

## 2022-01-25 NOTE — ANESTHESIA PROCEDURE NOTES
Airway       Patient location during procedure: OR       Procedure Start/Stop Times: 1/25/2022 8:04 AM  Staff -        Anesthesiologist:  Naveed Leon MD       CRNA: Qi Hebert APRN CRNA       Performed By: CRNA  Consent for Airway        Urgency: elective  Indications and Patient Condition       Indications for airway management: alejandra-procedural       Induction type:intravenous       Mask difficulty assessment: 2 - vent by mask + OA or adjuvant +/- NMBA    Final Airway Details       Final airway type: endotracheal airway       Successful airway: ETT - single and Oral  Endotracheal Airway Details        ETT size (mm): 7.0       Cuffed: yes       Successful intubation technique: direct laryngoscopy       DL Blade Type: MAC 3       Grade View of Cords: 1       Adjucts: stylet and tooth guard       Position: Right       Measured from: gums/teeth       Secured at (cm): 22       Bite block used: None    Post intubation assessment        Placement verified by: capnometry, equal breath sounds and chest rise        Number of attempts at approach: 1       Number of other approaches attempted: 0       Secured with: silk tape       Ease of procedure: easy       Dentition: Intact and Unchanged

## 2022-01-25 NOTE — ANESTHESIA PREPROCEDURE EVALUATION
Anesthesia Pre-Procedure Evaluation    Patient: Anish Hauser   MRN: [id number] : 1980        Preoperative Diagnosis: Biliary colic [K80.50]    Procedure : Procedure(s):  CHOLECYSTECTOMY, LAPAROSCOPIC          Past Medical History:   Diagnosis Date     Head injury, closed, sequela 2018     Heartburn      History of angina      History of blood transfusion      Motion sickness      Seasonal allergies      Thyroid disease       Past Surgical History:   Procedure Laterality Date     BREAST CYST INCISION AND DRAINAGE Left     sebacous cyst, Dr. Sidhu      Allergies   Allergen Reactions     Dust Mite Extract Hives     Grass Extracts [Gramineae Pollens] Hives     Shellfish Allergy Hives      Social History     Tobacco Use     Smoking status: Former Smoker     Quit date:      Years since quittin.0     Smokeless tobacco: Former User     Quit date: 2014     Tobacco comment: No exposure to second hand smoking.   Substance Use Topics     Alcohol use: No      Wt Readings from Last 1 Encounters:   22 83 kg (183 lb)        Anesthesia Evaluation   Pt has had prior anesthetic.     No history of anesthetic complications       ROS/MED HX  ENT/Pulmonary:  - neg pulmonary ROS   (+) tobacco use, Past use,     Neurologic:  - neg neurologic ROS     Cardiovascular:  - neg cardiovascular ROS     METS/Exercise Tolerance:     Hematologic:  - neg hematologic  ROS     Musculoskeletal:  - neg musculoskeletal ROS     GI/Hepatic:  - neg GI/hepatic ROS     Renal/Genitourinary:  - neg Renal ROS     Endo: Comment: Pre-DM - neg endo ROS   (+) thyroid problem, hypothyroidism, Obesity (bmi 37),     Psychiatric/Substance Use:  - neg psychiatric ROS   (+) psychiatric history anxiety and depression     Infectious Disease:  - neg infectious disease ROS     Malignancy:  - neg malignancy ROS     Other:  - neg other ROS          Physical Exam    Airway        Mallampati: II   TM distance: > 3 FB   Neck ROM: full   Mouth opening: >  3 cm    Respiratory Devices and Support         Dental       (+) missing and chipped      Cardiovascular   cardiovascular exam normal          Pulmonary   pulmonary exam normal                OUTSIDE LABS:  CBC:   Lab Results   Component Value Date    WBC 6.6 02/24/2020    HGB 11.8 (L) 02/24/2020    HGB 12.2 06/05/2014    HCT 35.8 02/24/2020    HCT 39.0 06/05/2014     02/24/2020     BMP:   Lab Results   Component Value Date     08/17/2020     02/24/2020    POTASSIUM 4.4 03/02/2021    POTASSIUM 4.1 08/17/2020    CHLORIDE 107 08/17/2020    CHLORIDE 104 02/24/2020    CO2 25 08/17/2020    CO2 23 02/24/2020    BUN 11 08/17/2020    BUN 13 02/24/2020    CR 0.93 03/02/2021    CR 0.82 08/17/2020    GLC 96 10/14/2021    GLC 96 08/17/2020     COAGS: No results found for: PTT, INR, FIBR  POC:   Lab Results   Component Value Date    HCG Negative 01/25/2022     HEPATIC:   Lab Results   Component Value Date    ALBUMIN 3.7 08/17/2020    PROTTOTAL 7.6 08/17/2020    ALT 12 08/17/2020    AST 14 08/17/2020    ALKPHOS 71 08/17/2020    BILITOTAL 0.6 08/17/2020     OTHER:   Lab Results   Component Value Date    A1C 5.8 (H) 10/14/2021    RAFAEL 8.6 08/17/2020    TSH 2.04 08/10/2021    T4 1.13 08/10/2021    T3 57 10/21/2020       Anesthesia Plan    ASA Status:  3   NPO Status:  NPO Appropriate    Anesthesia Type: General.     - Airway: ETT   Induction: Propofol, Intravenous.   Maintenance: TIVA.        Consents    Anesthesia Plan(s) and associated risks, benefits, and realistic alternatives discussed. Questions answered and patient/representative(s) expressed understanding.    - Discussed:     - Discussed with:  Patient         Postoperative Care    Pain management: Multi-modal analgesia.   PONV prophylaxis: Ondansetron (or other 5HT-3), Dexamethasone or Solumedrol     Comments:    Other Comments: Reviewed anesthetic options and risks, including risk of dental trauma. Patient agrees to proceed.     Recent Results (from the  past 120 hour(s))  -Asymptomatic COVID-19 Virus (Coronavirus) by PCR Nose:   Collection Time: 01/21/22  4:05 PM  Specimen: Nose; Swab       Result                                            Value                         Ref Range                       SARS CoV2 PCR                                     Negative                      Negative, Testing sent t*  -hCG qual urine POCT:   Collection Time: 01/25/22  7:21 AM       Result                                            Value                         Ref Range                       HCG Qual Urine                                    Negative                      Negative                        Internal QC Check POCT                            Valid                         Valid                           POCT Kit Lot Number                               [id number]                                                        POCT Kit Expiration Date                          2023-9                                                             Naveed Leon MD

## 2022-01-25 NOTE — H&P
"History:  This is a 42 year old female who presents for cholecystectomy.  She has had a years worth of intermittent abdominal pains.  She is exceptionally nervous today.  She is starting to compare her situation to family members who had their gallbladders removed on more urgent basis.  Since she was last seen in the office, she denies any new medical history.    Allergies:  Dust mite extract, Grass extracts [gramineae pollens], and Shellfish allergy     Past medical history:  Hidradenitis  Obesity  Hypothyroidism  Hashimoto thyroiditis     Past surgical history:  None     Current medications:     clindamycin (CLEOCIN T) 1 % external lotion, Apply topically 2 times daily, Disp: 60 mL, Rfl: 11     hydrocortisone (CORTAID) 1 % external cream, Apply topically 2 times daily Apply to rash bid x 14 days, Disp: 30 g, Rfl: 0     hydrOXYzine (ATARAX) 25 MG tablet, Take 25 mg by mouth, Disp: , Rfl:      levothyroxine (SYNTHROID/LEVOTHROID) 50 MCG tablet, Take 1 tablet (50 mcg) by mouth daily, Disp: 90 tablet, Rfl: 3     metoprolol succinate ER (TOPROL-XL) 25 MG 24 hr tablet, Takes prn for racing heart/anxiety, Disp: , Rfl:      Family history:  Mother - DM, HTN, Hyperlipidemia     Social history:  Reports that she has quit smoking. She quit smokeless tobacco use about 7 years ago. She reports that she does not drink alcohol and does not use drugs.    Physical:  /88   Temp 98  F (36.7  C) (Temporal)   Resp 16   Ht 1.499 m (4' 11\")   Wt 83 kg (183 lb)   SpO2 97%   Breastfeeding No   BMI 36.96 kg/m    General: Alert, cooperative, appears stated age   Skin: Skin color, texture, turgor normal, no rashes or lesions   Lymphatic: No obvious adenopathy, no swelling   Eyes: No scleral icterus, pupils equal  HENT: No traumatic injury to the head or face, no gross abnormalities  Lungs: Normal respiratory effort, breath sounds equal bilaterally  Heart: Regular rate and rhythm  Abdomen: Soft, non-distended and non-tender to " palpation  Neurologic: Grossly intact    IMPRESSION:  Biliary colic    PLAN:   To the OR for laparoscopic cholecystectomy.  All questions answered guarding procedure.  Consent obtained.  Postoperative recovery and restrictions reviewed.    Julissa Mcmullen DO  General Surgeon  [employer]  [facility] - [address]  [place] [address]  Office: [phone]  Employed by - [facility]

## 2022-02-08 NOTE — PROGRESS NOTES
"Anish is a 42 year old who is being evaluated via a billable video visit.      How would you like to obtain your AVS? MyChart  If the video visit is dropped, the invitation should be resent by: Text to cell phone: [phone]  Will anyone else be joining your video visit? No      Video Start Time: 1100    [facility] ENDOCRINOLOGY    Thyroid Note  2/15/2022    Anish Hauser, 1980, [id number]          Reason for visit      1. Hypothyroidism due to Hashimoto's thyroiditis        HPI     Anish Hauser is a very pleasant 42 year old old female who presents for follow up.  SUMMARY:  Anish is contacted today via Video Visit in f/u for Hashimoto's disorder. She reports that she is feeling well. She reports that she has been \"doing fine\" and has no concerns regarding weight gain, hair loss, fatigue, or hot or cold regulation issues. She remains on 50 mcg of Levothyroxine daily. Her current TSH is 2.09 and fT4 is 0.90. She is having no problems referable to her neck.       Past Medical History     Patient Active Problem List   Diagnosis     Adjustment disorder with mixed anxiety and depressed mood     Morbid obesity (H)     Hypothyroidism due to Hashimoto's thyroiditis     Hidradenitis     Prediabetes     Gallstones     Biliary colic       Family History       family history includes Diabetes in her mother; Hepatitis in her maternal grandmother; Hyperlipidemia in her mother; Hypertension in her maternal grandmother and mother; No Known Problems in her brother, daughter, sister, and son.    Social History      reports that she quit smoking about 13 years ago. She quit smokeless tobacco use about 7 years ago. She reports that she does not drink alcohol and does not use drugs.      Review of Systems     Patient denies fatigue, weight changes, heat/cold intolerance, bowel/skin changes or CVS symptoms.   Remainder per HPI and per attached intake form.      Vital Signs     There were no vitals taken for this visit.  Wt Readings " from Last 3 Encounters:   01/25/22 83 kg (183 lb)   11/22/21 85.7 kg (189 lb)   10/14/21 87.1 kg (192 lb)       Physical Exam     Constitutional:  Well developed, Well nourished  HENT:  Normocephalic,   Neck: normal in appearance  Eyes:  PERRL, Conjunctiva pink  Respiratory:  No respiratory distress  Skin: No acanthosis nigricans, lipoatrophy or lipodystrophy  Neurologic:  Alert & oriented x 3, nonfocal  Psychiatric:  Affect, Mood, Insight appropriate          Assessment     1. Hypothyroidism due to Hashimoto's thyroiditis            Plan     Anish is currently bio-chemically and physically euthyroid. She will remain on her current dost of Levothyroxine and will f/u with me in 6 months.         Kathy Brown NP   Endocrinology  2/15/2022  10:59 AM      Lab Results     TSH   Date Value Ref Range Status   02/11/2022 2.09 0.30 - 5.00 uIU/mL Final     No components found for: THYROIDAB    No results found for: [id number]    Imaging Results   Last thyroid ultrasound:  No results found for this or any previous visit.      Last thyroid nuclear scan:  No results found for this or any previous visit.      Current Medications     Outpatient Medications Prior to Visit   Medication Sig Dispense Refill     clindamycin (CLEOCIN T) 1 % external lotion Apply topically 2 times daily 60 mL 11     hydrocortisone (CORTAID) 1 % external cream Apply topically 2 times daily Apply to rash bid x 14 days 30 g 0     hydrOXYzine (ATARAX) 25 MG tablet TAKE 1 TABLET(25 MG) BY MOUTH AT BEDTIME AS NEEDED FOR ITCHING 90 tablet 2     metoprolol succinate ER (TOPROL-XL) 25 MG 24 hr tablet TAKE 1 TABLET(25 MG) BY MOUTH DAILY 30 tablet 3     levothyroxine (SYNTHROID/LEVOTHROID) 50 MCG tablet Take 1 tablet (50 mcg) by mouth daily 90 tablet 3     No facility-administered medications prior to visit.             Video-Visit Details    Type of service:  Video Visit    Video End Time:1120      Originating Location (pt. Location): Home    Distant Location  (provider location):  [facility]     Platform used for Video Visit: Doximity    Date of last OV: 8/17/21  Reason for Visit: Hashimotos Thyroiditis

## 2022-02-11 ENCOUNTER — LAB (OUTPATIENT)
Dept: LAB | Facility: CLINIC | Age: 42
End: 2022-02-11
Payer: COMMERCIAL

## 2022-02-11 DIAGNOSIS — E06.3 HASHIMOTO'S THYROIDITIS: ICD-10-CM

## 2022-02-11 LAB
ANION GAP SERPL CALCULATED.3IONS-SCNC: 10 MMOL/L (ref 5–18)
BUN SERPL-MCNC: 13 MG/DL (ref 8–22)
CALCIUM SERPL-MCNC: 8.9 MG/DL (ref 8.5–10.5)
CHLORIDE BLD-SCNC: 106 MMOL/L (ref 98–107)
CO2 SERPL-SCNC: 24 MMOL/L (ref 22–31)
CREAT SERPL-MCNC: 0.83 MG/DL (ref 0.6–1.1)
GFR SERPL CREATININE-BSD FRML MDRD: 90 ML/MIN/1.73M2
GLUCOSE BLD-MCNC: 96 MG/DL (ref 70–125)
POTASSIUM BLD-SCNC: 4 MMOL/L (ref 3.5–5)
SODIUM SERPL-SCNC: 140 MMOL/L (ref 136–145)
T4 FREE SERPL-MCNC: 0.9 NG/DL (ref 0.7–1.8)
TSH SERPL DL<=0.005 MIU/L-ACNC: 2.09 UIU/ML (ref 0.3–5)

## 2022-02-11 PROCEDURE — 84439 ASSAY OF FREE THYROXINE: CPT

## 2022-02-11 PROCEDURE — 84443 ASSAY THYROID STIM HORMONE: CPT

## 2022-02-11 PROCEDURE — 80048 BASIC METABOLIC PNL TOTAL CA: CPT

## 2022-02-11 PROCEDURE — 36415 COLL VENOUS BLD VENIPUNCTURE: CPT

## 2022-02-15 ENCOUNTER — VIRTUAL VISIT (OUTPATIENT)
Dept: ENDOCRINOLOGY | Facility: CLINIC | Age: 42
End: 2022-02-15
Payer: COMMERCIAL

## 2022-02-15 DIAGNOSIS — E06.3 HYPOTHYROIDISM DUE TO HASHIMOTO'S THYROIDITIS: Primary | ICD-10-CM

## 2022-02-15 PROCEDURE — 99214 OFFICE O/P EST MOD 30 MIN: CPT | Mod: 95 | Performed by: NURSE PRACTITIONER

## 2022-02-15 RX ORDER — LEVOTHYROXINE SODIUM 50 UG/1
50 TABLET ORAL DAILY
Qty: 90 TABLET | Refills: 3 | Status: SHIPPED | OUTPATIENT
Start: 2022-02-15 | End: 2022-08-17

## 2022-02-24 DIAGNOSIS — Z84.89 FAMILY HISTORY OF PROTEINURIA SYNDROME: Primary | ICD-10-CM

## 2022-03-18 ENCOUNTER — LAB (OUTPATIENT)
Dept: LAB | Facility: CLINIC | Age: 42
End: 2022-03-18
Payer: COMMERCIAL

## 2022-03-18 DIAGNOSIS — Z84.89 FAMILY HISTORY OF PROTEINURIA SYNDROME: ICD-10-CM

## 2022-03-18 LAB
ALBUMIN UR-MCNC: NEGATIVE MG/DL
APPEARANCE UR: CLEAR
BACTERIA #/AREA URNS HPF: ABNORMAL /HPF
BILIRUB UR QL STRIP: NEGATIVE
COLOR UR AUTO: YELLOW
GLUCOSE UR STRIP-MCNC: NEGATIVE MG/DL
HGB UR QL STRIP: ABNORMAL
HYALINE CASTS #/AREA URNS LPF: ABNORMAL /LPF
KETONES UR STRIP-MCNC: NEGATIVE MG/DL
LEUKOCYTE ESTERASE UR QL STRIP: ABNORMAL
MUCOUS THREADS #/AREA URNS LPF: PRESENT /LPF
NITRATE UR QL: NEGATIVE
PH UR STRIP: 6 [PH] (ref 5–8)
RBC #/AREA URNS AUTO: ABNORMAL /HPF
SP GR UR STRIP: 1.02 (ref 1–1.03)
SQUAMOUS #/AREA URNS AUTO: ABNORMAL /LPF
UROBILINOGEN UR STRIP-ACNC: 0.2 E.U./DL
WBC #/AREA URNS AUTO: ABNORMAL /HPF

## 2022-03-18 PROCEDURE — 81001 URINALYSIS AUTO W/SCOPE: CPT

## 2022-08-08 ENCOUNTER — LAB (OUTPATIENT)
Dept: LAB | Facility: CLINIC | Age: 42
End: 2022-08-08
Payer: COMMERCIAL

## 2022-08-08 DIAGNOSIS — E06.3 HYPOTHYROIDISM DUE TO HASHIMOTO'S THYROIDITIS: ICD-10-CM

## 2022-08-08 LAB
CREAT SERPL-MCNC: 0.92 MG/DL (ref 0.51–0.95)
GFR SERPL CREATININE-BSD FRML MDRD: 80 ML/MIN/1.73M2
T4 FREE SERPL-MCNC: 1.13 NG/DL (ref 0.9–1.7)
TSH SERPL DL<=0.005 MIU/L-ACNC: 2.99 UIU/ML (ref 0.3–4.2)

## 2022-08-08 PROCEDURE — 82306 VITAMIN D 25 HYDROXY: CPT

## 2022-08-08 PROCEDURE — 84443 ASSAY THYROID STIM HORMONE: CPT

## 2022-08-08 PROCEDURE — 82565 ASSAY OF CREATININE: CPT

## 2022-08-08 PROCEDURE — 84439 ASSAY OF FREE THYROXINE: CPT

## 2022-08-08 PROCEDURE — 36415 COLL VENOUS BLD VENIPUNCTURE: CPT

## 2022-08-09 LAB — DEPRECATED CALCIDIOL+CALCIFEROL SERPL-MC: 36 UG/L (ref 20–75)

## 2022-08-16 ENCOUNTER — VIRTUAL VISIT (OUTPATIENT)
Dept: ENDOCRINOLOGY | Facility: CLINIC | Age: 42
End: 2022-08-16
Payer: COMMERCIAL

## 2022-08-16 DIAGNOSIS — E06.3 HYPOTHYROIDISM DUE TO HASHIMOTO'S THYROIDITIS: Primary | ICD-10-CM

## 2022-08-16 PROCEDURE — 99213 OFFICE O/P EST LOW 20 MIN: CPT | Mod: 95 | Performed by: NURSE PRACTITIONER

## 2022-08-16 RX ORDER — LEVOTHYROXINE SODIUM 75 UG/1
75 TABLET ORAL DAILY
Qty: 90 TABLET | Refills: 3 | Status: SHIPPED | OUTPATIENT
Start: 2022-08-16 | End: 2023-08-30

## 2022-08-16 NOTE — PROGRESS NOTES
Anish is a 42 year old who is being evaluated via a billable video visit.      How would you like to obtain your AVS? MyChart  If the video visit is dropped, the invitation should be resent by: Text to cell phone: [phone]  Will anyone else be joining your video visit? [facility] ENDOCRINOLOGY    Thyroid Note  8/16/2022    Anish Hauser, 1980, [id number]          Reason for visit      1. Hypothyroidism due to Hashimoto's thyroiditis        HPI     Anish Hauser is a very pleasant 42 year old old female who presents for follow up.  SUMMARY:    Anish is contacted today in f/u for Hypothyroidism.  She reports that she is still experiencing some significant hair loss.  Her weight, has been fluctuating. She states that the fatigue is somewhat manageable. She is currently taking 50 mcg of Levothyroxine daily on an empty stomach. Her current TSH is 2.99 and has been getting higher with each year. Her current fT4 is 1.13.  Vit D level is 36. She is having no problems referable to her neck.     Past Medical History     Patient Active Problem List   Diagnosis     Adjustment disorder with mixed anxiety and depressed mood     Morbid obesity (H)     Hypothyroidism due to Hashimoto's thyroiditis     Hidradenitis     Prediabetes     Gallstones     Biliary colic       Family History       family history includes Diabetes in her mother; Hepatitis in her maternal grandmother; Hyperlipidemia in her mother; Hypertension in her maternal grandmother and mother; No Known Problems in her brother, daughter, sister, and son.    Social History      reports that she quit smoking about 13 years ago. She quit smokeless tobacco use about 8 years ago. She reports that she does not drink alcohol and does not use drugs.      Review of Systems     Patient denies fatigue, weight changes, heat/cold intolerance, bowel/skin changes or CVS symptoms.   Remainder per HPI and per attached intake form.      Vital Signs     There were no vitals  taken for this visit.  Wt Readings from Last 3 Encounters:   01/25/22 83 kg (183 lb)   11/22/21 85.7 kg (189 lb)   10/14/21 87.1 kg (192 lb)       Physical Exam     Constitutional:  Well developed, Well nourished  HENT:  Normocephalic,   Neck: normal in appearance  Eyes:  PERRL, Conjunctiva pink  Respiratory:  No respiratory distress  Skin: No acanthosis nigricans, lipoatrophy or lipodystrophy  Neurologic:  Alert & oriented x 3, nonfocal  Psychiatric:  Affect, Mood, Insight appropriate          Assessment     1. Hypothyroidism due to Hashimoto's thyroiditis            Plan     Increased Levothyroxine dose to 75 mcg daily. She will retest labs in 2 months.         Kathy Brown NP  [facility]  8/16/2022  1:16 PM    Lab Results     TSH   Date Value Ref Range Status   08/08/2022 2.99 0.30 - 4.20 uIU/mL Final   02/11/2022 2.09 0.30 - 5.00 uIU/mL Final     No components found for: THYROIDAB    No results found for: X3GQOMA    Imaging Results   Last thyroid ultrasound:  No results found for this or any previous visit.      Last thyroid nuclear scan:  No results found for this or any previous visit.      Current Medications     Outpatient Medications Prior to Visit   Medication Sig Dispense Refill     clindamycin (CLEOCIN T) 1 % external lotion Apply topically 2 times daily 60 mL 11     hydrocortisone (CORTAID) 1 % external cream Apply topically 2 times daily Apply to rash bid x 14 days 30 g 0     hydrOXYzine (ATARAX) 25 MG tablet TAKE 1 TABLET(25 MG) BY MOUTH AT BEDTIME AS NEEDED FOR ITCHING 90 tablet 2     levothyroxine (SYNTHROID/LEVOTHROID) 50 MCG tablet Take 1 tablet (50 mcg) by mouth daily 90 tablet 3     metoprolol succinate ER (TOPROL-XL) 25 MG 24 hr tablet TAKE 1 TABLET(25 MG) BY MOUTH DAILY 30 tablet 3     No facility-administered medications prior to visit.           Video-Visit Details    Video Start Time: 1130    Type of service:  Video Visit    Video End Time:1150    Originating Location (pt.  Location): Home    Distant Location (provider location):  [facility]     Platform used for Video Visit: Emiliano    Date of last OV: 2/15/22  Reason for Visit: Hypothyroidism

## 2022-08-17 ENCOUNTER — OFFICE VISIT (OUTPATIENT)
Dept: INTERNAL MEDICINE | Facility: CLINIC | Age: 42
End: 2022-08-17
Payer: COMMERCIAL

## 2022-08-17 VITALS
HEART RATE: 78 BPM | SYSTOLIC BLOOD PRESSURE: 136 MMHG | HEIGHT: 59 IN | DIASTOLIC BLOOD PRESSURE: 88 MMHG | BODY MASS INDEX: 38.91 KG/M2 | WEIGHT: 193 LBS | OXYGEN SATURATION: 100 %

## 2022-08-17 DIAGNOSIS — G50.0 TRIGEMINAL NEURALGIA OF RIGHT SIDE OF FACE: Primary | ICD-10-CM

## 2022-08-17 DIAGNOSIS — E06.3 HYPOTHYROIDISM DUE TO HASHIMOTO'S THYROIDITIS: ICD-10-CM

## 2022-08-17 DIAGNOSIS — R20.2 PARESTHESIA OF BOTH HANDS: ICD-10-CM

## 2022-08-17 PROCEDURE — 99214 OFFICE O/P EST MOD 30 MIN: CPT | Performed by: INTERNAL MEDICINE

## 2022-08-17 NOTE — PROGRESS NOTES
Anish Hauser   42 year old female    Date of Visit: 8/17/2022    Chief Complaint   Patient presents with     Pain     Behind right ear into jaw off and on since yesterday     Subjective  42-year-old female with history of Hashimoto's and hypothyroid, also prediabetes.    She had some braces put on 1 week ago.  She then went camping over the weekend.    She felt okay Monday but did sleep quite hard on Monday night.    Yesterday morning she woke up with an intermittent sharp stabbing shooting pain just behind her right ear radiating down her jaw.  It lasts just seconds at a time but she will have multiple episodes during the day, sometimes they occur in rapid pulsations.  She does not have any pain in the jaw currently.  There is no change in hearing.  No ear pain.  No sinusitis symptoms.  No sore throat or cough or fever.  No problems swallowing.  No rash.    No trauma.  She has not had this before.    She also described an episode after putting some hands appetizer on her hands that her fingers became somewhat numb and tingling all that day.  No hand weakness.  No hand symptoms currently.    Has history of hypothyroid, she has been having some fatigue and hair loss, TSH was up to 2.9 earlier this month and her Synthroid was increased to 75 mcg by endocrinology.        PMHx:    Past Medical History:   Diagnosis Date     Head injury, closed, sequela 1/16/2018     Heartburn      History of angina      History of blood transfusion      Motion sickness      Seasonal allergies      Thyroid disease      PSHx:    Past Surgical History:   Procedure Laterality Date     BREAST CYST INCISION AND DRAINAGE Left     sebacous cyst, Dr. Sidhu     LAPAROSCOPIC CHOLECYSTECTOMY N/A 1/25/2022    Procedure: CHOLECYSTECTOMY, LAPAROSCOPIC;  Surgeon: Julissa Mcmullen DO;  Location: [facility]     Immunizations:   Immunization History   Administered Date(s) Administered     COVID-19,PF,Pfizer (12+ Yrs) 04/30/2021, 05/21/2021,  "12/17/2021     FLU 6-35 months 01/25/2018     Flu, Unspecified 08/27/2012     Influenza (IIV3) PF 08/27/2012     Influenza Vaccine IM > 6 months Valent IIV4 (Alfuria,Fluzone) 02/24/2020, 10/21/2020, 10/14/2021     Tdap (Adacel,Boostrix) 08/27/2012, 08/04/2014       ROS A comprehensive review of systems was performed and was otherwise negative    Medications, allergies, and problem list were reviewed and updated    Exam  /88 (BP Location: Right arm, Patient Position: Sitting)   Pulse 78   Ht 1.499 m (4' 11\")   Wt 87.5 kg (193 lb)   SpO2 100%   BMI 38.98 kg/m    Mildly overweight female.  Pupils and irises equal and reactive.  Extraocular muscles intact.  Tympanic membrane is normal and ear canals normal.  There is no tenderness around the ear and the external ear appears normal.  There is no significant tenderness to palpation around the ear.  The parotid gland is normal.  Skin is normal.  There is no soft tissue abnormality.  No neck mass or adenopathy.  There are some mild clicking with jaw opening and closing but she is not having jaw pain.  The oral mucosa appears normal.  She is missing her right lower molar.  She does have braces on.  There was no tenderness to palpation of the molar or teeth.  Lungs are clear.  Heart is regular without murmur.  No ankle edema.  Hands appear normal.    Assessment/Plan  1. Trigeminal neuralgia of right side of face  Consistent with an irritated trigeminal nerve, V3 distribution on the right.  I suspect her dental work and having her braces placed last week may have precipitated some irritation of the trigeminal nerve.  Her camping and sleeping hard earlier this week may also have affected things.    Would anticipate this symptom resolving over the next few days.  But see patient instructions if it does not improve.    We discussed gabapentin as an option but she declined.  I did discuss sedation risk with gabapentin.    2. Hypothyroidism due to Hashimoto's " thyroiditis  She will increase the dose to 75 mcg of Synthroid as instructed by endocrinology yesterday    3. Paresthesia of both hands  Suggestive of mild median nerve irritation, possibly from carpal tunnel from her work or from rubbing her hands together with a hand .  She was not describing a hyperventilation paresthesia episode.  I do not suspect diabetic neuropathy, she has no foot symptoms and just has prediabetes.  She does not have any symptoms now in her hands.  Follow-up if that worsens.      Return in about 2 months (around 10/17/2022) for Routine preventive.   Patient Instructions   You are describing trigeminal neuralgia on the V3 distribution of your right trigeminal nerve.  Your recent dental work may have precipitated the neuralgia.  You would expect the neuralgia to improve over time, hopefully just a few days.    You can take ibuprofen if needed.  If pain becomes more severe, you could consider asking for gabapentin medication.    If the pain becomes more severe, spreads to other areas of your body, or is not better in 2 weeks, see your primary care provider for further evaluation.    You are likely describing compressive neuropathy symptoms of your fingers, possibly from carpal tunnel.  But as they are mild, intermittent and no longer present, I would not have you further evaluated for carpal tunnel at this time.  Follow-up with your primary care clinic if the hand symptoms worsen.    Follow-up your primary care clinic this fall for physical exam.    Robert Desai MD, MD        Current Outpatient Medications   Medication Sig Dispense Refill     clindamycin (CLEOCIN T) 1 % external lotion Apply topically 2 times daily 60 mL 11     hydrOXYzine (ATARAX) 25 MG tablet TAKE 1 TABLET(25 MG) BY MOUTH AT BEDTIME AS NEEDED FOR ITCHING 90 tablet 2     metoprolol succinate ER (TOPROL-XL) 25 MG 24 hr tablet TAKE 1 TABLET(25 MG) BY MOUTH DAILY 30 tablet 3     levothyroxine (SYNTHROID/LEVOTHROID) 75  MCG tablet Take 1 tablet (75 mcg) by mouth daily (Patient not taking: Reported on 2022) 90 tablet 3     Allergies   Allergen Reactions     Dust Mite Extract Hives     Grass Extracts [Gramineae Pollens] Hives     Shellfish Allergy Hives     Social History     Tobacco Use     Smoking status: Former Smoker     Quit date:      Years since quittin.6     Smokeless tobacco: Former User     Quit date: 2014     Tobacco comment: No exposure to second hand smoking.   Substance Use Topics     Alcohol use: No     Drug use: No             Subjective   Anish is a 42 year old, presenting for the following health issues:  Pain (Behind right ear into jaw off and on since yesterday)      Pain    History of Present Illness       Reason for visit:  Piercing pain behind right eat, upper neck and jawline  Symptom onset:  1-3 days ago    She eats 2-3 servings of fruits and vegetables daily.She consumes 1 sweetened beverage(s) daily.She exercises with enough effort to increase her heart rate 10 to 19 minutes per day.  She exercises with enough effort to increase her heart rate 3 or less days per week.   She is taking medications regularly.             Review of Systems         Objective    There were no vitals taken for this visit.  There is no height or weight on file to calculate BMI.  Physical Exam                       .  ..

## 2022-08-17 NOTE — PATIENT INSTRUCTIONS
You are describing trigeminal neuralgia on the V3 distribution of your right trigeminal nerve.  Your recent dental work may have precipitated the neuralgia.  You would expect the neuralgia to improve over time, hopefully just a few days.    You can take ibuprofen if needed.  If pain becomes more severe, you could consider asking for gabapentin medication.    If the pain becomes more severe, spreads to other areas of your body, or is not better in 2 weeks, see your primary care provider for further evaluation.    You are likely describing compressive neuropathy symptoms of your fingers, possibly from carpal tunnel.  But as they are mild, intermittent and no longer present, I would not have you further evaluated for carpal tunnel at this time.  Follow-up with your primary care clinic if the hand symptoms worsen.    Follow-up your primary care clinic this fall for physical exam.

## 2022-10-17 ENCOUNTER — LAB (OUTPATIENT)
Dept: LAB | Facility: CLINIC | Age: 42
End: 2022-10-17
Payer: COMMERCIAL

## 2022-10-17 DIAGNOSIS — E06.3 HYPOTHYROIDISM DUE TO HASHIMOTO'S THYROIDITIS: ICD-10-CM

## 2022-10-17 LAB
T4 FREE SERPL-MCNC: 1.14 NG/DL (ref 0.9–1.7)
TSH SERPL DL<=0.005 MIU/L-ACNC: 0.13 UIU/ML (ref 0.3–4.2)

## 2022-10-17 PROCEDURE — 36415 COLL VENOUS BLD VENIPUNCTURE: CPT

## 2022-10-17 PROCEDURE — 84443 ASSAY THYROID STIM HORMONE: CPT

## 2022-10-17 PROCEDURE — 84439 ASSAY OF FREE THYROXINE: CPT

## 2022-11-07 DIAGNOSIS — E06.3 HASHIMOTO'S THYROIDITIS: ICD-10-CM

## 2022-11-07 RX ORDER — LEVOTHYROXINE SODIUM 50 UG/1
TABLET ORAL
Qty: 90 TABLET | Refills: 3 | OUTPATIENT
Start: 2022-11-07

## 2022-11-23 ENCOUNTER — OFFICE VISIT (OUTPATIENT)
Dept: FAMILY MEDICINE | Facility: CLINIC | Age: 42
End: 2022-11-23
Payer: COMMERCIAL

## 2022-11-23 VITALS
OXYGEN SATURATION: 99 % | HEART RATE: 80 BPM | RESPIRATION RATE: 20 BRPM | HEIGHT: 59 IN | BODY MASS INDEX: 39.72 KG/M2 | WEIGHT: 197 LBS | DIASTOLIC BLOOD PRESSURE: 87 MMHG | SYSTOLIC BLOOD PRESSURE: 133 MMHG

## 2022-11-23 DIAGNOSIS — E06.3 HYPOTHYROIDISM DUE TO HASHIMOTO'S THYROIDITIS: ICD-10-CM

## 2022-11-23 DIAGNOSIS — Z00.00 ROUTINE GENERAL MEDICAL EXAMINATION AT A HEALTH CARE FACILITY: Primary | ICD-10-CM

## 2022-11-23 DIAGNOSIS — R73.03 PREDIABETES: ICD-10-CM

## 2022-11-23 DIAGNOSIS — K92.1 BLOODY STOOLS: ICD-10-CM

## 2022-11-23 DIAGNOSIS — E66.01 MORBID OBESITY (H): ICD-10-CM

## 2022-11-23 PROBLEM — K80.20 GALLSTONES: Status: RESOLVED | Noted: 2021-11-03 | Resolved: 2022-11-23

## 2022-11-23 PROBLEM — K80.50 BILIARY COLIC: Status: RESOLVED | Noted: 2022-01-03 | Resolved: 2022-11-23

## 2022-11-23 LAB
ALBUMIN SERPL BCG-MCNC: 4 G/DL (ref 3.5–5.2)
ALBUMIN UR-MCNC: NEGATIVE MG/DL
ALP SERPL-CCNC: 73 U/L (ref 35–104)
ALT SERPL W P-5'-P-CCNC: <5 U/L (ref 10–35)
ANION GAP SERPL CALCULATED.3IONS-SCNC: 15 MMOL/L (ref 7–15)
APPEARANCE UR: CLEAR
AST SERPL W P-5'-P-CCNC: 30 U/L (ref 10–35)
BACTERIA #/AREA URNS HPF: ABNORMAL /HPF
BILIRUB SERPL-MCNC: 0.5 MG/DL
BILIRUB UR QL STRIP: NEGATIVE
BUN SERPL-MCNC: 9 MG/DL (ref 6–20)
CALCIUM SERPL-MCNC: 9 MG/DL (ref 8.6–10)
CHLORIDE SERPL-SCNC: 102 MMOL/L (ref 98–107)
COLOR UR AUTO: YELLOW
CREAT SERPL-MCNC: 0.82 MG/DL (ref 0.51–0.95)
CRP SERPL-MCNC: 12.8 MG/L
DEPRECATED HCO3 PLAS-SCNC: 19 MMOL/L (ref 22–29)
ERYTHROCYTE [DISTWIDTH] IN BLOOD BY AUTOMATED COUNT: 15.6 % (ref 10–15)
GFR SERPL CREATININE-BSD FRML MDRD: >90 ML/MIN/1.73M2
GLUCOSE SERPL-MCNC: 90 MG/DL (ref 70–99)
GLUCOSE UR STRIP-MCNC: NEGATIVE MG/DL
HBA1C MFR BLD: 5.8 % (ref 0–5.6)
HCT VFR BLD AUTO: 32.9 % (ref 35–47)
HGB BLD-MCNC: 10.3 G/DL (ref 11.7–15.7)
HGB UR QL STRIP: ABNORMAL
KETONES UR STRIP-MCNC: NEGATIVE MG/DL
LEUKOCYTE ESTERASE UR QL STRIP: NEGATIVE
MCH RBC QN AUTO: 24.3 PG (ref 26.5–33)
MCHC RBC AUTO-ENTMCNC: 31.3 G/DL (ref 31.5–36.5)
MCV RBC AUTO: 78 FL (ref 78–100)
NITRATE UR QL: NEGATIVE
PH UR STRIP: 7 [PH] (ref 5–8)
PLATELET # BLD AUTO: 314 10E3/UL (ref 150–450)
POTASSIUM SERPL-SCNC: 4.5 MMOL/L (ref 3.4–5.3)
PROT SERPL-MCNC: 7.5 G/DL (ref 6.4–8.3)
RBC # BLD AUTO: 4.24 10E6/UL (ref 3.8–5.2)
RBC #/AREA URNS AUTO: ABNORMAL /HPF
SODIUM SERPL-SCNC: 136 MMOL/L (ref 136–145)
SP GR UR STRIP: 1.01 (ref 1–1.03)
SQUAMOUS #/AREA URNS AUTO: ABNORMAL /LPF
UROBILINOGEN UR STRIP-ACNC: 0.2 E.U./DL
WBC # BLD AUTO: 6.6 10E3/UL (ref 4–11)
WBC #/AREA URNS AUTO: ABNORMAL /HPF

## 2022-11-23 PROCEDURE — 0124A COVID-19 VACCINE BIVALENT BOOSTER 12+ (PFIZER): CPT | Performed by: FAMILY MEDICINE

## 2022-11-23 PROCEDURE — 36415 COLL VENOUS BLD VENIPUNCTURE: CPT | Performed by: FAMILY MEDICINE

## 2022-11-23 PROCEDURE — 81001 URINALYSIS AUTO W/SCOPE: CPT | Performed by: FAMILY MEDICINE

## 2022-11-23 PROCEDURE — 87340 HEPATITIS B SURFACE AG IA: CPT | Performed by: FAMILY MEDICINE

## 2022-11-23 PROCEDURE — 86706 HEP B SURFACE ANTIBODY: CPT | Performed by: FAMILY MEDICINE

## 2022-11-23 PROCEDURE — 85027 COMPLETE CBC AUTOMATED: CPT | Performed by: FAMILY MEDICINE

## 2022-11-23 PROCEDURE — 86140 C-REACTIVE PROTEIN: CPT | Performed by: FAMILY MEDICINE

## 2022-11-23 PROCEDURE — 90686 IIV4 VACC NO PRSV 0.5 ML IM: CPT | Performed by: FAMILY MEDICINE

## 2022-11-23 PROCEDURE — 80053 COMPREHEN METABOLIC PANEL: CPT | Performed by: FAMILY MEDICINE

## 2022-11-23 PROCEDURE — 91312 COVID-19 VACCINE BIVALENT BOOSTER 12+ (PFIZER): CPT | Performed by: FAMILY MEDICINE

## 2022-11-23 PROCEDURE — 90471 IMMUNIZATION ADMIN: CPT | Performed by: FAMILY MEDICINE

## 2022-11-23 PROCEDURE — 99396 PREV VISIT EST AGE 40-64: CPT | Mod: 25 | Performed by: FAMILY MEDICINE

## 2022-11-23 PROCEDURE — 83036 HEMOGLOBIN GLYCOSYLATED A1C: CPT | Performed by: FAMILY MEDICINE

## 2022-11-23 ASSESSMENT — ENCOUNTER SYMPTOMS
ABDOMINAL PAIN: 0
HEARTBURN: 0
CHILLS: 0
COUGH: 0
NERVOUS/ANXIOUS: 1
NAUSEA: 0
SORE THROAT: 0
DIARRHEA: 0
FEVER: 0
FREQUENCY: 1
DYSURIA: 1
HEADACHES: 1
SHORTNESS OF BREATH: 0
DIZZINESS: 0
JOINT SWELLING: 0
PARESTHESIAS: 0
WEAKNESS: 0
ARTHRALGIAS: 0
EYE PAIN: 0
CONSTIPATION: 0
HEMATOCHEZIA: 1
HEMATURIA: 0
PALPITATIONS: 0
MYALGIAS: 0

## 2022-11-23 NOTE — PATIENT INSTRUCTIONS
Preventive Health Recommendations  Female Ages 40 to 49    Yearly exam:   See your health care provider every year in order to  Review health changes.   Discuss preventive care.    Review your medicines if your doctor prescribed any.    Get a Pap test every three years (unless you have an abnormal result and your provider advises testing more often).    If you get Pap tests with HPV test, you only need to test every 5 years, unless you have an abnormal result. You do not need a Pap test if your uterus was removed (hysterectomy) and you have not had cancer.    You should be tested each year for STDs (sexually transmitted diseases), if you're at risk.   Ask your doctor if you should have a mammogram.    Have a colonoscopy (test for colon cancer) if someone in your family has had colon cancer or polyps before age 50.     Have a cholesterol test every 5 years.     Have a diabetes test (fasting glucose) after age 45. If you are at risk for diabetes, you should have this test every 3 years.    Shots: Get a flu shot each year. Get a tetanus shot every 10 years.   Work on getting your bowel movements super soft  Fruit/veggies- prunes, etc  Robinson and ground flax  Metamucil   Benefiber   Miralax     Nutrition:   Eat at least 5 servings of fruits and vegetables each day.  Eat whole-grain bread, whole-wheat pasta and brown rice instead of white grains and rice.  Get adequate Calcium and Vitamin D.      Lifestyle  Exercise at least 150 minutes a week (an average of 30 minutes a day, 5 days a week). This will help you control your weight and prevent disease.  Limit alcohol to one drink per day.  No smoking.   Wear sunscreen to prevent skin cancer.  See your dentist every six months for an exam and cleaning.

## 2022-11-23 NOTE — PROGRESS NOTES
Ongoing blood stools since last visit last year. 1-2 times a month and very large amount of blood in the toilet  11/2 and 11/4 this month.  Only happens with BM and notes a large amount of blood mixed with stool.  Usually feels like a very large BM to start with but comes out easy without straining. Blood does also drip from rectum.  No pain with BM.  No dizziness, etc.  All BMs since then are normal.  No GI symptoms prior to warn her that this will happen.      Did get hep B vaccines as a child.     Diabetes level check

## 2022-11-23 NOTE — PROGRESS NOTES
SUBJECTIVE:   CC: Anish is an 42 year old who presents for preventive health visit.   [facility] Maintenance Exam  [facility]  Phone : None    Assessment/Plan     1. Annual Wellness Visit as outlined below.   Health maintenance discussed as appropriate for age and risk factors including:  Nutrition, exercise, alcohol use, tobacco use, safe sexual practices, dental health.  Cancer screening assessed and ordered as indicated. Routine labs as outlined below based on age and risk factors reviewed today. Immunizations reviewed and updated.       Routine general medical examination at a health care facility  - REVIEW OF HEALTH MAINTENANCE PROTOCOL ORDERS  - INFLUENZA VACCINE IM > 6 MONTHS VALENT IIV4 (AFLURIA/FLUZONE)  - COVID-19,PF,PFIZER BOOSTER BIVALENT (12+YRS)  - Hepatitis B surface antigen  - Hepatitis B Surface Antibody  - Hemoglobin A1c  - UA with Microscopic reflex to Culture - Clinic Collect  - Comprehensive metabolic panel (BMP + Alb, Alk Phos, ALT, AST, Total. Bili, TP)  - CBC with platelets    Prediabetes  Reviewed LSM   Checking a1c today     Morbid obesity (H)  Reviewed LSM    Hypothyroidism due to Hashimoto's thyroiditis  Working with endocrine- stable labs and asymptomatic     Bloody stools  Likely related to bleeding internal hemorrhoids.  Reviewed how to keep stools soft for next 2-3 months and if ongoing or worsening symptoms then send a [employer] message and consider CRS referral.   - CRP, inflammation       Return in about 1 year (around 11/23/2023) for Health Maintenance Visit.    HPI:     Chief Complaint   Patient presents with     Physical     Rectal Problem       Anish Hauser is a 42 year old female and is here for a comprehensive health maintenance exam.     Other concerns today:   Ongoing blood stools since last visit last year. 1-2 times a month and very large amount of blood in the toilet  11/2 and 11/4 this month.  Only happens with BM and notes a large amount of  blood mixed with stool.  Usually feels like a very large BM to start with but comes out easy without straining. Blood does also drip from rectum.  No pain with BM.  No dizziness, etc.  All BMs since then are normal.  No GI symptoms prior to warn her that this will happen.       Did get hep B vaccines as a child.      Diabetes level check     Review of Systems   Complete ROS including General, HEENT, CV, Pulmonary, GI, , Genital, Neuro, Psych, MSK, Heme, Endocrine all within normal except as noted in the HPI or below as documented by patient.       Cancer Screening:  Hemoccults/Colonoscopy: na  Mammogram:  na  Pap Smear:  Due 2025  Lung Cancer: na      Wt Readings from Last 3 Encounters:   11/23/22 89.4 kg (197 lb)   08/17/22 87.5 kg (193 lb)   01/25/22 83 kg (183 lb)         Complete physical exam including:  General, HEENT, Neck, breast, back, CV, Pulmonary, Abdominal, extremities, skin, neuro, psych, lymph, genital exams all within normal.    Abnormalities include:  Grossly normal exam.  May have  Rectal fissure with skin tag at 12oclock and small external hemorrhoid at 7 oclock.  Normal RAJI.      Patient has been advised of split billing requirements and indicates understanding: Yes  Healthy Habits:     Getting at least 3 servings of Calcium per day:  Yes    Bi-annual eye exam:  Yes    Dental care twice a year:  Yes    Sleep apnea or symptoms of sleep apnea:  Sleep apnea    Diet:  Regular (no restrictions)    Frequency of exercise:  2-3 days/week    Duration of exercise:  15-30 minutes    Taking medications regularly:  Yes    Medication side effects:  None    PHQ-2 Total Score: 0    Additional concerns today:  Yes      Today's PHQ-2 Score:   PHQ-2 ( 1999 Pfizer) 11/23/2022   Q1: Little interest or pleasure in doing things 0   Q2: Feeling down, depressed or hopeless 0   PHQ-2 Score 0   PHQ-2 Total Score (12-17 Years)- Positive if 3 or more points; Administer PHQ-A if positive -   Q1: Little interest or pleasure  in doing things Not at all   Q2: Feeling down, depressed or hopeless Not at all   PHQ-2 Score 0       Social History     Tobacco Use     Smoking status: Former     Types: Cigarettes     Quit date:      Years since quittin.9     Smokeless tobacco: Former     Quit date: 2014     Tobacco comments:     No exposure to second hand smoking.   Substance Use Topics     Alcohol use: No       Alcohol Use 2022   Prescreen: >3 drinks/day or >7 drinks/week? No     Reviewed orders with patient.  Reviewed health maintenance and updated orders accordingly - Yes    Breast Cancer Screening:    Breast CA Risk Assessment (FHS-7) 2022   Do you have a family history of breast, colon, or ovarian cancer? No / Unknown       Patient under 40 years of age: Routine Mammogram Screening not recommended.   Pertinent mammograms are reviewed under the imaging tab.    History of abnormal Pap smear: NO - age 30-65 PAP every 5 years with negative HPV co-testing recommended  PAP / HPV Latest Ref Rng & Units 2020 3/27/2015   PAP Negative for squamous intraepithelial lesion or malignancy. Negative for squamous intraepithelial lesion or malignancy  Electronically signed by Vanessa Campbell CT (ASCP) on 3/2/2020 at 10:33 AM   Negative for squamous intraepithelial lesion or malignancy  Electronically signed by Gabbie Mckeon CT (ASCP) on 2015 at  2:15 PM     HPV16 NEG Negative -   HPV18 NEG Negative -   HRHPV NEG Negative -     Reviewed and updated as needed this visit by clinical staff   Tobacco  Allergies  Meds  Problems  Med Hx  Surg Hx  Fam Hx          Reviewed and updated as needed this visit by Provider   Tobacco  Allergies  Meds  Problems  Med Hx  Surg Hx  Fam Hx         Past Medical History:   Diagnosis Date     Head injury, closed, sequela 2018     Heartburn      History of angina      History of blood transfusion      Motion sickness      Seasonal allergies      Thyroid disease      "  Past Surgical History:   Procedure Laterality Date     BREAST CYST INCISION AND DRAINAGE Left     sebacous cyst, Dr. Sidhu     LAPAROSCOPIC CHOLECYSTECTOMY N/A 2022    Procedure: CHOLECYSTECTOMY, LAPAROSCOPIC;  Surgeon: Julissa Mcmullen DO;  Location: [facility]     OB History    Para Term  AB Living   4 0 0 0 0 0   SAB IAB Ectopic Multiple Live Births   0 0 0 0 0      # Outcome Date GA Lbr Deandre/2nd Weight Sex Delivery Anes PTL Lv   4             3             2             1                 Review of Systems   Constitutional: Negative for chills and fever.   HENT: Negative for congestion, ear pain, hearing loss and sore throat.    Eyes: Negative for pain and visual disturbance.   Respiratory: Negative for cough and shortness of breath.    Cardiovascular: Positive for chest pain. Negative for palpitations and peripheral edema.   Gastrointestinal: Positive for hematochezia. Negative for abdominal pain, constipation, diarrhea, heartburn and nausea.   Genitourinary: Positive for dysuria and frequency. Negative for genital sores, hematuria and urgency.   Musculoskeletal: Negative for arthralgias, joint swelling and myalgias.   Skin: Negative for rash.   Neurological: Positive for headaches. Negative for dizziness, weakness and paresthesias.   Psychiatric/Behavioral: Negative for mood changes. The patient is nervous/anxious.         OBJECTIVE:   /87 (BP Location: Right arm, Patient Position: Sitting, Cuff Size: Adult Large)   Pulse 80   Resp 20   Ht 1.5 m (4' 11.06\")   Wt 89.4 kg (197 lb)   LMP  (LMP Unknown)   SpO2 99%   BMI 39.72 kg/m    Physical Exam    ASSESSMENT/PLAN:     COUNSELING:    BMI:   Estimated body mass index is 39.72 kg/m  as calculated from the following:    Height as of this encounter: 1.5 m (4' 11.06\").    Weight as of this encounter: 89.4 kg (197 lb).   Weight management plan: Discussed healthy diet and exercise guidelines      She " reports that she quit smoking about 13 years ago. She quit smokeless tobacco use about 8 years ago.          Bibi Colunga MD  [facility]

## 2022-11-25 DIAGNOSIS — R79.82 ELEVATED C-REACTIVE PROTEIN (CRP): ICD-10-CM

## 2022-11-25 DIAGNOSIS — K92.1 BLOODY STOOLS: Primary | ICD-10-CM

## 2022-11-25 PROBLEM — D50.0 IRON DEFICIENCY ANEMIA DUE TO CHRONIC BLOOD LOSS: Status: ACTIVE | Noted: 2022-11-25

## 2022-11-25 LAB
HBV SURFACE AB SERPL IA-ACNC: 105.33 M[IU]/ML
HBV SURFACE AB SERPL IA-ACNC: REACTIVE M[IU]/ML
HBV SURFACE AG SERPL QL IA: NONREACTIVE

## 2022-12-08 ENCOUNTER — APPOINTMENT (OUTPATIENT)
Dept: FAMILY MEDICINE | Facility: CLINIC | Age: 42
End: 2022-12-08
Payer: COMMERCIAL

## 2023-01-17 ENCOUNTER — TELEPHONE (OUTPATIENT)
Dept: GASTROENTEROLOGY | Facility: CLINIC | Age: 43
End: 2023-01-17
Payer: COMMERCIAL

## 2023-01-17 NOTE — TELEPHONE ENCOUNTER
"    Screening Questions  BLUE  KIND OF PREP RED  LOCATION [review exclusion criteria] GREEN  SEDATION TYPE        y Are you active on mychart?       Bibi Colunga MD   Ordering/Referring Provider?        Preferred one What type of coverage do you have?      n Have you had a positive covid test in the last 14 days?     37.1 1. BMI  [BMI 40+ - review exclusion criteria]    y  2. Are you able to give consent for your medical care? [IF NO,RN REVIEW]          n  3. Are you taking any prescription pain medications on a routine schedule   (ex narcotics: tramadol, oxycodone, roxicodone, oxycontin,  and percocet)?          3a. EXTENDED PREP What kind of prescription?     n 4. Do you have any chemical dependencies such as alcohol, street drugs, or methadone?        **If yes 3- 5 , please schedule with MAC sedation.**          IF YES TO ANY 6 - 10 - HOSPITAL SETTING ONLY.     n 6.   Do you need assistance transferring?     n 7.   Have you had a heart or lung transplant?    n 8.   Are you currently on dialysis?   n 9.   Do you use daily home oxygen?   n 10. Do you take nitroglycerin?   10a.  If yes, how often?     11. [FEMALES]  n Are you currently pregnant?    11a.  If yes, how many weeks? [ Greater than 12 weeks, OR NEEDED]    n 12. Do you have Pulmonary Hypertension? *NEED PAC APPT AT [facility]*     n 13. [review exclusion criteria]  Do you have any implantable devices in your body (pacemaker, defib, LVAD)?    n 14. In the past 6 months, have you had any heart related issues including cardiomyopathy or heart attack?     14a.  If yes, did it require cardiac stenting if so when?     n 15. Have you had a stroke or Transient ischemic attack (TIA - aka  mini stroke ) within 6 months?      n 16. Do you have mod to severe Obstructive Sleep Apnea?  [Hospital only]    n 17. Do you have SEVERE AND UNCONTROLLED asthma? *NEED PAC APPT AT [facility]*     n 18. Are you currently taking any blood thinners?     18a. If yes, inform patient to \"follow " "up w/ ordering provider for bridging instructions.\"    n 19. Do you take the medication Phentermine?    19a. If yes, \"Hold for 7 days before procedure.  Please consult your prescribing provider if you have questions about holding this medication.\"     n  20. Do you have chronic kidney disease?      n  21. Do you have a diagnosis of diabetes?     n  22. On a regular basis do you go 3-5 days between bowel movements?      23. Preferred LOCAL Pharmacy for Pre Prescription    [ LIST ONLY ONE PHARMACY]          [id number] - [place], [place] - [address]          - CLOSING REMINDERS -    Informed patient they will need an adult    Cannot take any type of public or medical transportation alone    Conscious Sedation- Needs  for 6 hours after the procedure       MAC/General-Needs  for 24 hours after procedure    Pre-Procedure Covid test to be completed [[facility] PCR Testing Required]    Confirmed Nurse will call to complete assessment       - SCHEDULING DETAILS -  n Hospital Setting Required? If yes, what is the exclusion?:    long  Surgeon    02/24/2023  Date of Procedure  Lower Endoscopy [Colonoscopy]  Type of Procedure Scheduled  [facility]   STANDARD [facility]-If you answer yes to questions #8, #20, #21Which Colonoscopy Prep was Sent?     CS Sedation Type     n PAC / Pre-op Required               "

## 2023-02-10 ENCOUNTER — LAB (OUTPATIENT)
Dept: LAB | Facility: CLINIC | Age: 43
End: 2023-02-10
Payer: COMMERCIAL

## 2023-02-10 ENCOUNTER — TELEPHONE (OUTPATIENT)
Dept: GASTROENTEROLOGY | Facility: CLINIC | Age: 43
End: 2023-02-10
Payer: COMMERCIAL

## 2023-02-10 DIAGNOSIS — E06.3 HYPOTHYROIDISM DUE TO HASHIMOTO'S THYROIDITIS: ICD-10-CM

## 2023-02-10 DIAGNOSIS — K92.1 BLOODY STOOLS: Primary | ICD-10-CM

## 2023-02-10 LAB
T4 FREE SERPL-MCNC: 1 NG/DL (ref 0.9–1.7)
TSH SERPL DL<=0.005 MIU/L-ACNC: 0.98 UIU/ML (ref 0.3–4.2)

## 2023-02-10 PROCEDURE — 84443 ASSAY THYROID STIM HORMONE: CPT

## 2023-02-10 PROCEDURE — 36415 COLL VENOUS BLD VENIPUNCTURE: CPT

## 2023-02-10 PROCEDURE — 84439 ASSAY OF FREE THYROXINE: CPT

## 2023-02-10 RX ORDER — BISACODYL 5 MG/1
TABLET, DELAYED RELEASE ORAL
Qty: 4 TABLET | Refills: 0 | Status: SHIPPED | OUTPATIENT
Start: 2023-02-10 | End: 2023-08-29

## 2023-02-10 NOTE — TELEPHONE ENCOUNTER
Pre assessment questions completed for upcoming Colonoscopy  procedure scheduled on 2.24.2023    COVID policy reviewed.     Pre-op scheduled  N/A    Reviewed procedural arrival time 0645, procedure time 0730 and facility location [facility]; [address] [place] [address]    Designated  policy reviewed. Instructed to have someone stay 6 hours post procedure.     Anticoagulation/blood thinners? No    Electronic implanted devices? No    Diabetic? No    Procedure indication: bloody stools     Bowel prep recommendation: Standard Golytely d/t mg citrate recall    Reviewed procedure prep instructions.     Prep instructions sent via [employer].  Bowel prep script sent to    [facility] #[id number] - [place], [place] - [address] AT [address]    Patient verbalized understanding and had no questions or concerns at this time.    Maira Arrington RN  Endoscopy Procedure Pre Assessment RN

## 2023-02-14 ENCOUNTER — VIRTUAL VISIT (OUTPATIENT)
Dept: ENDOCRINOLOGY | Facility: CLINIC | Age: 43
End: 2023-02-14
Payer: COMMERCIAL

## 2023-02-14 DIAGNOSIS — E06.3 HYPOTHYROIDISM DUE TO HASHIMOTO'S THYROIDITIS: Primary | ICD-10-CM

## 2023-02-14 PROCEDURE — 99213 OFFICE O/P EST LOW 20 MIN: CPT | Mod: VID | Performed by: NURSE PRACTITIONER

## 2023-02-14 NOTE — PROGRESS NOTES
[facility] ENDOCRINOLOGY    Thyroid Note  2/15/2023    Anish Hauser, 1980, [id number]          Reason for visit      1. Hypothyroidism due to Hashimoto's thyroiditis        HPI     Anish Hauser is a very pleasant 43 year old old female who presents for follow up.  SUMMARY:    Anish is contacted via Video Visit in f/u for Hypothyroidism. She reports today that she is feeling well. Her current TSH is 0.98 and fT4 is 1.00.  She is taking 75 mcg of Levothyroxine daily on an empty stomach. She is having no problems referable to her neck.     Past Medical History     Patient Active Problem List   Diagnosis     Adjustment disorder with mixed anxiety and depressed mood     Morbid obesity (H)     Hypothyroidism due to Hashimoto's thyroiditis     Hidradenitis     Prediabetes     Bloody stools     Iron deficiency anemia due to chronic blood loss       Family History       family history includes Diabetes in her mother; Hepatitis in her maternal grandmother; Hyperlipidemia in her father and mother; Hypertension in her maternal grandmother and mother; IgA nephropathy in her brother and daughter; Liver Disease in her brother; No Known Problems in her sister, sister, sister, sister, and son; Uterine Cancer in her mother.    Social History      reports that she quit smoking about 14 years ago. She quit smokeless tobacco use about 8 years ago. She reports that she does not drink alcohol and does not use drugs.      Review of Systems     Patient denies fatigue, weight changes, heat/cold intolerance, bowel/skin changes or CVS symptoms.   Remainder per HPI and per attached intake form.      Vital Signs     There were no vitals taken for this visit.  Wt Readings from Last 3 Encounters:   11/23/22 89.4 kg (197 lb)   08/17/22 87.5 kg (193 lb)   01/25/22 83 kg (183 lb)       Physical Exam     Constitutional:  Well developed, Well nourished  HENT:  Normocephalic,   Neck: normal in appearance  Eyes:  PERRL, Conjunctiva  pink  Respiratory:  No respiratory distress  Skin: No acanthosis nigricans, lipoatrophy or lipodystrophy  Neurologic:  Alert & oriented x 3, nonfocal  Psychiatric:  Affect, Mood, Insight appropriate          Assessment     1. Hypothyroidism due to Hashimoto's thyroiditis            Plan     Pt is bio-chemically and physically euthyroid. She will remain on her current dose of medication and will f/u with me in 1 year, sooner with problems or concerns.         Kathy Brown NP  [facility]  2/15/2023  6:24 AM      Lab Results     TSH   Date Value Ref Range Status   02/10/2023 0.98 0.30 - 4.20 uIU/mL Final   02/11/2022 2.09 0.30 - 5.00 uIU/mL Final     No components found for: THYROIDAB    No results found for: O2LPIUU    Imaging Results   Last thyroid ultrasound:  No results found for this or any previous visit.      Last thyroid nuclear scan:  No results found for this or any previous visit.      Current Medications     Outpatient Medications Prior to Visit   Medication Sig Dispense Refill     bisacodyl (DULCOLAX) 5 MG EC tablet Take 2 tablets at 3 pm the day before your procedure. If your procedure is before 11 am, take 2 additional tablets at 11 pm. If your procedure is after 11 am, take 2 additional tablets at 6 am. For additional instructions refer to your colonoscopy prep instructions. 4 tablet 0     clindamycin (CLEOCIN T) 1 % external lotion Apply topically 2 times daily 60 mL 11     hydrOXYzine (ATARAX) 25 MG tablet TAKE 1 TABLET(25 MG) BY MOUTH AT BEDTIME AS NEEDED FOR ITCHING 90 tablet 2     levothyroxine (SYNTHROID/LEVOTHROID) 75 MCG tablet Take 1 tablet (75 mcg) by mouth daily 90 tablet 3     metoprolol succinate ER (TOPROL-XL) 25 MG 24 hr tablet TAKE 1 TABLET(25 MG) BY MOUTH DAILY 30 tablet 3     polyethylene glycol (GOLYTELY) 236 g suspension The night before the exam at 6 pm drink an 8-ounce glass every 15 minutes until the jug is half empty. If you arrive before 11 AM: Drink the other half of  the Golytely jug at 11 PM night before procedure. If you arrive after 11 AM: Drink the other half of the Golytely jug at 6 AM day of procedure. For additional instructions refer to your colonoscopy prep instructions. 4000 mL 0     No facility-administered medications prior to visit.           Video-Visit Details    Type of service:  Video Visit    Video Start Time (time video started): 1400    Video End Time (time video stopped): 1420    Originating Location (pt. Location): Home        Distant Location (provider location):  On-site    Mode of Communication:  Video Conference via [employer]          Date of last OV: 8/16/22  Reason for Visit: Hypothyroidism

## 2023-02-24 ENCOUNTER — HOSPITAL ENCOUNTER (OUTPATIENT)
Facility: CLINIC | Age: 43
Discharge: HOME | End: 2023-02-24
Attending: INTERNAL MEDICINE | Admitting: INTERNAL MEDICINE
Payer: COMMERCIAL

## 2023-02-24 VITALS
DIASTOLIC BLOOD PRESSURE: 98 MMHG | BODY MASS INDEX: 38.3 KG/M2 | HEART RATE: 77 BPM | WEIGHT: 190 LBS | HEIGHT: 59 IN | RESPIRATION RATE: 14 BRPM | OXYGEN SATURATION: 97 % | SYSTOLIC BLOOD PRESSURE: 126 MMHG

## 2023-02-24 LAB — COLONOSCOPY: NORMAL

## 2023-02-24 PROCEDURE — G0500 MOD SEDAT ENDO SERVICE >5YRS: HCPCS | Performed by: INTERNAL MEDICINE

## 2023-02-24 PROCEDURE — 45378 DIAGNOSTIC COLONOSCOPY: CPT | Performed by: INTERNAL MEDICINE

## 2023-02-24 PROCEDURE — 250N000011 HC RX IP 250 OP 636: Performed by: INTERNAL MEDICINE

## 2023-02-24 RX ORDER — FENTANYL CITRATE 50 UG/ML
INJECTION, SOLUTION INTRAMUSCULAR; INTRAVENOUS PRN
Status: DISCONTINUED | OUTPATIENT
Start: 2023-02-24 | End: 2023-02-24 | Stop reason: HOSPADM

## 2023-02-24 ASSESSMENT — ACTIVITIES OF DAILY LIVING (ADL): ADLS_ACUITY_SCORE: 35

## 2023-02-24 NOTE — H&P
[facility] Anesthesia Pre-op History and Physical    Anish Hauser MRN# [id number]   Age: 43 year old YOB: 1980      Date of Surgery: 2/24/2023 Location [facility]      Date of Exam 2/24/2023 Facility (Same day)       Home clinic: [facility]  Primary care provider: Bibi Colunga         Chief Complaint and/or Reason for Procedure:   No chief complaint on file.    Colonoscopy.  BRBPR       Active problem list:     Patient Active Problem List    Diagnosis Date Noted     Iron deficiency anemia due to chronic blood loss 11/25/2022     Priority: Medium     Bloody stools 11/23/2022     Priority: Medium     Prediabetes 10/20/2021     Priority: Medium     Hypothyroidism due to Hashimoto's thyroiditis 10/14/2021     Priority: Medium     Hidradenitis 10/14/2021     Priority: Medium     Adjustment disorder with mixed anxiety and depressed mood 08/17/2020     Priority: Medium     Morbid obesity (H) 08/17/2020     Priority: Medium            Medications (include herbals and vitamins):   Any Plavix use in the last 7 days? No     No current facility-administered medications for this encounter.     Current Outpatient Medications   Medication Sig     bisacodyl (DULCOLAX) 5 MG EC tablet Take 2 tablets at 3 pm the day before your procedure. If your procedure is before 11 am, take 2 additional tablets at 11 pm. If your procedure is after 11 am, take 2 additional tablets at 6 am. For additional instructions refer to your colonoscopy prep instructions.     clindamycin (CLEOCIN T) 1 % external lotion Apply topically 2 times daily     hydrOXYzine (ATARAX) 25 MG tablet TAKE 1 TABLET(25 MG) BY MOUTH AT BEDTIME AS NEEDED FOR ITCHING     levothyroxine (SYNTHROID/LEVOTHROID) 75 MCG tablet Take 1 tablet (75 mcg) by mouth daily     metoprolol succinate ER (TOPROL-XL) 25 MG 24 hr tablet TAKE 1 TABLET(25 MG) BY MOUTH DAILY     polyethylene glycol (GOLYTELY) 236 g suspension The night before  the exam at 6 pm drink an 8-ounce glass every 15 minutes until the jug is half empty. If you arrive before 11 AM: Drink the other half of the Golytely jug at 11 PM night before procedure. If you arrive after 11 AM: Drink the other half of the Golytely jug at 6 AM day of procedure. For additional instructions refer to your colonoscopy prep instructions.             Allergies:      Allergies   Allergen Reactions     Dust Mite Extract Hives     Grass Extracts [Gramineae Pollens] Hives     Shellfish Allergy Hives     Allergy to Latex? No  Allergy to tape?   No  Intolerances:             Physical Exam:   All vitals have been reviewed  No data found.  No intake/output data recorded.  Lungs:   No increased work of breathing, good air exchange, clear to auscultation bilaterally, no crackles or wheezing and good air exchange     Cardiovascular:   Normal apical impulse, regular rate and rhythm, normal S1 and S2, no S3 or S4, and no murmur noted             Lab / Radiology Results:            Anesthetic risk and/or ASA classification:       Montana Tadeo MD

## 2023-04-25 ENCOUNTER — OFFICE VISIT (OUTPATIENT)
Dept: FAMILY MEDICINE | Facility: CLINIC | Age: 43
End: 2023-04-25
Payer: COMMERCIAL

## 2023-04-25 VITALS
OXYGEN SATURATION: 98 % | TEMPERATURE: 97.7 F | BODY MASS INDEX: 39.54 KG/M2 | WEIGHT: 195.75 LBS | DIASTOLIC BLOOD PRESSURE: 85 MMHG | HEART RATE: 67 BPM | SYSTOLIC BLOOD PRESSURE: 124 MMHG | RESPIRATION RATE: 20 BRPM

## 2023-04-25 DIAGNOSIS — G44.209 TENSION HEADACHE: ICD-10-CM

## 2023-04-25 DIAGNOSIS — R03.0 ELEVATED BLOOD-PRESSURE READING, WITHOUT DIAGNOSIS OF HYPERTENSION: Primary | ICD-10-CM

## 2023-04-25 DIAGNOSIS — E66.01 MORBID OBESITY (H): ICD-10-CM

## 2023-04-25 PROCEDURE — 0124A COVID-19 VACCINE BIVALENT BOOSTER 12+ (PFIZER): CPT | Performed by: FAMILY MEDICINE

## 2023-04-25 PROCEDURE — 99214 OFFICE O/P EST MOD 30 MIN: CPT | Mod: 25 | Performed by: FAMILY MEDICINE

## 2023-04-25 PROCEDURE — 91312 COVID-19 VACCINE BIVALENT BOOSTER 12+ (PFIZER): CPT | Performed by: FAMILY MEDICINE

## 2023-04-25 ASSESSMENT — ENCOUNTER SYMPTOMS: HEADACHES: 1

## 2023-04-25 NOTE — PROGRESS NOTES
Assessment & Plan     Elevated blood-pressure reading, without diagnosis of hypertension  Patient brought her BP monitor, we did check her BP manually and with her monitor, looks like her BP monitor was significantly out of range showing a BP of 133/101 compared to 120/80 on a recheck by me.  She will try to get a new BP cough or a new BP machine.    Tension headache  Seems to be responding well to symptomatic care including rest, Tylenol as needed.    Morbid obesity (H)  Discussed weight loss, healthy lifestyle changes.      Review of external notes as documented elsewhere in note  30 minutes spent by me on the date of the encounter doing chart review, review of outside records, review of test results, interpretation of tests, patient visit and documentation        Work on weight loss  Regular exercise    Margarita Olivarez MD  [facility]    Loida Oconnell is a 43 year old, presenting for the following health issues:  Blood Pressure Check (Patient states that her bp has been high lately. ) and Headache (Patient states that the headache come and go for a month now. States that it has been happen frequently lately.)        4/25/2023     3:27 PM   Additional Questions   Roomed by Brennen SCHILLING   Accompanied by self     Headache     History of Present Illness       Reason for visit:  High blood pressure bottom number  Symptom onset:  More than a month  Symptom intensity:  Severe  Symptom progression:  Worsening  Had these symptoms before:  No  What makes it worse:  No  What makes it better:  No    She eats 2-3 servings of fruits and vegetables daily.She consumes 0 sweetened beverage(s) daily.She exercises with enough effort to increase her heart rate 20 to 29 minutes per day.  She exercises with enough effort to increase her heart rate 3 or less days per week.   She is taking medications regularly.       Blood pressure mildly elevated at home, with diastolics sometime between 101-120, systolic seems  to be normal.  Occasional headaches, treated with symptomatic care, currently taking metoprolol as needed for anxiety.      Review of Systems   Neurological: Positive for headaches.      Constitutional, HEENT, cardiovascular, pulmonary, gi and gu systems are negative, except as otherwise noted.      Objective    /85 (BP Location: Left arm, Patient Position: Sitting, Cuff Size: Adult Large)   Pulse 67   Temp 97.7  F (36.5  C) (Temporal)   Resp 20   Wt 88.8 kg (195 lb 12 oz)   LMP 04/23/2023 (Approximate)   SpO2 98%   BMI 39.54 kg/m    Body mass index is 39.54 kg/m .  Physical Exam   GENERAL: healthy, alert and no distress  NECK: no adenopathy, no asymmetry, masses, or scars and thyroid normal to palpation  RESP: lungs clear to auscultation - no rales, rhonchi or wheezes  CV: regular rate and rhythm, normal S1 S2, no S3 or S4, no murmur, click or rub, no peripheral edema and peripheral pulses strong  ABDOMEN: soft, nontender, no hepatosplenomegaly, no masses and bowel sounds normal  MS: no gross musculoskeletal defects noted, no edema    No results found for any visits on 04/25/23.    This note was dictated using a voice recognition software.  Any grammatical or context distortion are unintentional and inherent to the software.

## 2023-08-18 ENCOUNTER — LAB (OUTPATIENT)
Dept: LAB | Facility: CLINIC | Age: 43
End: 2023-08-18
Payer: COMMERCIAL

## 2023-08-18 DIAGNOSIS — E06.3 HYPOTHYROIDISM DUE TO HASHIMOTO'S THYROIDITIS: ICD-10-CM

## 2023-08-18 LAB
CREAT SERPL-MCNC: 1.16 MG/DL (ref 0.51–0.95)
GFR SERPL CREATININE-BSD FRML MDRD: 60 ML/MIN/1.73M2
T4 FREE SERPL-MCNC: 1.07 NG/DL (ref 0.9–1.7)
TSH SERPL DL<=0.005 MIU/L-ACNC: 1.12 UIU/ML (ref 0.3–4.2)

## 2023-08-18 PROCEDURE — 82306 VITAMIN D 25 HYDROXY: CPT

## 2023-08-18 PROCEDURE — 84439 ASSAY OF FREE THYROXINE: CPT

## 2023-08-18 PROCEDURE — 36415 COLL VENOUS BLD VENIPUNCTURE: CPT

## 2023-08-18 PROCEDURE — 84443 ASSAY THYROID STIM HORMONE: CPT

## 2023-08-18 PROCEDURE — 82565 ASSAY OF CREATININE: CPT

## 2023-08-19 LAB — DEPRECATED CALCIDIOL+CALCIFEROL SERPL-MC: 31 UG/L (ref 20–75)

## 2023-08-29 ENCOUNTER — VIRTUAL VISIT (OUTPATIENT)
Dept: ENDOCRINOLOGY | Facility: CLINIC | Age: 43
End: 2023-08-29
Payer: COMMERCIAL

## 2023-08-29 DIAGNOSIS — R79.89 ELEVATED SERUM CREATININE: ICD-10-CM

## 2023-08-29 DIAGNOSIS — E06.3 HYPOTHYROIDISM DUE TO HASHIMOTO'S THYROIDITIS: Primary | ICD-10-CM

## 2023-08-29 PROCEDURE — 99214 OFFICE O/P EST MOD 30 MIN: CPT | Mod: VID | Performed by: NURSE PRACTITIONER

## 2023-08-29 NOTE — PROGRESS NOTES
[facility] ENDOCRINOLOGY    Thyroid Note  8/29/2023    Anish Hauser, 1980, [id number]          Reason for visit      1. Hypothyroidism due to Hashimoto's thyroiditis    2. Elevated serum creatinine        HPI     Anish Hauser is a very pleasant 43 year old old female who presents for follow up.  SUMMARY:    Anish is seen in follow-up for Hypothyroidism. Current TSH is 1.12 and fT4 is 1.07.  She is taking Levothyroxine, 75 mcg daily consistently. She is having no problems referable to her neck at present. Vit D level is 31.     Her creatinine is currently elevated at 1.16. BUN is within normal range. She notes that she has been experiencing low back pain recently. She is drinking 7-8 bottles of water daily.       Past Medical History     Patient Active Problem List   Diagnosis    Adjustment disorder with mixed anxiety and depressed mood    Morbid obesity (H)    Hypothyroidism due to Hashimoto's thyroiditis    Hidradenitis    Prediabetes    Bloody stools    Iron deficiency anemia due to chronic blood loss       Family History       family history includes Diabetes in her mother; Hepatitis in her maternal grandmother; Hyperlipidemia in her father and mother; Hypertension in her maternal grandmother and mother; IgA nephropathy in her brother and daughter; Liver Disease in her brother; No Known Problems in her sister, sister, sister, sister, and son; Uterine Cancer in her mother.    Social History      reports that she quit smoking about 14 years ago. Her smoking use included cigarettes. She quit smokeless tobacco use about 9 years ago. She reports that she does not drink alcohol and does not use drugs.      Review of Systems     Patient denies fatigue, weight changes, heat/cold intolerance, bowel/skin changes or CVS symptoms.   Remainder per HPI and per attached intake form.      Vital Signs     There were no vitals taken for this visit.  Wt Readings from Last 3 Encounters:   04/25/23 88.8 kg (195 lb 12 oz)    02/24/23 86.2 kg (190 lb)   11/23/22 89.4 kg (197 lb)       Physical Exam     Constitutional:  Well developed, Well nourished  HENT:  Normocephalic,   Neck: normal in appearance  Eyes:  PERRL, Conjunctiva pink  Respiratory:  No respiratory distress  Skin: No acanthosis nigricans, lipoatrophy or lipodystrophy  Neurologic:  Alert & oriented x 3, nonfocal  Psychiatric:  Affect, Mood, Insight appropriate        Assessment     1. Hypothyroidism due to Hashimoto's thyroiditis    2. Elevated serum creatinine            Plan     She is bio-chemically and physically euthyroid. She will remain on her current dose of Levothyroxine.      We will retest her Renal function in 1 month because of the elevated Creatinine.       Kathy Brown NP   Endocrinology  8/29/2023  2:23 PM      Lab Results     TSH   Date Value Ref Range Status   08/18/2023 1.12 0.30 - 4.20 uIU/mL Final   02/11/2022 2.09 0.30 - 5.00 uIU/mL Final     No components found for: THYROIDAB    No results found for: [id number]    Imaging Results   Last thyroid ultrasound:  No results found for this or any previous visit.      Last thyroid nuclear scan:  No results found for this or any previous visit.      Current Medications     Outpatient Medications Prior to Visit   Medication Sig Dispense Refill    levothyroxine (SYNTHROID/LEVOTHROID) 75 MCG tablet Take 1 tablet (75 mcg) by mouth daily 90 tablet 3    Vitamin D3 (CHOLECALCIFEROL) 125 MCG (5000 UT) tablet Take 1 tablet by mouth daily      bisacodyl (DULCOLAX) 5 MG EC tablet Take 2 tablets at 3 pm the day before your procedure. If your procedure is before 11 am, take 2 additional tablets at 11 pm. If your procedure is after 11 am, take 2 additional tablets at 6 am. For additional instructions refer to your colonoscopy prep instructions. (Patient not taking: Reported on 8/29/2023) 4 tablet 0    clindamycin (CLEOCIN T) 1 % external lotion Apply topically 2 times daily (Patient not taking: Reported on 4/25/2023) 60 mL  11    hydrOXYzine (ATARAX) 25 MG tablet TAKE 1 TABLET(25 MG) BY MOUTH AT BEDTIME AS NEEDED FOR ITCHING (Patient not taking: Reported on 4/25/2023) 90 tablet 2    metoprolol succinate ER (TOPROL-XL) 25 MG 24 hr tablet TAKE 1 TABLET(25 MG) BY MOUTH DAILY (Patient not taking: Reported on 4/25/2023) 30 tablet 3    polyethylene glycol (GOLYTELY) 236 g suspension The night before the exam at 6 pm drink an 8-ounce glass every 15 minutes until the jug is half empty. If you arrive before 11 AM: Drink the other half of the Golytely jug at 11 PM night before procedure. If you arrive after 11 AM: Drink the other half of the Golytely jug at 6 AM day of procedure. For additional instructions refer to your colonoscopy prep instructions. (Patient not taking: Reported on 4/25/2023) 4000 mL 0     No facility-administered medications prior to visit.           Virtual Visit Details    Type of service:  Video Visit     Originating Location (pt. Location): Home    Distant Location (provider location):  On-site  Platform used for Video Visit: Emiliano

## 2023-08-29 NOTE — Clinical Note
8/29/2023         RE: Anish Hauser  [address] [place] [address]        Dear Colleague,    Thank you for referring your patient, Anish Hauser, to the [facility]. Please see a copy of my visit note below.      [facility] ENDOCRINOLOGY    Thyroid Note  8/29/2023    Anish Hauser, 1980, [id number]          Reason for visit      No diagnosis found.    HPI     Anish Hauser is a very pleasant 43 year old old female who presents for follow up.  SUMMARY:    TODAY:    Past Medical History     Patient Active Problem List   Diagnosis    Adjustment disorder with mixed anxiety and depressed mood    Morbid obesity (H)    Hypothyroidism due to Hashimoto's thyroiditis    Hidradenitis    Prediabetes    Bloody stools    Iron deficiency anemia due to chronic blood loss       Family History       family history includes Diabetes in her mother; Hepatitis in her maternal grandmother; Hyperlipidemia in her father and mother; Hypertension in her maternal grandmother and mother; IgA nephropathy in her brother and daughter; Liver Disease in her brother; No Known Problems in her sister, sister, sister, sister, and son; Uterine Cancer in her mother.    Social History      reports that she quit smoking about 14 years ago. Her smoking use included cigarettes. She quit smokeless tobacco use about 9 years ago. She reports that she does not drink alcohol and does not use drugs.      Review of Systems     Patient denies fatigue, weight changes, heat/cold intolerance, bowel/skin changes or CVS symptoms.   Remainder per HPI and per attached intake form.      Vital Signs     There were no vitals taken for this visit.  Wt Readings from Last 3 Encounters:   04/25/23 88.8 kg (195 lb 12 oz)   02/24/23 86.2 kg (190 lb)   11/23/22 89.4 kg (197 lb)       Physical Exam     [unfilled]      Assessment     No diagnosis found.        Plan             Kathy Brown NP   Endocrinology  8/29/2023  2:23 PM          This note has been  dictated using voice recognition software.  Any grammatical or context distortions are unintentional and inherent to the software.     Lab Results     TSH   Date Value Ref Range Status   08/18/2023 1.12 0.30 - 4.20 uIU/mL Final   02/11/2022 2.09 0.30 - 5.00 uIU/mL Final     No components found for: THYROIDAB    No results found for: K6SGITI    Imaging Results   Last thyroid ultrasound:  No results found for this or any previous visit.      Last thyroid nuclear scan:  No results found for this or any previous visit.      Current Medications     Outpatient Medications Prior to Visit   Medication Sig Dispense Refill    levothyroxine (SYNTHROID/LEVOTHROID) 75 MCG tablet Take 1 tablet (75 mcg) by mouth daily 90 tablet 3    Vitamin D3 (CHOLECALCIFEROL) 125 MCG (5000 UT) tablet Take 1 tablet by mouth daily      bisacodyl (DULCOLAX) 5 MG EC tablet Take 2 tablets at 3 pm the day before your procedure. If your procedure is before 11 am, take 2 additional tablets at 11 pm. If your procedure is after 11 am, take 2 additional tablets at 6 am. For additional instructions refer to your colonoscopy prep instructions. (Patient not taking: Reported on 8/29/2023) 4 tablet 0    clindamycin (CLEOCIN T) 1 % external lotion Apply topically 2 times daily (Patient not taking: Reported on 4/25/2023) 60 mL 11    hydrOXYzine (ATARAX) 25 MG tablet TAKE 1 TABLET(25 MG) BY MOUTH AT BEDTIME AS NEEDED FOR ITCHING (Patient not taking: Reported on 4/25/2023) 90 tablet 2    metoprolol succinate ER (TOPROL-XL) 25 MG 24 hr tablet TAKE 1 TABLET(25 MG) BY MOUTH DAILY (Patient not taking: Reported on 4/25/2023) 30 tablet 3    polyethylene glycol (GOLYTELY) 236 g suspension The night before the exam at 6 pm drink an 8-ounce glass every 15 minutes until the jug is half empty. If you arrive before 11 AM: Drink the other half of the Golytely jug at 11 PM night before procedure. If you arrive after 11 AM: Drink the other half of the Golytely jug at 6 AM day of  "procedure. For additional instructions refer to your colonoscopy prep instructions. (Patient not taking: Reported on 4/25/2023) 4000 mL 0     No facility-administered medications prior to visit.           Virtual Visit Details    Type of service:  Video Visit     Originating Location (pt. Location): {video visit patient location:457444::\"Home\"}  {PROVIDER LOCATION On-site should be selected for visits conducted from your clinic location or adjoining [facility] hospital, academic office, or other nearby [facility] building. Off-site should be selected for all other provider locations, including home:[id number]}  Distant Location (provider location):  {virtual location provider:298915}  Platform used for Video Visit: {Virtual Visit Platforms:918065::\""[employer]\"}       Again, thank you for allowing me to participate in the care of your patient.        Sincerely,        Kathy Brown NP  "

## 2023-08-30 RX ORDER — LEVOTHYROXINE SODIUM 75 UG/1
75 TABLET ORAL DAILY
Qty: 90 TABLET | Refills: 3 | Status: SHIPPED | OUTPATIENT
Start: 2023-08-30 | End: 2024-08-22

## 2023-09-09 ENCOUNTER — OFFICE VISIT (OUTPATIENT)
Dept: FAMILY MEDICINE | Facility: CLINIC | Age: 43
End: 2023-09-09
Payer: COMMERCIAL

## 2023-09-09 VITALS
DIASTOLIC BLOOD PRESSURE: 86 MMHG | OXYGEN SATURATION: 97 % | TEMPERATURE: 98.1 F | HEART RATE: 87 BPM | WEIGHT: 203.6 LBS | RESPIRATION RATE: 18 BRPM | BODY MASS INDEX: 41.12 KG/M2 | SYSTOLIC BLOOD PRESSURE: 132 MMHG

## 2023-09-09 DIAGNOSIS — L29.89 PRURITIC ERYTHEMATOUS RASH: ICD-10-CM

## 2023-09-09 DIAGNOSIS — L03.115 CELLULITIS OF RIGHT LOWER EXTREMITY: Primary | ICD-10-CM

## 2023-09-09 PROCEDURE — 99213 OFFICE O/P EST LOW 20 MIN: CPT | Performed by: FAMILY MEDICINE

## 2023-09-09 RX ORDER — CEPHALEXIN 500 MG/1
500 CAPSULE ORAL 2 TIMES DAILY
Qty: 14 CAPSULE | Refills: 0 | Status: SHIPPED | OUTPATIENT
Start: 2023-09-09 | End: 2023-09-16

## 2023-09-09 RX ORDER — PREDNISONE 20 MG/1
40 TABLET ORAL DAILY
Qty: 10 TABLET | Refills: 0 | Status: SHIPPED | OUTPATIENT
Start: 2023-09-09 | End: 2023-09-14

## 2023-09-09 NOTE — PROGRESS NOTES
Patient presents with:  Insect Bites: Something bit on right leg       Clinical Decision Making:      ICD-10-CM    1. Cellulitis of right lower extremity  L03.115 cephALEXin (KEFLEX) 500 MG capsule      2. Pruritic erythematous rash  L29.8 predniSONE (DELTASONE) 20 MG tablet        Appearing infected and also allergic reaction to bites. Will start on keflex and prednisone. Rtc in 3-5 days if not improving.     There are no Patient Instructions on file for this visit.    HPI:  Anish Hauser is a 43 year old female who presents today complaining of   Chief Complaint   Patient presents with    Insect Bites     Something bit on right leg        History obtained from the patient.    Problem List:  2022: Iron deficiency anemia due to chronic blood loss  2022: Bloody stools  2022: Biliary colic  2021: Gallstones  2021-10: Prediabetes  2021-10: Hypothyroidism due to Hashimoto's thyroiditis  2021-10: Hidradenitis  2020-10: Hashimoto's thyroiditis  2020: Adjustment disorder with mixed anxiety and depressed mood  2020: Morbid obesity (H)  2014: Health care maintenance      Past Medical History:   Diagnosis Date    Head injury, closed, sequela 2018    Heartburn     History of angina     History of blood transfusion     Iron deficiency anemia due to chronic blood loss 2022    Motion sickness     Seasonal allergies     Thyroid disease        Social History     Tobacco Use    Smoking status: Former     Types: Cigarettes     Quit date:      Years since quittin.6    Smokeless tobacco: Former     Quit date: 2014    Tobacco comments:     No exposure to second hand smoking.   Substance Use Topics    Alcohol use: No       Review of Systems  Constitutional, HEENT, cardiovascular, pulmonary, gi and gu systems are negative, except as otherwise noted.    Vitals:    23 1647   BP: 132/86   BP Location: Left arm   Patient Position: Sitting   Cuff Size: Adult Regular   Pulse: 87   Resp: 18   Temp:  98.1  F (36.7  C)   TempSrc: Oral   SpO2: 97%   Weight: 92.4 kg (203 lb 9.6 oz)       Physical Exam  GENERAL: healthy, alert and no distress  SKIN: Erythematous pruritic rash overlying anterior right knee 5 x 5 cm in diameter, within this rash there are 3 insect bites that are blistering and pruritic.   MS: no gross musculoskeletal defects noted, no edema  NEURO: Normal strength and tone, mentation intact and speech normal  PSYCH: mentation appears normal, affect normal/bright    Results:  No results found for any visits on 09/09/23.      At the end of the encounter, I discussed results, diagnosis, medications. Discussed red flags for immediate return to clinic/ER, as well as indications for follow up if no improvement. Patient understood and agreed to plan. Patient was stable for discharge.

## 2023-09-14 ENCOUNTER — OFFICE VISIT (OUTPATIENT)
Dept: FAMILY MEDICINE | Facility: CLINIC | Age: 43
End: 2023-09-14
Payer: COMMERCIAL

## 2023-09-14 VITALS
OXYGEN SATURATION: 97 % | HEART RATE: 72 BPM | WEIGHT: 206 LBS | BODY MASS INDEX: 41.53 KG/M2 | RESPIRATION RATE: 18 BRPM | DIASTOLIC BLOOD PRESSURE: 95 MMHG | HEIGHT: 59 IN | SYSTOLIC BLOOD PRESSURE: 131 MMHG | TEMPERATURE: 97.2 F

## 2023-09-14 DIAGNOSIS — R03.0 ELEVATED BLOOD-PRESSURE READING, WITHOUT DIAGNOSIS OF HYPERTENSION: ICD-10-CM

## 2023-09-14 DIAGNOSIS — G89.29 CHRONIC RIGHT-SIDED THORACIC BACK PAIN: Primary | ICD-10-CM

## 2023-09-14 DIAGNOSIS — Z84.1 FAMILY HISTORY OF KIDNEY DISEASE: ICD-10-CM

## 2023-09-14 DIAGNOSIS — M54.6 CHRONIC RIGHT-SIDED THORACIC BACK PAIN: Primary | ICD-10-CM

## 2023-09-14 DIAGNOSIS — R79.89 ELEVATED SERUM CREATININE: ICD-10-CM

## 2023-09-14 LAB
ALBUMIN UR-MCNC: NEGATIVE MG/DL
ANION GAP SERPL CALCULATED.3IONS-SCNC: 11 MMOL/L (ref 7–15)
APPEARANCE UR: CLEAR
BACTERIA #/AREA URNS HPF: ABNORMAL /HPF
BILIRUB UR QL STRIP: NEGATIVE
BUN SERPL-MCNC: 12 MG/DL (ref 6–20)
CALCIUM SERPL-MCNC: 9.1 MG/DL (ref 8.6–10)
CHLORIDE SERPL-SCNC: 102 MMOL/L (ref 98–107)
COLOR UR AUTO: YELLOW
CREAT SERPL-MCNC: 0.86 MG/DL (ref 0.51–0.95)
DEPRECATED HCO3 PLAS-SCNC: 25 MMOL/L (ref 22–29)
EGFRCR SERPLBLD CKD-EPI 2021: 86 ML/MIN/1.73M2
GLUCOSE SERPL-MCNC: 92 MG/DL (ref 70–99)
GLUCOSE UR STRIP-MCNC: NEGATIVE MG/DL
HGB UR QL STRIP: ABNORMAL
KETONES UR STRIP-MCNC: NEGATIVE MG/DL
LEUKOCYTE ESTERASE UR QL STRIP: ABNORMAL
NITRATE UR QL: NEGATIVE
PH UR STRIP: 7 [PH] (ref 5–8)
POTASSIUM SERPL-SCNC: 4.2 MMOL/L (ref 3.4–5.3)
RBC #/AREA URNS AUTO: ABNORMAL /HPF
SODIUM SERPL-SCNC: 138 MMOL/L (ref 136–145)
SP GR UR STRIP: 1.01 (ref 1–1.03)
SQUAMOUS #/AREA URNS AUTO: ABNORMAL /LPF
UROBILINOGEN UR STRIP-ACNC: 0.2 E.U./DL
WBC #/AREA URNS AUTO: ABNORMAL /HPF

## 2023-09-14 PROCEDURE — 90686 IIV4 VACC NO PRSV 0.5 ML IM: CPT | Performed by: FAMILY MEDICINE

## 2023-09-14 PROCEDURE — 81001 URINALYSIS AUTO W/SCOPE: CPT | Performed by: FAMILY MEDICINE

## 2023-09-14 PROCEDURE — 36415 COLL VENOUS BLD VENIPUNCTURE: CPT | Performed by: FAMILY MEDICINE

## 2023-09-14 PROCEDURE — 90471 IMMUNIZATION ADMIN: CPT | Performed by: FAMILY MEDICINE

## 2023-09-14 PROCEDURE — 80048 BASIC METABOLIC PNL TOTAL CA: CPT | Performed by: FAMILY MEDICINE

## 2023-09-14 PROCEDURE — 99214 OFFICE O/P EST MOD 30 MIN: CPT | Mod: 25 | Performed by: FAMILY MEDICINE

## 2023-09-14 ASSESSMENT — ENCOUNTER SYMPTOMS: BACK PAIN: 1

## 2023-09-14 NOTE — PROGRESS NOTES
"  Assessment & Plan     Chronic right-sided thoracic back pain  - UA with Microscopic - lab collect  - Basic metabolic panel  (Ca, Cl, CO2, Creat, Gluc, K, Na, BUN)  - Urine Microscopic Exam  Elevated blood-pressure reading, without diagnosis of hypertension  Family history of kidney disease  Elevated serum creatinine  EHR reviewed.   Past medical history, problem list, past surgical history, family history, social history, medications reviewed, updated, reconciled.   Discussed possible etiology of her symptoms. Suspect this is more musculoskeletal strain or sprain of the thoracic are. We discussed supportive care for this, defers imaging, defers PT for now.   Due to concerns and family history, certainly see recent elevated Cr with endocrinologist evaluation last month. Check bmp. Check urinalysis.   We discussed importance of good blood pressure control given family history and personal weight. Offered treatment or recheck in two to three months, treatment based on this. She declines treatment at this time. Will work on lifestyle modifications to improve her weight and blood pressure. She plans to discuss with PCP at upcoming appointment.   Influenza vaccine today.        BMI:   Estimated body mass index is 40.98 kg/m  as calculated from the following:    Height as of this encounter: 1.51 m (4' 11.45\").    Weight as of this encounter: 93.4 kg (206 lb).       Mary Wilder MD  [facility]    Loida Oconnell is a 43 year old, presenting for the following health issues:  Back Pain (Lower right side back pain, for past two months, pain has been a little better the past two weeks )        9/14/2023     8:32 AM   Additional Questions   Roomed by Tia       History of Present Illness       Back Pain:  She presents for follow up of back pain. Patient's back pain is a new problem.    Original cause of back pain: not sure  First noticed back pain: more than 1 month ago  Patient feels back pain: " dailyLocation of back pain:  Right lower back  Description of back pain: sharp, shooting and stabbing  Back pain spreads: nowhere    Since patient first noticed back pain, pain is: gradually improving  Does back pain interfere with her job:  No  On a scale of 1-10 (10 being the worst), patient describes pain as:  5  What makes back pain worse: bending, certain positions, lying down and twisting   Acupuncture: not tried  Acetaminophen: not tried  Activity or exercise: not tried  Chiropractor:  Not tried  Cold: not tried  Heat: not tried  Massage: not tried  Muscle relaxants: not tried  NSAIDS: not tried  Opioids: not tried  Physical Therapy: not tried  Rest: not helpful  Steroid Injection: not tried  Stretching: not tried  Surgery: not tried  TENS unit: not tried  Topical pain relievers: not tried  Other healthcare providers patient is seeing for back pain: None    She eats 2-3 servings of fruits and vegetables daily.She consumes 0 sweetened beverage(s) daily.She exercises with enough effort to increase her heart rate 30 to 60 minutes per day.  She exercises with enough effort to increase her heart rate 4 days per week.   She is taking medications regularly.     [age] year old here with concerns of back pain and worry because so many family members have kidney problems.   She notes back pain, it started three to four weeks ago. Has had this pain before, but over last weeks it was really worsening. Every time she moved, bent over, twisted the pain was worse. When she lies down on her left side it hurts. Is wondering if this is her kidney. When she made the appointment of course the pain was very present, but since yesterday things are actually better. She worries about her kidney because her daughter, brother, mom all have kidney problems. Daughter has IgA nephropathy and the doctor is always asking her if she has back pain.   No low back pain.   No dysuria, hematuria.   No fatigue. Normal appetite.   Does not  "take any medicine except her thyroid medicine.           Past Medical History:   Diagnosis Date    Head injury, closed, sequela 1/16/2018    Heartburn     History of angina     History of blood transfusion     Iron deficiency anemia due to chronic blood loss 11/25/2022    Motion sickness     Seasonal allergies     Thyroid disease      Past Surgical History:   Procedure Laterality Date    BREAST CYST INCISION AND DRAINAGE Left     sebacous cyst, Dr. Sidhu    COLONOSCOPY N/A 2/24/2023    Procedure: COLONOSCOPY;  Surgeon: Montana Tadeo MD;  Location:  GI    LAPAROSCOPIC CHOLECYSTECTOMY N/A 1/25/2022    Procedure: CHOLECYSTECTOMY, LAPAROSCOPIC;  Surgeon: Julissa Mcmullen DO;  Location: [facility]     Family History   Problem Relation Age of Onset    Diabetes Mother     Hypertension Mother     Hyperlipidemia Mother     Uterine Cancer Mother         pre cancer    Hyperlipidemia Father     No Known Problems Sister     No Known Problems Sister     No Known Problems Sister     No Known Problems Sister     Liver Disease Brother         fatty liver    IgA nephropathy Brother         on dialysis    Hepatitis Maternal Grandmother     Hypertension Maternal Grandmother     IgA nephropathy Daughter     No Known Problems Son     Coronary Artery Disease No family hx of     Breast Cancer No family hx of     Cancer - colorectal No family hx of     Ovarian Cancer No family hx of     Prostate Cancer No family hx of     Other Cancer No family hx of     Mental Illness No family hx of     Cerebrovascular Disease No family hx of     Anesthesia Reaction No family hx of     Asthma No family hx of     Osteoporosis No family hx of     Known Genetic Syndrome No family hx of     Obesity No family hx of     Unknown/Adopted No family hx of            Review of Systems   Musculoskeletal:  Positive for back pain.            Objective    BP (!) 131/95   Pulse 72   Temp 97.2  F (36.2  C) (Temporal)   Resp 18   Ht 1.51 m (4' 11.45\")   Wt " 93.4 kg (206 lb)   LMP 08/24/2023 (Approximate)   SpO2 97%   BMI 40.98 kg/m    Body mass index is 40.98 kg/m .  BP Readings from Last 6 Encounters:   09/14/23 (!) 131/95   09/09/23 132/86   04/25/23 124/85   02/24/23 (!) 126/98   11/23/22 133/87   08/17/22 136/88       Physical Exam   GENERAL: healthy, alert and no distress  EYES: Eyes grossly normal to inspection, PERRL and conjunctivae and sclerae normal  HENT: ear canals and TM's normal, nose and mouth without ulcers or lesions  NECK: no adenopathy, no asymmetry, masses, or scars and thyroid normal to palpation  RESP: lungs clear to auscultation - no rales, rhonchi or wheezes  CV: regular rate and rhythm, normal S1 S2, no murmur  MS: no gross musculoskeletal defects noted, no edema  Comprehensive back pain exam:  Tenderness of right thoracic paraspinal muscles only, Range of motion not limited by pain, Lower extremity strength functional and equal on both sides, Lower extremity reflexes within normal limits bilaterally, and Straight leg raise negative bilaterally, no CVA tenderness    Results for orders placed or performed in visit on 09/14/23   UA with Microscopic - lab collect     Status: Abnormal   Result Value Ref Range    Color Urine Yellow Colorless, Straw, Light Yellow, Yellow    Appearance Urine Clear Clear    Glucose Urine Negative Negative mg/dL    Bilirubin Urine Negative Negative    Ketones Urine Negative Negative mg/dL    Specific Gravity Urine 1.010 1.005 - 1.030    Blood Urine Trace (A) Negative    pH Urine 7.0 5.0 - 8.0    Protein Albumin Urine Negative Negative mg/dL    Urobilinogen Urine 0.2 0.2, 1.0 E.U./dL    Nitrite Urine Negative Negative    Leukocyte Esterase Urine Trace (A) Negative   Basic metabolic panel  (Ca, Cl, CO2, Creat, Gluc, K, Na, BUN)     Status: Normal   Result Value Ref Range    Sodium 138 136 - 145 mmol/L    Potassium 4.2 3.4 - 5.3 mmol/L    Chloride 102 98 - 107 mmol/L    Carbon Dioxide (CO2) 25 22 - 29 mmol/L    Anion  Gap 11 7 - 15 mmol/L    Urea Nitrogen 12.0 6.0 - 20.0 mg/dL    Creatinine 0.86 0.51 - 0.95 mg/dL    Calcium 9.1 8.6 - 10.0 mg/dL    Glucose 92 70 - 99 mg/dL    GFR Estimate 86 >60 mL/min/1.73m2   Urine Microscopic Exam     Status: Abnormal   Result Value Ref Range    Bacteria Urine Few (A) None Seen /HPF    RBC Urine 2-5 (A) 0-2 /HPF /HPF    WBC Urine 5-10 (A) 0-5 /HPF /HPF    Squamous Epithelials Urine Few (A) None Seen /LPF           Prior to immunization administration, verified patients identity using patient s name and date of birth. Please see Immunization Activity for additional information.     Screening Questionnaire for Adult Immunization    Are you sick today?   No   Do you have allergies to medications, food, a vaccine component or latex?   No   Have you ever had a serious reaction after receiving a vaccination?   No   Do you have a long-term health problem with heart, lung, kidney, or metabolic disease (e.g., diabetes), asthma, a blood disorder, no spleen, complement component deficiency, a cochlear implant, or a spinal fluid leak?  Are you on long-term aspirin therapy?   No   Do you have cancer, leukemia, HIV/AIDS, or any other immune system problem?   No   Do you have a parent, brother, or sister with an immune system problem?   Yes   In the past 3 months, have you taken medications that affect  your immune system, such as prednisone, other steroids, or anticancer drugs; drugs for the treatment of rheumatoid arthritis, Crohn s disease, or psoriasis; or have you had radiation treatments?   No   Have you had a seizure, or a brain or other nervous system problem?   No   During the past year, have you received a transfusion of blood or blood    products, or been given immune (gamma) globulin or antiviral drug?   No   For women: Are you pregnant or is there a chance you could become       pregnant during the next month?   No   Have you received any vaccinations in the past 4 weeks?   No     Immunization  questionnaire was positive for at least one answer.  Notified .      Patient instructed to remain in clinic for 15 minutes afterwards, and to report any adverse reactions.     Screening performed by Tasia Beauchamp CMA on 9/14/2023 at 8:35 AM.

## 2023-09-19 PROBLEM — G89.29 CHRONIC RIGHT-SIDED THORACIC BACK PAIN: Status: ACTIVE | Noted: 2023-09-19

## 2023-09-19 PROBLEM — Z84.1 FAMILY HISTORY OF KIDNEY DISEASE: Status: ACTIVE | Noted: 2023-09-19

## 2023-09-19 PROBLEM — M54.6 CHRONIC RIGHT-SIDED THORACIC BACK PAIN: Status: ACTIVE | Noted: 2023-09-19

## 2023-09-19 PROBLEM — R79.89 ELEVATED SERUM CREATININE: Status: ACTIVE | Noted: 2023-09-19

## 2023-09-19 PROBLEM — R03.0 ELEVATED BLOOD-PRESSURE READING, WITHOUT DIAGNOSIS OF HYPERTENSION: Status: ACTIVE | Noted: 2023-09-19

## 2023-09-21 ENCOUNTER — MYC MEDICAL ADVICE (OUTPATIENT)
Dept: FAMILY MEDICINE | Facility: CLINIC | Age: 43
End: 2023-09-21
Payer: COMMERCIAL

## 2023-09-29 ENCOUNTER — LAB (OUTPATIENT)
Dept: LAB | Facility: CLINIC | Age: 43
End: 2023-09-29
Payer: COMMERCIAL

## 2023-09-29 DIAGNOSIS — R79.89 ELEVATED SERUM CREATININE: ICD-10-CM

## 2023-09-29 LAB
ANION GAP SERPL CALCULATED.3IONS-SCNC: 11 MMOL/L (ref 7–15)
BUN SERPL-MCNC: 12.4 MG/DL (ref 6–20)
CALCIUM SERPL-MCNC: 9 MG/DL (ref 8.6–10)
CHLORIDE SERPL-SCNC: 105 MMOL/L (ref 98–107)
CREAT SERPL-MCNC: 1.05 MG/DL (ref 0.51–0.95)
DEPRECATED HCO3 PLAS-SCNC: 24 MMOL/L (ref 22–29)
EGFRCR SERPLBLD CKD-EPI 2021: 68 ML/MIN/1.73M2
GLUCOSE SERPL-MCNC: 95 MG/DL (ref 70–99)
POTASSIUM SERPL-SCNC: 4.3 MMOL/L (ref 3.4–5.3)
SODIUM SERPL-SCNC: 140 MMOL/L (ref 135–145)

## 2023-09-29 PROCEDURE — 80048 BASIC METABOLIC PNL TOTAL CA: CPT

## 2023-09-29 PROCEDURE — 36415 COLL VENOUS BLD VENIPUNCTURE: CPT

## 2023-10-04 ENCOUNTER — OFFICE VISIT (OUTPATIENT)
Dept: FAMILY MEDICINE | Facility: CLINIC | Age: 43
End: 2023-10-04
Payer: COMMERCIAL

## 2023-10-04 VITALS
HEART RATE: 67 BPM | RESPIRATION RATE: 16 BRPM | WEIGHT: 201 LBS | TEMPERATURE: 97.8 F | HEIGHT: 60 IN | BODY MASS INDEX: 39.46 KG/M2 | DIASTOLIC BLOOD PRESSURE: 84 MMHG | OXYGEN SATURATION: 96 % | SYSTOLIC BLOOD PRESSURE: 118 MMHG

## 2023-10-04 DIAGNOSIS — D50.0 IRON DEFICIENCY ANEMIA DUE TO CHRONIC BLOOD LOSS: ICD-10-CM

## 2023-10-04 DIAGNOSIS — Z23 ENCOUNTER FOR IMMUNIZATION: ICD-10-CM

## 2023-10-04 DIAGNOSIS — R03.0 ELEVATED BLOOD-PRESSURE READING, WITHOUT DIAGNOSIS OF HYPERTENSION: Primary | ICD-10-CM

## 2023-10-04 DIAGNOSIS — R73.03 PREDIABETES: ICD-10-CM

## 2023-10-04 PROBLEM — K92.1 BLOODY STOOLS: Status: RESOLVED | Noted: 2022-11-23 | Resolved: 2023-10-04

## 2023-10-04 LAB
ERYTHROCYTE [DISTWIDTH] IN BLOOD BY AUTOMATED COUNT: 13.9 % (ref 10–15)
FERRITIN SERPL-MCNC: 25 NG/ML (ref 6–175)
HBA1C MFR BLD: 5.6 % (ref 0–5.6)
HCT VFR BLD AUTO: 38.7 % (ref 35–47)
HGB BLD-MCNC: 12.3 G/DL (ref 11.7–15.7)
MAGNESIUM SERPL-MCNC: 2.1 MG/DL (ref 1.7–2.3)
MCH RBC QN AUTO: 28.1 PG (ref 26.5–33)
MCHC RBC AUTO-ENTMCNC: 31.8 G/DL (ref 31.5–36.5)
MCV RBC AUTO: 89 FL (ref 78–100)
PLATELET # BLD AUTO: 187 10E3/UL (ref 150–450)
RBC # BLD AUTO: 4.37 10E6/UL (ref 3.8–5.2)
WBC # BLD AUTO: 5.4 10E3/UL (ref 4–11)

## 2023-10-04 PROCEDURE — 83735 ASSAY OF MAGNESIUM: CPT | Performed by: FAMILY MEDICINE

## 2023-10-04 PROCEDURE — 36415 COLL VENOUS BLD VENIPUNCTURE: CPT | Performed by: FAMILY MEDICINE

## 2023-10-04 PROCEDURE — 99214 OFFICE O/P EST MOD 30 MIN: CPT | Performed by: FAMILY MEDICINE

## 2023-10-04 PROCEDURE — 83036 HEMOGLOBIN GLYCOSYLATED A1C: CPT | Performed by: FAMILY MEDICINE

## 2023-10-04 PROCEDURE — 82728 ASSAY OF FERRITIN: CPT | Performed by: FAMILY MEDICINE

## 2023-10-04 PROCEDURE — 85027 COMPLETE CBC AUTOMATED: CPT | Performed by: FAMILY MEDICINE

## 2023-10-04 PROCEDURE — 91320 SARSCV2 VAC 30MCG TRS-SUC IM: CPT | Performed by: FAMILY MEDICINE

## 2023-10-04 PROCEDURE — 90480 ADMN SARSCOV2 VAC 1/ONLY CMP: CPT | Performed by: FAMILY MEDICINE

## 2023-10-04 NOTE — PROGRESS NOTES
"  Assessment & Plan     Elevated blood-pressure reading, without diagnosis of hypertension  Reviewed LSM.  Follow-up for bp check and consider medications at that time.    - PRIMARY CARE FOLLOW-UP SCHEDULING    Encounter for immunization  - COVID-19 12+ (2023-24) (PFIZER)    Iron deficiency anemia due to chronic blood loss  Improved   - Ferritin  - CBC with platelets  - Ferritin  - CBC with platelets    Prediabetes  Improved with LsM   - Hemoglobin A1c  - Magnesium  - Hemoglobin A1c  - Magnesium         BMI:   Estimated body mass index is 39.26 kg/m  as calculated from the following:    Height as of this encounter: 1.524 m (5').    Weight as of this encounter: 91.2 kg (201 lb).   Weight management plan: Discussed healthy diet and exercise guidelines      Bibi Colunga MD  [facility]    Loida Oconnell is a 43 year old, presenting for the following health issues:  Follow Up (Bp follow up)        10/4/2023    10:57 AM   Additional Questions   Roomed by hser   Accompanied by self     DBP  and -120s  Cutting out sugary foods  Walking 30min daily really helps keep bp down.      Has been keeping track of bp at different times of the day  Reviewed log book in phone  128/93  137/93  130/88  132/89  116/82  125/92  125/100  128/88    No alcohol  No nicotine     Feeling good.    Reviewed no protein in urine over past year    \"Lazy leg\" mostly at night worse over past 3 months.   Feels like she has bees deep inside her legs and has to stretch out legs to relieve symptoms for a little while.  No period last month but regular periods prior to this.  UPT negative a few days ago.  Periods are heavy first 2 days usually.  Mild anemia last winter  Hemoglobin   Date Value Ref Range Status   10/04/2023 12.3 11.7 - 15.7 g/dL Final   06/05/2014 12.2 11.7 - 15.7 g/dL Final   ]      History of Present Illness       Reason for visit:  High blood pressure  Symptom onset:  More than a month  Symptom " intensity:  Moderate  Symptom progression:  Staying the same  Had these symptoms before:  Yes  Has tried/received treatment for these symptoms:  No  What makes it worse:  Not enough sleep, sugar, high sodium  What makes it better:  Good sleep, walk, reduce sugar and sodium    She eats 2-3 servings of fruits and vegetables daily.She consumes 0 sweetened beverage(s) daily.She exercises with enough effort to increase her heart rate 30 to 60 minutes per day.  She exercises with enough effort to increase her heart rate 5 days per week.   She is taking medications regularly.           Review of Systems       Objective    /84 (BP Location: Right arm, Patient Position: Sitting, Cuff Size: Adult Regular)   Pulse 67   Temp 97.8  F (36.6  C) (Temporal)   Resp 16   Ht 1.524 m (5')   Wt 91.2 kg (201 lb)   LMP 08/25/2023 (Approximate)   SpO2 96%   BMI 39.26 kg/m    Body mass index is 39.26 kg/m .  Physical Exam   Wt Readings from Last 3 Encounters:   10/04/23 91.2 kg (201 lb)   09/14/23 93.4 kg (206 lb)   09/09/23 92.4 kg (203 lb 9.6 oz)       Patient's last menstrual period was 08/25/2023 (approximate).    All normal as below except abnormalities include: grossly normal exam   General is a  43 year old sitting comfortably in no apparent distress   HEENT:  TM are clear bilaterally.  Eye exams within normal   Neck: Supple without lymphadenopathy or thyromegally  CV: Regular rate and rhythm S1S2 without rubs, murmurs or gallops,   Lungs: Clear to auscultation bilaterally  Abd:  +BS, soft NT/ND,  No masses or organomegally,   Extremities: Warm, No Edema, 2+ Pedal and radial pulses bilaterally  Skin: No lesions or rashes noted  Neuro: Able to ambulate around the exam room with equal movement, strength and normal coordination of the upper and lower extremeties symmetrically    History summarized from1-2:Dr Wilder saw pt for right back pain- elevated bp, elevated cr, etc.  Advised to follow-up with pcp   Old Records-1:  [facility] med[facility]  Lab tests reviewed-1: 2023     Follow-up Visit   Expected date:  Jan 04, 2024 (Approximate)      Follow Up Appointment Details:     Follow-up with whom?: PCP    Follow-Up for what?: Adult Preventive    How?: In Person                     Bibi Colunga MD             Prior to immunization administration, verified patients identity using patient s name and date of birth. Please see Immunization Activity for additional information.     Screening Questionnaire for Adult Immunization    Are you sick today?   No   Do you have allergies to medications, food, a vaccine component or latex?   No   Have you ever had a serious reaction after receiving a vaccination?   No   Do you have a long-term health problem with heart, lung, kidney, or metabolic disease (e.g., diabetes), asthma, a blood disorder, no spleen, complement component deficiency, a cochlear implant, or a spinal fluid leak?  Are you on long-term aspirin therapy?   No   Do you have cancer, leukemia, HIV/AIDS, or any other immune system problem?   Yes   Do you have a parent, brother, or sister with an immune system problem?   Yes   In the past 3 months, have you taken medications that affect  your immune system, such as prednisone, other steroids, or anticancer drugs; drugs for the treatment of rheumatoid arthritis, Crohn s disease, or psoriasis; or have you had radiation treatments?   No   Have you had a seizure, or a brain or other nervous system problem?   No   During the past year, have you received a transfusion of blood or blood    products, or been given immune (gamma) globulin or antiviral drug?   No   For women: Are you pregnant or is there a chance you could become       pregnant during the next month?   No   Have you received any vaccinations in the past 4 weeks?   Yes     Immunization questionnaire was positive for at least one answer.  Notified  provider.      Patient instructed to remain in clinic for 15 minutes afterwards, and to report any adverse reactions.     Screening performed by Jarrell Veloz MA on 10/4/2023 at 10:59 AM.

## 2023-10-04 NOTE — PATIENT INSTRUCTIONS
Consider Lo-Salt and No- Salt     Dietary Approaches to Stop Hypertension (The DASH Diet)   What is hypertension?   Hypertension is the term for blood pressure that is consistently higher than normal. Blood pressure is the force of blood against artery walls. Blood pressure can be unhealthy if it is above 120/80. The higher your blood pressure, the greater the health risk.     High blood pressure can be controlled if you take these steps:   Maintain a healthy weight.   Be physically active.   Follow a healthy eating plan, which includes foods lower in salt and sodium.   If you drink alcoholic beverages, do so in moderation.   As noted in this list, diet affects high blood pressure. Following the DASH diet and reducing the amount of sodium in your diet will help lower your blood pressure. It will also help prevent high blood pressure.     What is the DASH diet?   Dietary Approaches to Stop Hypertension (DASH) is a diet that is low in saturated fat, cholesterol, and total fat. It emphasizes fruits, vegetables, and low-fat dairy foods. The DASH diet also includes whole-grain products, fish, poultry, and nuts. It encourages fewer servings of red meat, sweets, and sugar-containing beverages. It is rich in magnesium, potassium, and calcium, as well as protein and fiber.     How do I get started on the DASH diet?   The DASH diet requires no special foods and has no hard-to-follow recipes. Start by seeing how DASH compares with your current eating habits.  The DASH eating plan shown is based on 2,000 calories a day. Your health care provider or a dietitian can help you determine how many calories a day you need. Most adults need somewhere between 1600 and 2800 calories a day. Serving sizes will vary between 1/2 cup and 1 1/4 cups.     Check the product's nutrition label to determine serving sizes of particular products.    Food Group   Number of Servings   Examples of Serving Size    Grains and grain products   7 to 8   1  slice of bread    1 cup ready-to-eat cold cereal    1/2 cup cooked rice, pasta, or   cereal    Vegetables   4 to 5   1 cup raw leafy vegetable    1/2 cup cooked vegetable    6 oz. vegetable juice    Fruits   4 to 5   1 medium fruit       1/4 cup dried fruit    1/2 cup fresh, frozen, or canned fruit    6 oz fruit juice    Low-fat or fat-free dairy foods   2 to 3   8 oz milk    1 cup yogurt    1 1/2 oz cheese    Lean meats, poultry, or fish   2 or fewer   3 oz cooked lean meat, skinless poultry, or fish    Nuts, seeds, and dry beans   4 to 5 per week   1/3 cup or 1 1/2 oz nuts    1 tablespoon or 1/2 oz seeds    1/2 cup cooked dry beans     Fats and oils   2 to 3   1 teaspoon soft margarine    1 tablespoon low-fat mayonnaise    2 tablespoons light salad dressing    1 teaspoon vegetable oil   Sweets   5 per week   1 tablespoon sugar              8 oz lemonade    1 tablespoon jelly or jam     1/2 oz jelly beans     Make changes gradually. Here are some suggestions that might help:     If you now eat 1 or 2 servings of vegetables a day, add a serving at lunch and another at dinner.   If you don't eat fruit now or have only juice at breakfast, add a serving to your meals or have it as a snack.   Drink milk or water with lunch or dinner instead of soda, sugar-sweetened tea, or alcohol. Choose low-fat (1%) or fat-free (skim) dairy products to reduce how much saturated fat, total fat, cholesterol, and calories you eat. If you have trouble digesting dairy products, try taking lactase enzyme pills or drops (available at drugstores and groceries) with the dairy foods. Or buy lactose-free milk or milk with lactase enzyme added to it.   Read food labels on margarines and salad dressings to choose products lowest in fat.   If you now eat large portions of meat, cut back gradually--by a half or a third at each meal. Limit meat to 6 ounces a day (2 servings). Three to four ounces is about the size of a deck of cards.   Have 2 or more  vegetarian-style (meatless) meals each week. Increase servings of vegetables, rice, pasta, and beans in all meals. Try casseroles and pasta, and stir-mays dishes, which have less meat and more vegetables, grains, and beans.   Use fruits canned in their own juice. Fresh fruits require little or no preparation. Dried fruits are a good choice to carry with you or to have ready in the car.   Try these snacks ideas: unsalted pretzels or nuts mixed with raisins, augusto crackers, low-fat and fat-free yogurt and frozen yogurt, popcorn with no salt or butter added, and raw vegetables.   Choose whole grain foods to get more nutrients, including minerals and fiber. For example, choose whole-wheat bread or whole-grain cereals.   Use fresh, frozen, or no-salt-added canned vegetables.     Remember to also reduce the salt and sodium in your diet. Try to have no more than 2000 milligrams (mg) of sodium per day, with a goal of further reducing the sodium to 1500 mg per day. Three important ways to reduce sodium are:     Use reduced-sodium or no-salt-added food products.   Use less salt when you prepare foods and do not add salt to your food at the table.   Read fool labels. Aim for foods that are less than 5 percent of the daily value of sodium.     The DASH eating plan was not designed for weight loss. But it contains many lower calorie foods, such as fruits and vegetables. You can make it lower in calories by replacing higher calorie foods with more fruits and vegetables. Some ideas to increase fruits and vegetables and decrease calories include:     Eat a medium apple instead of four shortbread cookies. You'll save 80 calories.   Eat 1/4 cup of dried apricots instead of a 2-ounce bag of pork rinds. You'll save 230 calories.   Have a hamburger that's 3 ounces instead of 6 ounces. Add a 1/2 cup serving of carrots and a 1/2 cup serving of spinach. You'll save more than 200 calories.   Instead of 5 ounces of chicken, have a stir mays  with 2 ounces of chicken and 1 and 1/2 cups of raw vegetables. Use a small amount of vegetable oil. You'll save 50 calories.   Have a 1/2 cup serving of low-fat frozen yogurt instead of a 1 and 1/2 ounce milk chocolate bar. You'll save about 110 calories.   Use low-fat or fat-free condiments, such as fat free salad dressings.   Eat smaller portions--cut back gradually.   Use food labels to compare fat content in packaged foods. Items marked low-fat or fat-free may be lower in fat without being lower in calories than their regular versions.   Limit foods with lots of added sugar, such as pies, flavored yogurts, candy bars, ice cream, sherbet, regular soft drinks, and fruit drinks.   Drink water or club soda instead of cola or other soda drinks.     Based on National Institutes of Health Guidelines. Published by [employer].   Copyright   2004 [employer] and/or one of its subsidiaries. All Rights Reserved.

## 2023-10-16 RX ORDER — FERROUS GLUCONATE 324(38)MG
TABLET ORAL
Qty: 21 TABLET | Refills: 4 | Status: SHIPPED | OUTPATIENT
Start: 2023-10-16

## 2024-02-04 ENCOUNTER — HEALTH MAINTENANCE LETTER (OUTPATIENT)
Age: 44
End: 2024-02-04

## 2024-02-06 ENCOUNTER — DOCUMENTATION ONLY (OUTPATIENT)
Dept: ENDOCRINOLOGY | Facility: CLINIC | Age: 44
End: 2024-02-06
Payer: COMMERCIAL

## 2024-02-06 DIAGNOSIS — E06.3 HYPOTHYROIDISM DUE TO HASHIMOTO'S THYROIDITIS: Primary | ICD-10-CM

## 2024-02-12 ENCOUNTER — LAB (OUTPATIENT)
Dept: LAB | Facility: CLINIC | Age: 44
End: 2024-02-12
Payer: COMMERCIAL

## 2024-02-12 DIAGNOSIS — E06.3 HYPOTHYROIDISM DUE TO HASHIMOTO'S THYROIDITIS: ICD-10-CM

## 2024-02-12 LAB
CREAT SERPL-MCNC: 1.16 MG/DL (ref 0.51–0.95)
EGFRCR SERPLBLD CKD-EPI 2021: 60 ML/MIN/1.73M2
T4 FREE SERPL-MCNC: 1.31 NG/DL (ref 0.9–1.7)
TSH SERPL DL<=0.005 MIU/L-ACNC: 0.72 UIU/ML (ref 0.3–4.2)

## 2024-02-12 PROCEDURE — 82565 ASSAY OF CREATININE: CPT

## 2024-02-12 PROCEDURE — 36415 COLL VENOUS BLD VENIPUNCTURE: CPT

## 2024-02-12 PROCEDURE — 84443 ASSAY THYROID STIM HORMONE: CPT

## 2024-02-12 PROCEDURE — 84439 ASSAY OF FREE THYROXINE: CPT

## 2024-02-14 SDOH — HEALTH STABILITY: PHYSICAL HEALTH: ON AVERAGE, HOW MANY DAYS PER WEEK DO YOU ENGAGE IN MODERATE TO STRENUOUS EXERCISE (LIKE A BRISK WALK)?: 3 DAYS

## 2024-02-14 SDOH — HEALTH STABILITY: PHYSICAL HEALTH: ON AVERAGE, HOW MANY MINUTES DO YOU ENGAGE IN EXERCISE AT THIS LEVEL?: 30 MIN

## 2024-02-14 ASSESSMENT — SOCIAL DETERMINANTS OF HEALTH (SDOH): HOW OFTEN DO YOU GET TOGETHER WITH FRIENDS OR RELATIVES?: ONCE A WEEK

## 2024-02-14 NOTE — COMMUNITY RESOURCES LIST (ENGLISH)
02/14/2024   [facility]  N/A  For questions about this resource list or additional care needs, please contact your primary care clinic or care manager.  Phone: [phone]   Email: N/A   Address: [address] [place] [id number]   Hours: N/A        Hotlines and Helplines       Hotline - Housing crisis  1  [facility] Distance: 12.26 miles      Phone/Virtual   [address] [place] [address]  Language: English  Hours: Mon - Sun Open 24 Hours   Phone: (559) 552-5565 Email: communications@[facility].org Website: https://[facility]-[place].org/oursaviourshousing/     2  [facility] Distance: 13.87 miles      Phone/Virtual   [address] [place] [address]  Language: English  Hours: Mon - Sun Open 24 Hours   Phone: (726) 675-7492 Email: info@[facility].[employer] Website: http://www.[facility].org          Housing       Coordinated Entry access point  3  [facility] Distance: 4.73 miles      In-Person, Phone/Virtual   [address] [place] [address]  Language: English  Hours: Mon - Fri 8:30 AM - 4:30 PM  Fees: Free   Phone: (266) [phone] Email: info@[facility].org Website: https://www.[facility].org/locations/[facility]/     4  [facility] - Coordinated Access to Housing and Shelter ([facility]) - Coordinated Access - Coordinated Entry access point Distance: 7.03 miles      In-Person, Phone/Virtual   [address] [place] [address]  Language: English  Hours: Mon - Fri 8:00 AM - 4:30 PM  Fees: Free   Phone: (670) 793-6544 Website: https://www.[facility]./residents/assistance-support/assistance/housing-services-support     Drop-in center or day shelter  5  [facility] Distance: 3.2 miles      In-Person   [address] [place] [address]  Language: English  Hours: Mon - Fri 9:00 AM - 4:00 PM  Fees: Free   Phone: ([phone]) [phone] Email: [email or web address]@[email or web address].org Website:  http://[facility].org     6  [facility] Distance: 12.28 miles      In-Person   [address] [place] [address]  Language: English, Guyanese, Turkish  Hours: Mon - Sat 7:00 AM - 3:00 PM  Fees: Free, Self Pay   Phone: (747) 598-5299 Email: info@[employer] Website: https://www.[employer]/locations/opportunity-center/     Housing search assistance  7  [facility] - Housing Search Assistance Distance: 4.28 miles      Phone/Virtual   [address] [place] [address]  Language: Mohawk, English, Hmong, Abbey, Guyanese, Turkish  Hours: Mon - Fri Appt. Only  Fees: Free   Phone: (681) 164-4665 Website: https://[place].[employer]/     8  [facility] ([facility]) - Main Office Distance: 5.39 miles      Phone/Virtual   [address] [place] [address]  Language: English, Abbey, Myanmar (Belarusian), Nigerian  Hours: Mon - Fri 9:00 AM - 5:00 PM Appt. Only  Fees: Free   Phone: (263) 834-1964 Email: info@[employer] Website: http://www.[facility].org     Shelter for families  9   [facility] Distance: 12.83 miles      In-Person   [address] [place] [address]  Language: English  Hours: Mon - Fri 3:00 PM - 9:00 AM , Sat - Sun Open 24 Hours  Fees: Free   Phone: (124) 405-3689 Ext.1 Website: https://www.[facility].org/2020/07/03/emergency-family-shelter/     10  [facility] Distance: 24.87 miles      In-Person   [address] [place] [address]  Language: English  Hours: Mon - Sun Open 24 Hours  Fees: Free, Self Pay   Phone: (334) [phone] Email: Felix@[facility].org Website: http://www.[facility].org/     Shelter for individuals  11  [place] [facility] - Housing Help Distance: 4.42 miles      Phone/Virtual   [address] [facility] [address] [facility] [address] [place] [address]  Language: English  Hours: Mon - Fri 8:00 AM - 5:00 PM   Phone: (838) [phone]  Email: [email or web address]. Website: https://[facility].org/     12  [facility] [place] [facility] [place] [facility] [place] [facility] Distance: 4.77 miles      In-Person   [address] [place] [address]  Language: English  Hours: Mon - Sun 5:00 PM - 10:00 AM  Fees: Free, Self Pay   Phone: (925) 430-5274 Email: info@[employer] Website: https://www.[employer]/locations/[facility]/          Important Numbers & Websites       Emergency Services   911  City Services   311  Poison Control   (325) 448-4958  Suicide Prevention Lifeline   (519) 523-7216 (TALK)  Child Abuse Hotline   (302) 575-3255 (4-A-Child)  Sexual Assault Hotline   (667) 441-6530 (HOPE)  National Runaway Safeline   (586) 782-1466 (RUNAWAY)  All-Options Talkline   (208) 569-2422  Substance Abuse Referral   (864) 121-8541 (HELP)

## 2024-02-15 ENCOUNTER — OFFICE VISIT (OUTPATIENT)
Dept: FAMILY MEDICINE | Facility: CLINIC | Age: 44
End: 2024-02-15
Payer: COMMERCIAL

## 2024-02-15 VITALS
BODY MASS INDEX: 40.72 KG/M2 | TEMPERATURE: 97.6 F | DIASTOLIC BLOOD PRESSURE: 91 MMHG | RESPIRATION RATE: 16 BRPM | OXYGEN SATURATION: 96 % | HEIGHT: 59 IN | HEART RATE: 69 BPM | WEIGHT: 202 LBS | SYSTOLIC BLOOD PRESSURE: 133 MMHG

## 2024-02-15 DIAGNOSIS — Z12.31 VISIT FOR SCREENING MAMMOGRAM: ICD-10-CM

## 2024-02-15 DIAGNOSIS — R03.0 ELEVATED BP WITHOUT DIAGNOSIS OF HYPERTENSION: ICD-10-CM

## 2024-02-15 DIAGNOSIS — R07.9 CHEST PAIN, UNSPECIFIED TYPE: ICD-10-CM

## 2024-02-15 DIAGNOSIS — R22.1 NECK MASS: ICD-10-CM

## 2024-02-15 DIAGNOSIS — Z00.00 ROUTINE GENERAL MEDICAL EXAMINATION AT A HEALTH CARE FACILITY: Primary | ICD-10-CM

## 2024-02-15 DIAGNOSIS — R79.89 ELEVATED SERUM CREATININE: ICD-10-CM

## 2024-02-15 DIAGNOSIS — Z11.3 SCREEN FOR STD (SEXUALLY TRANSMITTED DISEASE): ICD-10-CM

## 2024-02-15 PROBLEM — G89.29 CHRONIC RIGHT-SIDED THORACIC BACK PAIN: Status: RESOLVED | Noted: 2023-09-19 | Resolved: 2024-02-15

## 2024-02-15 PROBLEM — M54.6 CHRONIC RIGHT-SIDED THORACIC BACK PAIN: Status: RESOLVED | Noted: 2023-09-19 | Resolved: 2024-02-15

## 2024-02-15 PROCEDURE — 36415 COLL VENOUS BLD VENIPUNCTURE: CPT | Performed by: STUDENT IN AN ORGANIZED HEALTH CARE EDUCATION/TRAINING PROGRAM

## 2024-02-15 PROCEDURE — 87591 N.GONORRHOEAE DNA AMP PROB: CPT | Performed by: STUDENT IN AN ORGANIZED HEALTH CARE EDUCATION/TRAINING PROGRAM

## 2024-02-15 PROCEDURE — 99396 PREV VISIT EST AGE 40-64: CPT | Performed by: STUDENT IN AN ORGANIZED HEALTH CARE EDUCATION/TRAINING PROGRAM

## 2024-02-15 PROCEDURE — 86780 TREPONEMA PALLIDUM: CPT | Performed by: STUDENT IN AN ORGANIZED HEALTH CARE EDUCATION/TRAINING PROGRAM

## 2024-02-15 PROCEDURE — 86803 HEPATITIS C AB TEST: CPT | Performed by: STUDENT IN AN ORGANIZED HEALTH CARE EDUCATION/TRAINING PROGRAM

## 2024-02-15 PROCEDURE — 87491 CHLMYD TRACH DNA AMP PROBE: CPT | Performed by: STUDENT IN AN ORGANIZED HEALTH CARE EDUCATION/TRAINING PROGRAM

## 2024-02-15 PROCEDURE — 80048 BASIC METABOLIC PNL TOTAL CA: CPT | Performed by: STUDENT IN AN ORGANIZED HEALTH CARE EDUCATION/TRAINING PROGRAM

## 2024-02-15 PROCEDURE — 99214 OFFICE O/P EST MOD 30 MIN: CPT | Mod: 25 | Performed by: STUDENT IN AN ORGANIZED HEALTH CARE EDUCATION/TRAINING PROGRAM

## 2024-02-15 PROCEDURE — 86706 HEP B SURFACE ANTIBODY: CPT | Performed by: STUDENT IN AN ORGANIZED HEALTH CARE EDUCATION/TRAINING PROGRAM

## 2024-02-15 PROCEDURE — 87389 HIV-1 AG W/HIV-1&-2 AB AG IA: CPT | Performed by: STUDENT IN AN ORGANIZED HEALTH CARE EDUCATION/TRAINING PROGRAM

## 2024-02-15 NOTE — PATIENT INSTRUCTIONS
Preventive Care Advice   This is general advice given by our system to help you stay healthy. However, your care team may have specific advice just for you. Please talk to your care team about your preventive care needs.  Nutrition  Eat 5 or more servings of fruits and vegetables each day.  Try wheat bread, brown rice and whole grain pasta (instead of white bread, rice, and pasta).  Get enough calcium and vitamin D. Check the label on foods and aim for 100% of the RDA (recommended daily allowance).  Lifestyle  Exercise at least 150 minutes each week  (30 minutes a day, 5 days a week).  Do muscle strengthening activities 2 days a week. These help control your weight and prevent disease.  No smoking.  Wear sunscreen to prevent skin cancer.  Have a dental exam and cleaning every 6 months.  Yearly exams  See your health care team every year to talk about:  Any changes in your health.  Any medicines your care team has prescribed.  Preventive care, family planning, and ways to prevent chronic diseases.  Shots (vaccines)   HPV shots (up to age 26), if you've never had them before.  Hepatitis B shots (up to age 59), if you've never had them before.  COVID-19 shot: Get this shot when it's due.  Flu shot: Get a flu shot every year.  Tetanus shot: Get a tetanus shot every 10 years.  Pneumococcal, hepatitis A, and RSV shots: Ask your care team if you need these based on your risk.  Shingles shot (for age 50 and up)  General health tests  Diabetes screening:  Starting at age 35, Get screened for diabetes at least every 3 years.  If you are younger than age 35, ask your care team if you should be screened for diabetes.  Cholesterol test: At age 39, start having a cholesterol test every 5 years, or more often if advised.  Bone density scan (DEXA): At age 50, ask your care team if you should have this scan for osteoporosis (brittle bones).  Hepatitis C: Get tested at least once in your life.  STIs (sexually transmitted  infections)  Before age 24: Ask your care team if you should be screened for STIs.  After age 24: Get screened for STIs if you're at risk. You are at risk for STIs (including HIV) if:  You are sexually active with more than one person.  You don't use condoms every time.  You or a partner was diagnosed with a sexually transmitted infection.  If you are at risk for HIV, ask about PrEP medicine to prevent HIV.  Get tested for HIV at least once in your life, whether you are at risk for HIV or not.  Cancer screening tests  Cervical cancer screening: If you have a cervix, begin getting regular cervical cancer screening tests starting at age 21.  Breast cancer scan (mammogram): If you've ever had breasts, begin having regular mammograms starting at age 40. This is a scan to check for breast cancer.  Colon cancer screening: It is important to start screening for colon cancer at age 45.  Have a colonoscopy test every 10 years (or more often if you're at risk) Or, ask your provider about stool tests like a FIT test every year or Cologuard test every 3 years.  To learn more about your testing options, visit:   https://www.ICONIC/020123.pdf.  For help making a decision, visit:   https://bit.ly/kh05328.  Prostate cancer screening test: If you have a prostate, ask your care team if a prostate cancer screening test (PSA) at age 55 is right for you.  Lung cancer screening: If you are a current or former smoker ages 50 to 80, ask your care team if ongoing lung cancer screenings are right for you.  For informational purposes only. Not to replace the advice of your health care provider. Copyright   2023 [facility]. All rights reserved. Clinically reviewed by the [facility]. [employer] 200553 - REV 01/24.    Learning About Stress  What is stress?     Stress is your body's response to a hard situation. Your body can have a physical, emotional, or mental response. Stress is a fact of life for  most people, and it affects everyone differently. What causes stress for you may not be stressful for someone else.  A lot of things can cause stress. You may feel stress when you go on a job interview, take a test, or run a race. This kind of short-term stress is normal and even useful. It can help you if you need to work hard or react quickly. For example, stress can help you finish an important job on time.  Long-term stress is caused by ongoing stressful situations or events. Examples of long-term stress include long-term health problems, ongoing problems at work, or conflicts in your family. Long-term stress can harm your health.  How does stress affect your health?  When you are stressed, your body responds as though you are in danger. It makes hormones that speed up your heart, make you breathe faster, and give you a burst of energy. This is called the fight-or-flight stress response. If the stress is over quickly, your body goes back to normal and no harm is done.  But if stress happens too often or lasts too long, it can have bad effects. Long-term stress can make you more likely to get sick, and it can make symptoms of some diseases worse. If you tense up when you are stressed, you may develop neck, shoulder, or low back pain. Stress is linked to high blood pressure and heart disease.  Stress also harms your emotional health. It can make you shah, tense, or depressed. Your relationships may suffer, and you may not do well at work or school.  What can you do to manage stress?  You can try these things to help manage stress:   Do something active. Exercise or activity can help reduce stress. Walking is a great way to get started. Even everyday activities such as housecleaning or yard work can help.  Try yoga or jessica chi. These techniques combine exercise and meditation. You may need some training at first to learn them.  Do something you enjoy. For example, listen to music or go to a movie. Practice your  "hobby or do volunteer work.  Meditate. This can help you relax, because you are not worrying about what happened before or what may happen in the future.  Do guided imagery. Imagine yourself in any setting that helps you feel calm. You can use online videos, books, or a teacher to guide you.  Do breathing exercises. For example:  From a standing position, bend forward from the waist with your knees slightly bent. Let your arms dangle close to the floor.  Breathe in slowly and deeply as you return to a standing position. Roll up slowly and lift your head last.  Hold your breath for just a few seconds in the standing position.  Breathe out slowly and bend forward from the waist.  Let your feelings out. Talk, laugh, cry, and express anger when you need to. Talking with supportive friends or family, a counselor, or a deangelo leader about your feelings is a healthy way to relieve stress. Avoid discussing your feelings with people who make you feel worse.  Write. It may help to write about things that are bothering you. This helps you find out how much stress you feel and what is causing it. When you know this, you can find better ways to cope.  What can you do to prevent stress?  You might try some of these things to help prevent stress:  Manage your time. This helps you find time to do the things you want and need to do.  Get enough sleep. Your body recovers from the stresses of the day while you are sleeping.  Get support. Your family, friends, and community can make a difference in how you experience stress.  Limit your news feed. Avoid or limit time on social media or news that may make you feel stressed.  Do something active. Exercise or activity can help reduce stress. Walking is a great way to get started.  Where can you learn more?  Go to https://www.healthwise.net/patiented  Enter N032 in the search box to learn more about \"Learning About Stress.\"  Current as of: February 26, 2023               Content Version: " 13.8    5143-9138 [employer].   Care instructions adapted under license by your healthcare professional. If you have questions about a medical condition or this instruction, always ask your healthcare professional. [employer] disclaims any warranty or liability for your use of this information.      Safer Sex: Care Instructions  Overview  Safer sex is a way to reduce your risk of getting a sexually transmitted infection (STI). It can also help prevent pregnancy.  Several products can help you practice safer sex and reduce your chance of STIs. One of the best is a condom. There are internal and external condoms. You can use a special rubber sheet (dental dam) for protection during oral sex. Disposable gloves can keep your hands from touching blood, semen, or other body fluids that can carry infections.  Remember that birth control methods such as diaphragms, IUDs, foams, and birth control pills do not stop you from getting STIs.  Follow-up care is a key part of your treatment and safety. Be sure to make and go to all appointments, and call your doctor if you are having problems. It's also a good idea to know your test results and keep a list of the medicines you take.  How can you care for yourself at home?  Think about getting vaccinated to help prevent hepatitis A, hepatitis B, and human papillomavirus (HPV). They can be spread through sex.  Use a condom every time you have sex. Use an external condom, which goes on the penis. Or use an internal condom, which goes into the vagina or anus.  Make sure you use the right size external condom. A condom that's too small can break easily. A condom that's too big can slip off during sex.  Use a new condom each time you have sex. Be careful not to poke a hole in the condom when you open the wrapper.  Don't use an internal condom and an external condom at the same time.  Never use petroleum jelly (such as Vaseline), grease, hand lotion, baby oil,  "or anything with oil in it. These products can make holes in the condom.  After intercourse, hold the edge of the condom as you remove it. This will help keep semen from spilling out of the condom.  Do not have sex with anyone who has symptoms of an STI, such as sores on the genitals or mouth.  Do not drink a lot of alcohol or use drugs before sex.  Limit your sex partners. Sex with one partner who has sex only with you can reduce your risk of getting an STI.  Don't share sex toys. But if you do share them, use a condom and clean the sex toys between each use.  Talk to your partner(s) before you have sex. Talk about what you feel comfortable with and whether you have any boundaries with sex. And find out if your partner(s) may be at risk for any STI. Keep in mind that a person may be able to spread an STI even if they do not have symptoms. You and your partner(s) may want to get tested for STIs.  Where can you learn more?  Go to https://www.[employer].net/patiented  Enter B608 in the search box to learn more about \"Safer Sex: Care Instructions.\"  Current as of: April 19, 2023               Content Version: 13.8    8427-1100 [employer].   Care instructions adapted under license by your healthcare professional. If you have questions about a medical condition or this instruction, always ask your healthcare professional. [employer] disclaims any warranty or liability for your use of this information.      "

## 2024-02-15 NOTE — PROGRESS NOTES
Preventive Care Visit  [facility]  Romelia Spann MD, Family Medicine  Feb 15, 2024       SUBJECTIVE:   Anish is a 44 year old, presenting for the following:  Physical (Lump between right ear and jaw, pt stated she is taking magnesium supplements over the counter )        2/15/2024    12:58 PM   Additional Questions   Roomed by    Accompanied by self     HPI    lump on right side jaw line near ear  3-4 months, TTP, smaller now.     blood pressure  Pt has been monitoring BP, since early 2023, BP seems high. Ambulatory Bps, diastolic low 90s, left OK.   R arm 133/91  L 133/89    Recurring chest pain pinching or spaz  - chronic intermittent since 2017  - usually left sided, a couple times on the right  - pinching pain, comes and goes, even during episodes it comes  - doesn't think its heartburn.   - feels like its deep and no TTP  - can last for 4-5 days   - no known triggers  - happens before eating, after eating, at night, when waking up  -CTA angiogram coronary 8/27/2020 with calcium score of 0.  EKG was sinus rhythm at the time as well.  - helps to take deep breaths.    Creatinine slightly higher than baseline 1.16, baseline 1.054 months ago.  GFR in the 60s since 6 months ago.   Pt was dehydrated that day.     UA 9/14/2023 with 2-5 RBC.  Had trace leukocyte esterace, didn't have infection. Didn't have period that month.   LMP: 2/7/24 - completed.     Today's PHQ-2 Score:       2/14/2024     3:15 PM   PHQ-2 ( 1999 [employer])   Q1: Little interest or pleasure in doing things 0   Q2: Feeling down, depressed or hopeless 0   PHQ-2 Score 0   Q1: Little interest or pleasure in doing things Not at all   Q2: Feeling down, depressed or hopeless Not at all   PHQ-2 Score 0         Have you ever done Advance Care Planning? (For example, a Health Directive, POLST, or a discussion with a medical provider or your loved ones about your wishes): No, advance care planning information given to patient to review.   "Patient plans to discuss their wishes with loved ones or provider.      Social History     Tobacco Use    Smoking status: Former     Types: Cigarettes     Quit date: 2009     Years since quitting: 15.1    Smokeless tobacco: Former     Quit date: 2014    Tobacco comments:     No exposure to second hand smoking.   Substance Use Topics    Alcohol use: No             2024     3:15 PM   Alcohol Use   Prescreen: >3 drinks/day or >7 drinks/week? No     Reviewed orders with patient.  Reviewed health maintenance and updated orders accordingly - Yes  Lab work is in process  Labs reviewed in EPIC    Breast Cancer Screenin/23/2022     1:08 PM 2024     3:24 PM   Breast CA Risk Assessment (FHS-7)   Do you have a family history of breast, colon, or ovarian cancer? No / Unknown No / Unknown         Mammogram Screening - Mammogram every 1-2 years updated in Health Maintenance based on mutual decision making  Pertinent mammograms are reviewed under the imaging tab.    History of abnormal Pap smear: NO - age 30-65 PAP every 5 years with negative HPV co-testing recommended      Latest Ref Rng & Units 2020    10:02 AM 3/27/2015    10:40 AM   PAP / HPV   PAP Negative for squamous intraepithelial lesion or malignancy. Negative for squamous intraepithelial lesion or malignancy  Electronically signed by Vanessa Campbell CT (ASCP) on 3/2/2020 at 10:33 AM    Negative for squamous intraepithelial lesion or malignancy  Electronically signed by Gabbie Mckeon CT (ASCP) on 2015 at  2:15 PM      HPV 16 DNA NEG Negative     HPV 18 DNA NEG Negative     Other HR HPV NEG Negative       Reviewed and updated as needed this visit by clinical staff   Tobacco  Allergies  Meds              Reviewed and updated as needed this visit by Provider     Meds                  OBJECTIVE:   BP (!) 133/91   Pulse 69   Temp 97.6  F (36.4  C) (Temporal)   Resp 16   Ht 1.505 m (4' 11.25\")   Wt 91.6 kg (202 lb)   LMP " "02/07/2024   SpO2 96%   BMI 40.45 kg/m     Estimated body mass index is 40.45 kg/m  as calculated from the following:    Height as of this encounter: 1.505 m (4' 11.25\").    Weight as of this encounter: 91.6 kg (202 lb).  Physical Exam  General Appearance:  Alert, cooperative, no distress, appears stated age.  Head:  Normocephalic, without obvious abnormality, atraumatic.  Eyes:  Conjunctivae/corneas clear, extraocular movements intact both eyes.  Lungs:  Clear to auscultation bilaterally, respirations unlabored.  Heart:  Regular rate and rhythm, S1 and S2 normal, no murmur, rub or gallop.  Abdomen:  Soft, non-tender, bowel sounds active all four quadrants.   Extremities:  Atraumatic, no cyanosis or edema.  Skin:  Skin color, texture, turgor normal, no rashes or lesions.  Neurologic: No focal deficits.          ASSESSMENT/PLAN:   Anish was seen today for physical.    Diagnoses and all orders for this visit:    Routine general medical examination at a health care facility  -     PRIMARY CARE FOLLOW-UP SCHEDULING; Future  -     REVIEW OF HEALTH MAINTENANCE PROTOCOL ORDERS    Visit for screening mammogram  -     MA SCREENING DIGITAL BILAT - Future  (s+30); Future    Elevated BP without diagnosis of hypertension  Comments:  Ranges normal up to 130s/90s. Prehypertension range today. Asymptomatie. Discussed lifestyle modifications. RTC in 1 month. Compare R and L.    Neck mass  Comments:  Swollen lymph node, Likely reactive. Monitor.    Chest pain, unspecified type  Comments:  Chronic, intermittent. Non exertional. CTA 2020 normal. Will try NSAID or Tums for possible MSK or GERD.    Elevated serum creatinine  Comments:  Recheck today.  Orders:  -     Basic metabolic panel  (Ca, Cl, CO2, Creat, Gluc, K, Na, BUN); Future    Screen for STD (sexually transmitted disease)  -     HIV Antigen Antibody Combo Cascade; Future  -     Treponema Abs w Reflex to RPR and Titer; Future  -     Chlamydia trachomatis/Neisseria gonorrhoeae " by PCR  -     Hepatitis C antibody; Future  -     Hepatitis B Surface Antibody; Future          Patient has been advised of split billing requirements and indicates understanding: Yes      Counseling  Reviewed preventive health counseling, as reflected in patient instructions        She reports that she quit smoking about 15 years ago. Her smoking use included cigarettes. She quit smokeless tobacco use about 9 years ago.          Signed Electronically by: Romelia Spann MD  Prior to immunization administration, verified patients identity using patient s name and date of birth. Please see Immunization Activity for additional information.     Screening Questionnaire for Adult Immunization    Are you sick today?   No   Do you have allergies to medications, food, a vaccine component or latex?   No   Have you ever had a serious reaction after receiving a vaccination?   No   Do you have a long-term health problem with heart, lung, kidney, or metabolic disease (e.g., diabetes), asthma, a blood disorder, no spleen, complement component deficiency, a cochlear implant, or a spinal fluid leak?  Are you on long-term aspirin therapy?   No   Do you have cancer, leukemia, HIV/AIDS, or any other immune system problem?   No   Do you have a parent, brother, or sister with an immune system problem?   No   In the past 3 months, have you taken medications that affect  your immune system, such as prednisone, other steroids, or anticancer drugs; drugs for the treatment of rheumatoid arthritis, Crohn s disease, or psoriasis; or have you had radiation treatments?   No   Have you had a seizure, or a brain or other nervous system problem?   No   During the past year, have you received a transfusion of blood or blood    products, or been given immune (gamma) globulin or antiviral drug?   No   For women: Are you pregnant or is there a chance you could become       pregnant during the next month?   No   Have you received any vaccinations in the past  4 weeks?   No     Immunization questionnaire answers were all negative.      Patient instructed to remain in clinic for 15 minutes afterwards, and to report any adverse reactions.     Screening performed by Dany Yanez MA on 2/15/2024 at 1:04 PM.

## 2024-02-15 NOTE — PROGRESS NOTES
lump on right side jaw line near ear    blood pressure    recurring chest pain pinching or spaz  -CTA angiogram coronary 8/27/2020 with calcium score of 0.  EKG was sinus rhythm at the time as well.    Creatinine slightly higher than baseline 1.16, baseline 1.054 months ago.  GFR in the 60s since 6 months ago.     UA 9/14/2023 with 2-5 RBC.  Had trace leukocyte esterace, could be infection.

## 2024-02-16 LAB
ANION GAP SERPL CALCULATED.3IONS-SCNC: 9 MMOL/L (ref 7–15)
BUN SERPL-MCNC: 13.6 MG/DL (ref 6–20)
C TRACH DNA SPEC QL PROBE+SIG AMP: NEGATIVE
CALCIUM SERPL-MCNC: 9 MG/DL (ref 8.6–10)
CHLORIDE SERPL-SCNC: 103 MMOL/L (ref 98–107)
CREAT SERPL-MCNC: 0.85 MG/DL (ref 0.51–0.95)
DEPRECATED HCO3 PLAS-SCNC: 26 MMOL/L (ref 22–29)
EGFRCR SERPLBLD CKD-EPI 2021: 87 ML/MIN/1.73M2
GLUCOSE SERPL-MCNC: 99 MG/DL (ref 70–99)
HBV SURFACE AB SERPL IA-ACNC: 220 M[IU]/ML
HBV SURFACE AB SERPL IA-ACNC: REACTIVE M[IU]/ML
HCV AB SERPL QL IA: NONREACTIVE
HIV 1+2 AB+HIV1 P24 AG SERPL QL IA: NONREACTIVE
N GONORRHOEA DNA SPEC QL NAA+PROBE: NEGATIVE
POTASSIUM SERPL-SCNC: 4.2 MMOL/L (ref 3.4–5.3)
SODIUM SERPL-SCNC: 138 MMOL/L (ref 135–145)
T PALLIDUM AB SER QL: NONREACTIVE

## 2024-02-20 ENCOUNTER — VIRTUAL VISIT (OUTPATIENT)
Dept: ENDOCRINOLOGY | Facility: CLINIC | Age: 44
End: 2024-02-20
Payer: COMMERCIAL

## 2024-02-20 DIAGNOSIS — E06.3 HYPOTHYROIDISM DUE TO HASHIMOTO'S THYROIDITIS: Primary | ICD-10-CM

## 2024-02-20 DIAGNOSIS — E55.9 VITAMIN D DEFICIENCY: ICD-10-CM

## 2024-02-20 PROCEDURE — 99214 OFFICE O/P EST MOD 30 MIN: CPT | Mod: 95 | Performed by: NURSE PRACTITIONER

## 2024-02-20 NOTE — PROGRESS NOTES
[facility] ENDOCRINOLOGY    Thyroid Note  2/20/2024    Anish Hauser, 1980, [id number]          Reason for visit      1. Hypothyroidism due to Hashimoto's thyroiditis    2. Vitamin D deficiency        HPI     Anish Hauser is a very pleasant 44 year old old female who presents for follow up.  SUMMARY:    Anish is seen in follow-up for Hypothyroidism. Current TSH is 0.72 and fT4 is 1.31. She is taking Levothyroxine, 75 mcg daily consistently. She is having no problems referable to her neck at present. Vit D level is 31. She is taking 5000 international unit(s) daily    Past Medical History     Patient Active Problem List   Diagnosis    Adjustment disorder with mixed anxiety and depressed mood    Morbid obesity (H)    Hypothyroidism due to Hashimoto's thyroiditis    Hidradenitis    Prediabetes    Iron deficiency anemia due to chronic blood loss    Family history of kidney disease    Elevated serum creatinine    Elevated blood-pressure reading, without diagnosis of hypertension    Vitamin D deficiency       Family History       family history includes Diabetes in her mother; Hepatitis in her maternal grandmother; Hyperlipidemia in her father and mother; Hypertension in her brother, maternal grandmother, and mother; IgA nephropathy in her brother and daughter; Liver Disease in her brother; No Known Problems in her sister, sister, sister, sister, and son; Uterine Cancer in her mother.    Social History      reports that she quit smoking about 15 years ago. Her smoking use included cigarettes. She quit smokeless tobacco use about 9 years ago. She reports that she does not drink alcohol and does not use drugs.      Review of Systems     Patient denies fatigue, weight changes, heat/cold intolerance, bowel/skin changes or CVS symptoms.   Remainder per HPI and per attached intake form.      Vital Signs     LMP 02/07/2024   Wt Readings from Last 3 Encounters:   02/15/24 91.6 kg (202 lb)   10/04/23 91.2 kg (201 lb)  "  09/14/23 93.4 kg (206 lb)       Physical Exam     Constitutional:  Well developed, Well nourished  HENT:  Normocephalic,   Neck: normal in appearance  Eyes:  PERRL, Conjunctiva pink  Respiratory:  No respiratory distress  Skin: No acanthosis nigricans, lipoatrophy or lipodystrophy  Neurologic:  Alert & oriented x 3, nonfocal  Psychiatric:  Affect, Mood, Insight appropriate        Assessment     1. Hypothyroidism due to Hashimoto's thyroiditis    2. Vitamin D deficiency            Plan     She is bio-chemically and physically euthyroid. She will remain on her current dose of Levothyroxine.     Vit D is in normal range.     Kathy Brown NP   Endocrinology  2/20/2024  10:52 AM    Lab Results     TSH   Date Value Ref Range Status   02/12/2024 0.72 0.30 - 4.20 uIU/mL Final   02/11/2022 2.09 0.30 - 5.00 uIU/mL Final     No components found for: \"THYROIDAB\"    No results found for: \"Q0KVBHM\"    Imaging Results   Last thyroid ultrasound:  No results found for this or any previous visit.      Last thyroid nuclear scan:  No results found for this or any previous visit.      Current Medications     Outpatient Medications Prior to Visit   Medication Sig Dispense Refill    ferrous gluconate (FERGON) 324 (38 Fe) MG tablet Take iron pill once daily with food for 7 days/month during her period 21 tablet 4    levothyroxine (SYNTHROID/LEVOTHROID) 75 MCG tablet Take 1 tablet (75 mcg) by mouth daily 90 tablet 3    Vitamin D3 (CHOLECALCIFEROL) 125 MCG (5000 UT) tablet Take 1 tablet by mouth daily       No facility-administered medications prior to visit.           Virtual Visit Details    Type of service:  Video Visit     Originating Location (pt. Location): Home    Distant Location (provider location):  On-site  Platform used for Video Visit: Emiliano"

## 2024-02-20 NOTE — LETTER
2/20/2024         RE: Anish Hauser  [address]  [place] [address]        Dear Colleague,    Thank you for referring your patient, Anish Hauser, to the [facility]. Please see a copy of my visit note below.    [facility] ENDOCRINOLOGY    Thyroid Note  2/20/2024    Anish Hauser, 1980, [id number]          Reason for visit      1. Hypothyroidism due to Hashimoto's thyroiditis    2. Vitamin D deficiency        HPI     Anish Hauser is a very pleasant 44 year old old female who presents for follow up.  SUMMARY:    Anish is seen in follow-up for Hypothyroidism. Current TSH is 0.72 and fT4 is 1.31. She is taking Levothyroxine, 75 mcg daily consistently. She is having no problems referable to her neck at present. Vit D level is 31. She is taking 5000 international unit(s) daily    Past Medical History     Patient Active Problem List   Diagnosis     Adjustment disorder with mixed anxiety and depressed mood     Morbid obesity (H)     Hypothyroidism due to Hashimoto's thyroiditis     Hidradenitis     Prediabetes     Iron deficiency anemia due to chronic blood loss     Family history of kidney disease     Elevated serum creatinine     Elevated blood-pressure reading, without diagnosis of hypertension     Vitamin D deficiency       Family History       family history includes Diabetes in her mother; Hepatitis in her maternal grandmother; Hyperlipidemia in her father and mother; Hypertension in her brother, maternal grandmother, and mother; IgA nephropathy in her brother and daughter; Liver Disease in her brother; No Known Problems in her sister, sister, sister, sister, and son; Uterine Cancer in her mother.    Social History      reports that she quit smoking about 15 years ago. Her smoking use included cigarettes. She quit smokeless tobacco use about 9 years ago. She reports that she does not drink alcohol and does not use drugs.      Review of Systems     Patient denies fatigue, weight changes,  "heat/cold intolerance, bowel/skin changes or CVS symptoms.   Remainder per HPI and per attached intake form.      Vital Signs     LMP 02/07/2024   Wt Readings from Last 3 Encounters:   02/15/24 91.6 kg (202 lb)   10/04/23 91.2 kg (201 lb)   09/14/23 93.4 kg (206 lb)       Physical Exam     Constitutional:  Well developed, Well nourished  HENT:  Normocephalic,   Neck: normal in appearance  Eyes:  PERRL, Conjunctiva pink  Respiratory:  No respiratory distress  Skin: No acanthosis nigricans, lipoatrophy or lipodystrophy  Neurologic:  Alert & oriented x 3, nonfocal  Psychiatric:  Affect, Mood, Insight appropriate        Assessment     1. Hypothyroidism due to Hashimoto's thyroiditis    2. Vitamin D deficiency            Plan     She is bio-chemically and physically euthyroid. She will remain on her current dose of Levothyroxine.     Vit D is in normal range.     Kathy Brown NP   Endocrinology  2/20/2024  10:52 AM    Lab Results     TSH   Date Value Ref Range Status   02/12/2024 0.72 0.30 - 4.20 uIU/mL Final   02/11/2022 2.09 0.30 - 5.00 uIU/mL Final     No components found for: \"THYROIDAB\"    No results found for: \"Z4YYWZR\"    Imaging Results   Last thyroid ultrasound:  No results found for this or any previous visit.      Last thyroid nuclear scan:  No results found for this or any previous visit.      Current Medications     Outpatient Medications Prior to Visit   Medication Sig Dispense Refill     ferrous gluconate (FERGON) 324 (38 Fe) MG tablet Take iron pill once daily with food for 7 days/month during her period 21 tablet 4     levothyroxine (SYNTHROID/LEVOTHROID) 75 MCG tablet Take 1 tablet (75 mcg) by mouth daily 90 tablet 3     Vitamin D3 (CHOLECALCIFEROL) 125 MCG (5000 UT) tablet Take 1 tablet by mouth daily       No facility-administered medications prior to visit.           Virtual Visit Details    Type of service:  Video Visit     Originating Location (pt. Location): Home    Distant Location (provider " location):  On-site  Platform used for Video Visit: AmWell      Again, thank you for allowing me to participate in the care of your patient.        Sincerely,        Kathy Brown NP

## 2024-02-24 ENCOUNTER — APPOINTMENT (OUTPATIENT)
Dept: ULTRASOUND IMAGING | Facility: HOSPITAL | Age: 44
End: 2024-02-24
Attending: EMERGENCY MEDICINE
Payer: COMMERCIAL

## 2024-02-24 ENCOUNTER — HOSPITAL ENCOUNTER (EMERGENCY)
Facility: HOSPITAL | Age: 44
Discharge: HOME | End: 2024-02-24
Attending: EMERGENCY MEDICINE | Admitting: EMERGENCY MEDICINE
Payer: COMMERCIAL

## 2024-02-24 VITALS
RESPIRATION RATE: 18 BRPM | WEIGHT: 207 LBS | SYSTOLIC BLOOD PRESSURE: 159 MMHG | HEART RATE: 84 BPM | BODY MASS INDEX: 41.45 KG/M2 | TEMPERATURE: 99 F | OXYGEN SATURATION: 99 % | DIASTOLIC BLOOD PRESSURE: 110 MMHG

## 2024-02-24 DIAGNOSIS — I87.8 VENOUS STASIS OF LOWER EXTREMITY: ICD-10-CM

## 2024-02-24 PROCEDURE — 99284 EMERGENCY DEPT VISIT MOD MDM: CPT

## 2024-02-24 PROCEDURE — 93971 EXTREMITY STUDY: CPT | Mod: LT

## 2024-02-24 ASSESSMENT — ACTIVITIES OF DAILY LIVING (ADL)
ADLS_ACUITY_SCORE: 35
ADLS_ACUITY_SCORE: 33

## 2024-02-24 ASSESSMENT — COLUMBIA-SUICIDE SEVERITY RATING SCALE - C-SSRS
2. HAVE YOU ACTUALLY HAD ANY THOUGHTS OF KILLING YOURSELF IN THE PAST MONTH?: NO
1. IN THE PAST MONTH, HAVE YOU WISHED YOU WERE DEAD OR WISHED YOU COULD GO TO SLEEP AND NOT WAKE UP?: NO
6. HAVE YOU EVER DONE ANYTHING, STARTED TO DO ANYTHING, OR PREPARED TO DO ANYTHING TO END YOUR LIFE?: NO

## 2024-02-24 ASSESSMENT — ENCOUNTER SYMPTOMS: ARTHRALGIAS: 0

## 2024-02-24 NOTE — ED PROVIDER NOTES
NAME: Anish Hauser  AGE: 44 year old female  YOB: 1980  MRN: [id number]  EVALUATION DATE & TIME: 2024  4:07 AM    PCP: Bibi Colunga    ED PROVIDER: Danny Jarrett M.D.      Chief Complaint   Patient presents with    Leg Problem     FINAL IMPRESSION:  1. Venous stasis of lower extremity      MEDICAL DECISION MAKIN:31 AM Patient was clinically assessed and consented to treatment. After assessment, medical decision making and workup were discussed with the patient. The patient was agreeable to plan for testing, workup, and treatment.  Pertinent Labs & Imaging studies reviewed. (See chart for details)       Medical Decision Making  Obtained supplemental history:Supplemental history obtained?: Documented in chart  Reviewed external records: External records reviewed?: Documented in chart  Care impacted by chronic illness:Other: Hypothyroidism  Care significantly affected by social determinants of health:Access to Medical Care  Did you consider but not order tests?: Work up considered but not performed and documented in chart, if applicable  Did you interpret images independently?: Independent interpretation of ECG and images noted in documentation, when applicable.  Consultation discussion with other provider:Did you involve another provider (consultant, , pharmacy, etc.)?: No  Discharge. No recommendations on prescription strength medication(s). See documentation for any additional details.    Anish Hauser is a 44 year old female who presents with right leg problem.   Differential diagnosis includes but not limited to DVT, cellulitis, venous stasis, peripheral edema.  Patient presenting for right leg redness.  Patient does have some prominence of vascular findings on the posterior right calf mainly in the lower aspect.  She does have some trace bilateral lower extremity edema.  There is no tenderness there or significant findings of erythema.  No open wounds.  Lower suspicion for cellulitis  and main concern would be for blood clot.  Patient reports this started after she had had her legs crossed and could just be venous stasis after possibly having her legs crossed for prolonged period resulting in some vascular backup.  Ultrasound was done which did not reveal any DVT and patient was reassured.  Patient without pain and at this time I would recommend follow-up with her primary doctor for possible repeat ultrasound if still noticing symptoms or any worsening symptoms return to the ER.  Patient comfortable with plan will be discharged.    0 minutes of critical care time    MEDICATIONS GIVEN IN THE EMERGENCY:  Medications - No data to display    NEW PRESCRIPTIONS STARTED AT TODAY'S ER VISIT:  Discharge Medication List as of 2/24/2024  6:30 AM             =================================================================    HPI    Patient information was obtained from: The patient    Use of : N/A       Anish Hauser is a 44 year old female with a past medical history of prediabetes, who presents to the ER via walk-in for an evaluation of leg problem.    The patient reports she noticed some red marks to the left lower calf. She also reports having left-sided knee pain 2 days ago when she crossed her legs while sitting, but she no longer has any pain to her lower extremities/knee. She denies any personal history of blood clots. She reports her blood pressure has been running high as of recent.     Otherwise, the patient is healthy and denies any other medical complaints or concerns at this time.      REVIEW OF SYSTEMS   Review of Systems   Musculoskeletal:  Negative for arthralgias.   Skin:  Positive for color change.        Positive for red marking to her left lower calf   All other systems reviewed and are negative.       PAST MEDICAL HISTORY:  Past Medical History:   Diagnosis Date    Head injury, closed, sequela 1/16/2018    Heartburn     History of angina     History of blood transfusion     Iron  deficiency anemia due to chronic blood loss 11/25/2022    Motion sickness     Seasonal allergies     Thyroid disease        PAST SURGICAL HISTORY:  Past Surgical History:   Procedure Laterality Date    BREAST CYST INCISION AND DRAINAGE Left     sebacous cyst, Dr. Sidhu    COLONOSCOPY N/A 2/24/2023    Procedure: COLONOSCOPY;  Surgeon: Montana Tadeo MD;  Location: [facility]    LAPAROSCOPIC CHOLECYSTECTOMY N/A 1/25/2022    Procedure: CHOLECYSTECTOMY, LAPAROSCOPIC;  Surgeon: Julissa Mcmullen DO;  Location: [facility]       CURRENT MEDICATIONS:    No current facility-administered medications for this encounter.    Current Outpatient Medications:     ferrous gluconate (FERGON) 324 (38 Fe) MG tablet, Take iron pill once daily with food for 7 days/month during her period, Disp: 21 tablet, Rfl: 4    levothyroxine (SYNTHROID/LEVOTHROID) 75 MCG tablet, Take 1 tablet (75 mcg) by mouth daily, Disp: 90 tablet, Rfl: 3    Vitamin D3 (CHOLECALCIFEROL) 125 MCG (5000 UT) tablet, Take 1 tablet by mouth daily, Disp: , Rfl:     ALLERGIES:  Allergies   Allergen Reactions    Dust Mite Extract Hives    Grass Extracts [Gramineae Pollens] Hives    Shellfish Allergy Hives       FAMILY HISTORY:  Family History   Problem Relation Age of Onset    Diabetes Mother     Hypertension Mother     Hyperlipidemia Mother     Uterine Cancer Mother         pre cancer    Hyperlipidemia Father     No Known Problems Sister     No Known Problems Sister     No Known Problems Sister     No Known Problems Sister     Liver Disease Brother         fatty liver    IgA nephropathy Brother         on dialysis    Hypertension Brother     Hepatitis Maternal Grandmother     Hypertension Maternal Grandmother     IgA nephropathy Daughter     No Known Problems Son     Coronary Artery Disease No family hx of     Breast Cancer No family hx of     Cancer - colorectal No family hx of     Ovarian Cancer No family hx of     Prostate Cancer No family hx of     Other Cancer No  family hx of     Mental Illness No family hx of     Cerebrovascular Disease No family hx of     Anesthesia Reaction No family hx of     Asthma No family hx of     Osteoporosis No family hx of     Known Genetic Syndrome No family hx of     Obesity No family hx of     Unknown/Adopted No family hx of        SOCIAL HISTORY:   Social History     Socioeconomic History    Marital status: Single   Tobacco Use    Smoking status: Former     Types: Cigarettes     Quit date: 2009     Years since quitting: 15.1    Smokeless tobacco: Former     Quit date: 8/12/2014    Tobacco comments:     No exposure to second hand smoking.   Vaping Use    Vaping Use: Never used   Substance and Sexual Activity    Alcohol use: No    Drug use: No    Sexual activity: Not Currently     Birth control/protection: None     Comment: Does not have a partner at the moment   Social History Narrative    ; 4 children - (23, 21, 17, 14)       Social Determinants of Health     Financial Resource Strain: Low Risk  (2/14/2024)    Financial Resource Strain     Within the past 12 months, have you or your family members you live with been unable to get utilities (heat, electricity) when it was really needed?: No   Food Insecurity: Low Risk  (2/14/2024)    Food Insecurity     Within the past 12 months, did you worry that your food would run out before you got money to buy more?: No     Within the past 12 months, did the food you bought just not last and you didn t have money to get more?: No   Transportation Needs: Low Risk  (2/14/2024)    Transportation Needs     Within the past 12 months, has lack of transportation kept you from medical appointments, getting your medicines, non-medical meetings or appointments, work, or from getting things that you need?: No   Physical Activity: Insufficiently Active (2/14/2024)    Exercise Vital Sign     Days of Exercise per Week: 3 days     Minutes of Exercise per Session: 30 min   Stress: Stress Concern Present  (2/14/2024)    [employer] [place] [employer] - Occupational Stress Questionnaire     Feeling of Stress : To some extent   Social Connections: Unknown (2/14/2024)    Social Connection and Isolation Panel [NHANES]     Frequency of Social Gatherings with Friends and Family: Once a week   Interpersonal Safety: Low Risk  (2/15/2024)    Interpersonal Safety     Do you feel physically and emotionally safe where you currently live?: Yes     Within the past 12 months, have you been hit, slapped, kicked or otherwise physically hurt by someone?: No     Within the past 12 months, have you been humiliated or emotionally abused in other ways by your partner or ex-partner?: No   Housing Stability: High Risk (2/14/2024)    Housing Stability     Do you have housing? : No     Are you worried about losing your housing?: No       PHYSICAL EXAM:    Vitals: BP (!) 159/110   Pulse 84   Temp 99  F (37.2  C) (Temporal)   Resp 18   Wt 93.9 kg (207 lb)   LMP 02/07/2024   SpO2 99%   BMI 41.45 kg/m     Physical Exam  Vitals and nursing note reviewed.   Constitutional:       General: She is not in acute distress.     Appearance: Normal appearance. She is normal weight.   HENT:      Head: Normocephalic.   Cardiovascular:      Pulses: Normal pulses.   Pulmonary:      Effort: No respiratory distress.   Musculoskeletal:         General: Swelling (Slightly increased on the left.) present. No tenderness.      Right lower leg: No edema.      Left lower leg: No edema.   Skin:     General: Skin is warm and dry.      Capillary Refill: Capillary refill takes less than 2 seconds.      Coloration: Skin is not pale.      Findings: No erythema or rash.      Comments: Left lower extremity with some prominence of vascular congestion and increased pigmentation in the left posterior lower calf and ankle.   Neurological:      Mental Status: She is alert.      Sensory: No sensory deficit.   Psychiatric:         Behavior: Behavior normal.            LAB:  All pertinent labs reviewed and interpreted.  Labs Ordered and Resulted from Time of ED Arrival to Time of ED Departure - No data to display    RADIOLOGY:  US Lower Extremity Venous Duplex Left   Final Result   IMPRESSION:   No deep venous thrombosis in the left lower extremity.          EKG:   None    PROCEDURES:   Procedures       I, Haroon Villa, am serving as a scribe to document services personally performed by Dr. Danny Jarrett  based on my observation and the provider's statements to me. I, Danny Jarrett MD attest that Haroon Villa is acting in a scribe capacity, has observed my performance of the services and has documented them in accordance with my direction.      Danny Jarrett M.D.  Emergency Medicine  [facility] Emergency Department       Danny Jarrett MD  02/25/24 0139

## 2024-02-25 ASSESSMENT — ENCOUNTER SYMPTOMS: COLOR CHANGE: 1

## 2024-04-09 ENCOUNTER — OFFICE VISIT (OUTPATIENT)
Dept: FAMILY MEDICINE | Facility: CLINIC | Age: 44
End: 2024-04-09
Payer: COMMERCIAL

## 2024-04-09 ENCOUNTER — ANCILLARY PROCEDURE (OUTPATIENT)
Dept: MAMMOGRAPHY | Facility: CLINIC | Age: 44
End: 2024-04-09
Attending: FAMILY MEDICINE
Payer: COMMERCIAL

## 2024-04-09 VITALS
RESPIRATION RATE: 20 BRPM | HEART RATE: 72 BPM | BODY MASS INDEX: 41.75 KG/M2 | HEIGHT: 59 IN | SYSTOLIC BLOOD PRESSURE: 138 MMHG | TEMPERATURE: 98.4 F | OXYGEN SATURATION: 97 % | DIASTOLIC BLOOD PRESSURE: 92 MMHG | WEIGHT: 207.1 LBS

## 2024-04-09 DIAGNOSIS — Z12.31 VISIT FOR SCREENING MAMMOGRAM: ICD-10-CM

## 2024-04-09 DIAGNOSIS — L59.0 ERYTHEMA AB IGNE: Primary | ICD-10-CM

## 2024-04-09 PROCEDURE — 99213 OFFICE O/P EST LOW 20 MIN: CPT | Performed by: NURSE PRACTITIONER

## 2024-04-09 PROCEDURE — 77063 BREAST TOMOSYNTHESIS BI: CPT

## 2024-04-09 NOTE — PROGRESS NOTES
"  Assessment & Plan     Erythema ab igne  Unilateral hyperpigmentation on left shin is characteristic of erythema ab igne. Patient advised that this is due to repeated moderate heat exposure; she has moved her space heater. Patient informed that her symptoms should resolve spontaneously over weeks to months; advised to return to care if this does not occur.    BMI  Estimated body mass index is 41.47 kg/m  as calculated from the following:    Height as of this encounter: 1.505 m (4' 11.25\").    Weight as of this encounter: 93.9 kg (207 lb 1.6 oz).   Weight management plan: Discussed healthy diet and exercise guidelines    Subjective   Anish is a 44 year old, presenting for the following health issues:  Leg Problem (Left leg discoloration and trace edema. Uses space heater under her desk at home during work.)      4/9/2024    10:20 AM   Additional Questions   Roomed by IGNACIO Palomares     HPI     Anish is a 43yo woman with PMH hypothyroidism, iron deficiency anemia who presents today with concern for purple marks on the lateral portion of her lower left leg.     She first noticed it two months ago, and presented to the emergency department at that time for concern for blood clots, which were ruled out. She runs this space heater year round because of cold feet, but states that she doesn't wear socks at home. She works from home and reportedly uses a space heater under her desk. It is on the left side of her legs under the desk. She moved the space heater farther away starting yesterday.     She denies pain at the site at rest, but feels some tenderness with palpation. She states that it has gotten bigger and darker since the ED visit in February. She denies numbness/tingling in her lower legs.    She denies any new use of drugs or supplements other than magnesium, which she started about six months ago. She recently flew to [place] and back.    Review of Systems  Constitutional, HEENT, cardiovascular, pulmonary, gi and gu " "systems are negative, except as otherwise noted.      Objective    /88 (BP Location: Right arm, Patient Position: Sitting, Cuff Size: Adult Regular)   Pulse 72   Temp 98.4  F (36.9  C) (Oral)   Resp 20   Ht 1.505 m (4' 11.25\")   Wt 93.9 kg (207 lb 1.6 oz)   LMP 03/07/2024 (Approximate)   SpO2 97%   BMI 41.47 kg/m    Body mass index is 41.47 kg/m .  Physical Exam  Constitutional:       Appearance: Normal appearance.   HENT:      Head: Normocephalic and atraumatic.   Skin:            Comments: Medial hyperpigmentation of left lower leg. No redness, swelling, warmth, tenderness at site   Neurological:      General: No focal deficit present.      Mental Status: She is alert and oriented to person, place, and time.   Psychiatric:         Mood and Affect: Mood normal.         Behavior: Behavior normal.         Thought Content: Thought content normal.         Judgment: Judgment normal.                    SHEYLA Delgadillo, RN, [facility] DNP Student  Signed Electronically by: MARKELL PENALOZA CNP    "

## 2024-04-25 ENCOUNTER — OFFICE VISIT (OUTPATIENT)
Dept: FAMILY MEDICINE | Facility: CLINIC | Age: 44
End: 2024-04-25
Payer: COMMERCIAL

## 2024-04-25 VITALS
DIASTOLIC BLOOD PRESSURE: 96 MMHG | TEMPERATURE: 97.1 F | OXYGEN SATURATION: 99 % | WEIGHT: 202 LBS | RESPIRATION RATE: 16 BRPM | SYSTOLIC BLOOD PRESSURE: 137 MMHG | HEIGHT: 59 IN | HEART RATE: 74 BPM | BODY MASS INDEX: 40.72 KG/M2

## 2024-04-25 DIAGNOSIS — G24.5 EYE TWITCH: ICD-10-CM

## 2024-04-25 DIAGNOSIS — R25.3 MUSCLE TWITCH: ICD-10-CM

## 2024-04-25 DIAGNOSIS — I10 PRIMARY HYPERTENSION: Primary | ICD-10-CM

## 2024-04-25 DIAGNOSIS — R22.1 NECK MASS: ICD-10-CM

## 2024-04-25 DIAGNOSIS — D50.0 IRON DEFICIENCY ANEMIA DUE TO CHRONIC BLOOD LOSS: ICD-10-CM

## 2024-04-25 DIAGNOSIS — R73.03 PREDIABETES: ICD-10-CM

## 2024-04-25 PROBLEM — R79.89 ELEVATED SERUM CREATININE: Status: RESOLVED | Noted: 2023-09-19 | Resolved: 2024-04-25

## 2024-04-25 PROCEDURE — G2211 COMPLEX E/M VISIT ADD ON: HCPCS | Performed by: STUDENT IN AN ORGANIZED HEALTH CARE EDUCATION/TRAINING PROGRAM

## 2024-04-25 PROCEDURE — 99214 OFFICE O/P EST MOD 30 MIN: CPT | Performed by: STUDENT IN AN ORGANIZED HEALTH CARE EDUCATION/TRAINING PROGRAM

## 2024-04-25 RX ORDER — LISINOPRIL 10 MG/1
10 TABLET ORAL DAILY
Qty: 90 TABLET | Refills: 1 | Status: SHIPPED | OUTPATIENT
Start: 2024-04-25 | End: 2024-05-24

## 2024-04-25 NOTE — PROGRESS NOTES
Preventive Care Visit  [facility]  Romelia Spann MD, Family Medicine  Feb 15, 2024         SUBJECTIVE:   Anish is a 44 year old, presenting for the following:  Physical (Lump between right ear and jaw, pt stated she is taking magnesium supplements over the counter )          2/15/2024    12:58 PM   Additional Questions   Roomed by    Accompanied by self      HPI     lump on right side jaw line near ear  3-4 months, TTP, smaller now.      blood pressure  Pt has been monitoring BP, since early 2023, BP seems high. Ambulatory Bps, diastolic low 90s, left OK.   R arm 133/91  L 133/89     Recurring chest pain pinching or spaz  - chronic intermittent since 2017  - usually left sided, a couple times on the right  - pinching pain, comes and goes, even during episodes it comes  - doesn't think its heartburn.   - feels like its deep and no TTP  - can last for 4-5 days   - no known triggers  - happens before eating, after eating, at night, when waking up  -CTA angiogram coronary 8/27/2020 with calcium score of 0.  EKG was sinus rhythm at the time as well.  - helps to take deep breaths.     Creatinine slightly higher than baseline 1.16, baseline 1.054 months ago.  GFR in the 60s since 6 months ago.   Pt was dehydrated that day.      UA 9/14/2023 with 2-5 RBC.  Had trace leukocyte esterace, didn't have infection. Didn't have period that month.   LMP: 2/7/24 - completed.      Today's PHQ-2 Score:        2/14/2024     3:15 PM   PHQ-2 ( 1999 [employer])   Q1: Little interest or pleasure in doing things 0   Q2: Feeling down, depressed or hopeless 0   PHQ-2 Score 0   Q1: Little interest or pleasure in doing things Not at all   Q2: Feeling down, depressed or hopeless Not at all   PHQ-2 Score 0            Have you ever done Advance Care Planning? (For example, a Health Directive, POLST, or a discussion with a medical provider or your loved ones about your wishes): No, advance care planning information given to  patient to review.  Patient plans to discuss their wishes with loved ones or provider.       Social History            Tobacco Use    Smoking status: Former       Types: Cigarettes       Quit date: 2009       Years since quitting: 15.1    Smokeless tobacco: Former       Quit date: 2014    Tobacco comments:       No exposure to second hand smoking.   Substance Use Topics    Alcohol use: No                 2024     3:15 PM   Alcohol Use   Prescreen: >3 drinks/day or >7 drinks/week? No      Reviewed orders with patient.  Reviewed health maintenance and updated orders accordingly - Yes  Lab work is in process  Labs reviewed in [facility]     Breast Cancer Screenin/23/2022     1:08 PM 2024     3:24 PM   Breast CA Risk Assessment (FHS-7)   Do you have a family history of breast, colon, or ovarian cancer? No / Unknown No / Unknown            Mammogram Screening - Mammogram every 1-2 years updated in Health Maintenance based on mutual decision making  Pertinent mammograms are reviewed under the imaging tab.     History of abnormal Pap smear: NO - age 30-65 PAP every 5 years with negative HPV co-testing recommended       Latest Ref Rng & Units 2020    10:02 AM 3/27/2015    10:40 AM   PAP / HPV   PAP Negative for squamous intraepithelial lesion or malignancy. Negative for squamous intraepithelial lesion or malignancy  Electronically signed by Vanessa Campbell CT (ASCP) on 3/2/2020 at 10:33 AM    Negative for squamous intraepithelial lesion or malignancy  Electronically signed by Gabbie Mckeon CT (ASCP) on 2015 at  2:15 PM      HPV 16 DNA NEG Negative      HPV 18 DNA NEG Negative      Other HR HPV NEG Negative         Reviewed and updated as needed this visit by clinical staff   Tobacco  Allergies  Meds               Reviewed and updated as needed this visit by Provider     Meds                    OBJECTIVE:   BP (!) 133/91   Pulse 69   Temp 97.6  F (36.4  C) (Temporal)   Resp  "16   Ht 1.505 m (4' 11.25\")   Wt 91.6 kg (202 lb)   LMP 02/07/2024   SpO2 96%   BMI 40.45 kg/m     Estimated body mass index is 40.45 kg/m  as calculated from the following:    Height as of this encounter: 1.505 m (4' 11.25\").    Weight as of this encounter: 91.6 kg (202 lb).  Physical Exam  General Appearance:  Alert, cooperative, no distress, appears stated age.  Head:  Normocephalic, without obvious abnormality, atraumatic.  Eyes:  Conjunctivae/corneas clear, extraocular movements intact both eyes.  Lungs:  Clear to auscultation bilaterally, respirations unlabored.  Heart:  Regular rate and rhythm, S1 and S2 normal, no murmur, rub or gallop.  Abdomen:  Soft, non-tender, bowel sounds active all four quadrants.   Extremities:  Atraumatic, no cyanosis or edema.  Skin:  Skin color, texture, turgor normal, no rashes or lesions.  Neurologic: No focal deficits.              ASSESSMENT/PLAN:   Anish was seen today for physical.     Diagnoses and all orders for this visit:     Routine general medical examination at a health care facility  -     PRIMARY CARE FOLLOW-UP SCHEDULING; Future  -     REVIEW OF HEALTH MAINTENANCE PROTOCOL ORDERS     Visit for screening mammogram  -     MA SCREENING DIGITAL BILAT - Future  (s+30); Future     Elevated BP without diagnosis of hypertension  Comments:  Ranges normal up to 130s/90s. Prehypertension range today. Asymptomatie. Discussed lifestyle modifications. RTC in 1 month. Compare R and L.     Neck mass  Comments:  Swollen lymph node, Likely reactive. Monitor.     Chest pain, unspecified type  Comments:  Chronic, intermittent. Non exertional. CTA 2020 normal. Will try NSAID or Tums for possible MSK or GERD.        "

## 2024-04-25 NOTE — PROGRESS NOTES
Anish was seen today for hypertension.    Diagnoses and all orders for this visit:    Primary hypertension  Comments:  BP persitently high. Discussed DASH diet. Trial med, RTC 1 month.  Orders:  -     lisinopril (ZESTRIL) 10 MG tablet; Take 1 tablet (10 mg) by mouth daily    Prediabetes  Comments:  Chronic. Not on meds. Defer recheck to next time per pt.    Neck mass  Comments:  Chronic, 6-7 months. Right side, below right ear above jaw. Stable size. Approximate [id number]. Order US.    Eye twitch  Comments:  R eye lid, 3x now. Sometimes on head and body. Last 2 months. No pain. Spontaneously resolves. Defer BMP until next time.    Muscle twitch    Iron deficiency anemia due to chronic blood loss  Comments:  Resolved. Only taking Fe during period.          Subjective   Anish is a 44 year old, presenting for the following health issues:  Hypertension (Concerns about lump on right upper neck. Hurts when when she touches it. Twitchy eyelids,sometimes on body.  )        4/25/2024     8:51 AM   Additional Questions   Roomed by eh   Accompanied by self     History of Present Illness       Hypertension: She presents for follow up of hypertension.  She does check blood pressure  regularly outside of the clinic. Outside blood pressures have been over 140/90. She follows a low salt diet.     She eats 2-3 servings of fruits and vegetables daily.She consumes 0 sweetened beverage(s) daily.She exercises with enough effort to increase her heart rate 30 to 60 minutes per day.  She exercises with enough effort to increase her heart rate 3 or less days per week.   She is taking medications regularly.     HTN  Diastolic number is usually 90s or higher at home.   Was 150/100 yesterday night.   Has been trying to keep sodium under 1500mg    lump on right side jaw line near ear  3-4 months, TTP, smaller now.       Lab Results   Component Value Date    HGB 12.3 10/04/2023    WBC 5.4 10/04/2023    MCV 89 10/04/2023    CR 0.85 02/15/2024     "AST 30 11/23/2022    ALT <5 (L) 11/23/2022    TSH 0.72 02/12/2024    GFRESTIMATED 87 02/15/2024    VITDT 31 08/18/2023    HCVAB Nonreactive 02/15/2024    HIAGAB Nonreactive 02/15/2024             Objective    BP (!) 137/96 (BP Location: Left arm, Patient Position: Sitting, Cuff Size: Adult Large)   Pulse 74   Temp 97.1  F (36.2  C) (Temporal)   Resp 16   Ht 1.505 m (4' 11.25\")   Wt 91.6 kg (202 lb)   LMP 04/17/2024 (Approximate)   SpO2 99%   BMI 40.45 kg/m    Body mass index is 40.45 kg/m .  Physical Exam   General: Well developed, well nourished.  Skin:  Dry without rash.    Head:  Normocephalic-atraumatic.    Neck: Right side, below right ear above jaw. Stable size. Approximate [id number].   Eye:  Normal conjunctivae.     Respiratory:  Normal respiratory effort.   Gastrointestinal:  Non-distended.    Musculoskeletal:  No deformity or edema.  Neurologic: No focal deficits.          Signed Electronically by: Romelia Spann MD    Prior to immunization administration, verified patients identity using patient s name and date of birth. Please see Immunization Activity for additional information.     Screening Questionnaire for Adult Immunization    Are you sick today?   No   Do you have allergies to medications, food, a vaccine component or latex?   No   Have you ever had a serious reaction after receiving a vaccination?   No   Do you have a long-term health problem with heart, lung, kidney, or metabolic disease (e.g., diabetes), asthma, a blood disorder, no spleen, complement component deficiency, a cochlear implant, or a spinal fluid leak?  Are you on long-term aspirin therapy?   No   Do you have cancer, leukemia, HIV/AIDS, or any other immune system problem?   Yes   Do you have a parent, brother, or sister with an immune system problem?   No   In the past 3 months, have you taken medications that affect  your immune system, such as prednisone, other steroids, or anticancer drugs; drugs for the treatment of rheumatoid " arthritis, Crohn s disease, or psoriasis; or have you had radiation treatments?   No   Have you had a seizure, or a brain or other nervous system problem?   No   During the past year, have you received a transfusion of blood or blood    products, or been given immune (gamma) globulin or antiviral drug?   No   For women: Are you pregnant or is there a chance you could become       pregnant during the next month?   No   Have you received any vaccinations in the past 4 weeks?   No     Immunization questionnaire was positive for at least one answer.  Notified provider.      Patient instructed to remain in clinic for 15 minutes afterwards, and to report any adverse reactions.     Screening performed by Dany Yanez MA on 4/25/2024 at 8:54 AM.

## 2024-04-26 ENCOUNTER — NURSE TRIAGE (OUTPATIENT)
Dept: NURSING | Facility: CLINIC | Age: 44
End: 2024-04-26
Payer: COMMERCIAL

## 2024-04-26 NOTE — TELEPHONE ENCOUNTER
Caller:   Patient    Situation:   Patient says she was started on lisinopril 10 mg by Dr. Spann.  She too the initial dose at 12:30 pm   Checked BP at 4:15 pm: BP 85/65, P 82  No symptoms noted by patient but was concerned about how low it was so she gargled w/ salt water and ate a bagel w/ cream cheese     Rechecked BP at 5:40 pm during call: BP: 130/99, p: 82    At 5:46 pm  -- 115/89; pulse 77    Fluid intake today - 32 oz    Background:  Just started lisinopril 10 mg today - new med  No previous med    Assessment:  Be seen w/I 4 hrs or PCP triage - is the dose too much?    Page Sent   Plan:   MD consult, Dr. Dr. Vaca Paged by RN at 5:52 pm  Reason for page: 2nd level triage: low bp  Susan Smiley RN 04/26/24 5:51 PM      Provider recommendation: Take half a pill and see if it would work better or if cannot cut it b/c it's too small, she can try to take a full dose and see if it would lower her BP to a normal level instead as low as it went today.      Recommendation:  Disposition: Home care: take half dose of the medication    6 pm --Call patient and left message for patient to call back.  If patient calls back -- please relay information above.    Susan Smiley RN, BSN  Triage Nurse Advisor          Reason for Disposition   [1] Systolic BP < 90 AND [2] NOT dizzy, lightheaded or weak    Additional Information   Negative: Started suddenly after an allergic medicine, an allergic food, or bee sting   Negative: Shock suspected (e.g., cold/pale/clammy skin, too weak to stand, low BP, rapid pulse)   Negative: Difficult to awaken or acting confused (e.g., disoriented, slurred speech)   Negative: Fainted   Negative: [1] Systolic BP < 90 AND [2] dizzy, lightheaded, or weak   Negative: Chest pain   Negative: Bleeding (e.g., vomiting blood, rectal bleeding or tarry stools, severe vaginal bleeding)(Exception: Fainted from sight of small amount of blood; small cut or abrasion.)   Negative: Extra heartbeats, irregular heart  "beating, or heart is beating very fast  (i.e., \"palpitations\")   Negative: Sounds like a life-threatening emergency to the triager   Negative: [1] Systolic BP < 80 AND [2] NOT dizzy, lightheaded or weak   Negative: Abdominal pain   Negative: Fever > 100.4 F (38.0 C)   Negative: Major surgery in the past month   Negative: [1] Drinking very little AND [2] dehydration suspected (e.g., no urine > 12 hours, very dry mouth, very lightheaded)   Negative: [1] Fall in systolic BP > 20 mm Hg from normal AND [2] dizzy, lightheaded, or weak   Negative: Patient sounds very sick or weak to the triager    Protocols used: Blood Pressure - Low-A-AH    "

## 2024-05-01 ENCOUNTER — MYC MEDICAL ADVICE (OUTPATIENT)
Dept: FAMILY MEDICINE | Facility: CLINIC | Age: 44
End: 2024-05-01
Payer: COMMERCIAL

## 2024-05-02 NOTE — TELEPHONE ENCOUNTER
Pt called RN on 4/26 and case reviewed with Dr Vaca who recommended trial of 5mg daily next.     I support this plan.

## 2024-05-02 NOTE — TELEPHONE ENCOUNTER
Pt saw Dr. Spann and was prescribed lisinopril 10 mg on 4/25/24. Dr. Spann is not in office today. Routing to PCP to review and advise.      Dany Mcgraw BSN RN  [facility]

## 2024-05-24 ENCOUNTER — OFFICE VISIT (OUTPATIENT)
Dept: FAMILY MEDICINE | Facility: CLINIC | Age: 44
End: 2024-05-24
Payer: COMMERCIAL

## 2024-05-24 VITALS
TEMPERATURE: 97.3 F | HEIGHT: 59 IN | SYSTOLIC BLOOD PRESSURE: 120 MMHG | HEART RATE: 70 BPM | DIASTOLIC BLOOD PRESSURE: 86 MMHG | RESPIRATION RATE: 16 BRPM | BODY MASS INDEX: 40.92 KG/M2 | OXYGEN SATURATION: 99 % | WEIGHT: 203 LBS

## 2024-05-24 DIAGNOSIS — E06.3 HYPOTHYROIDISM DUE TO HASHIMOTO'S THYROIDITIS: ICD-10-CM

## 2024-05-24 DIAGNOSIS — Z13.220 SCREENING FOR HYPERLIPIDEMIA: ICD-10-CM

## 2024-05-24 DIAGNOSIS — R22.1 NECK MASS: ICD-10-CM

## 2024-05-24 DIAGNOSIS — E55.9 VITAMIN D DEFICIENCY: ICD-10-CM

## 2024-05-24 DIAGNOSIS — E66.813 CLASS 3 SEVERE OBESITY WITH BODY MASS INDEX (BMI) OF 40.0 TO 44.9 IN ADULT, UNSPECIFIED OBESITY TYPE, UNSPECIFIED WHETHER SERIOUS COMORBIDITY PRESENT (H): ICD-10-CM

## 2024-05-24 DIAGNOSIS — R73.03 PREDIABETES: ICD-10-CM

## 2024-05-24 DIAGNOSIS — E66.01 CLASS 3 SEVERE OBESITY WITH BODY MASS INDEX (BMI) OF 40.0 TO 44.9 IN ADULT, UNSPECIFIED OBESITY TYPE, UNSPECIFIED WHETHER SERIOUS COMORBIDITY PRESENT (H): ICD-10-CM

## 2024-05-24 DIAGNOSIS — I10 PRIMARY HYPERTENSION: Primary | ICD-10-CM

## 2024-05-24 LAB
ERYTHROCYTE [DISTWIDTH] IN BLOOD BY AUTOMATED COUNT: 13.7 % (ref 10–15)
HBA1C MFR BLD: 5.6 % (ref 0–5.6)
HCT VFR BLD AUTO: 38.9 % (ref 35–47)
HGB BLD-MCNC: 12.5 G/DL (ref 11.7–15.7)
MCH RBC QN AUTO: 28.3 PG (ref 26.5–33)
MCHC RBC AUTO-ENTMCNC: 32.1 G/DL (ref 31.5–36.5)
MCV RBC AUTO: 88 FL (ref 78–100)
PLATELET # BLD AUTO: 232 10E3/UL (ref 150–450)
RBC # BLD AUTO: 4.41 10E6/UL (ref 3.8–5.2)
WBC # BLD AUTO: 6.5 10E3/UL (ref 4–11)

## 2024-05-24 PROCEDURE — 80061 LIPID PANEL: CPT | Performed by: STUDENT IN AN ORGANIZED HEALTH CARE EDUCATION/TRAINING PROGRAM

## 2024-05-24 PROCEDURE — 80053 COMPREHEN METABOLIC PANEL: CPT | Performed by: STUDENT IN AN ORGANIZED HEALTH CARE EDUCATION/TRAINING PROGRAM

## 2024-05-24 PROCEDURE — 85027 COMPLETE CBC AUTOMATED: CPT | Performed by: STUDENT IN AN ORGANIZED HEALTH CARE EDUCATION/TRAINING PROGRAM

## 2024-05-24 PROCEDURE — 82306 VITAMIN D 25 HYDROXY: CPT | Performed by: STUDENT IN AN ORGANIZED HEALTH CARE EDUCATION/TRAINING PROGRAM

## 2024-05-24 PROCEDURE — 84439 ASSAY OF FREE THYROXINE: CPT | Performed by: STUDENT IN AN ORGANIZED HEALTH CARE EDUCATION/TRAINING PROGRAM

## 2024-05-24 PROCEDURE — G2211 COMPLEX E/M VISIT ADD ON: HCPCS | Performed by: STUDENT IN AN ORGANIZED HEALTH CARE EDUCATION/TRAINING PROGRAM

## 2024-05-24 PROCEDURE — 99214 OFFICE O/P EST MOD 30 MIN: CPT | Performed by: STUDENT IN AN ORGANIZED HEALTH CARE EDUCATION/TRAINING PROGRAM

## 2024-05-24 PROCEDURE — 84443 ASSAY THYROID STIM HORMONE: CPT | Performed by: STUDENT IN AN ORGANIZED HEALTH CARE EDUCATION/TRAINING PROGRAM

## 2024-05-24 PROCEDURE — 36415 COLL VENOUS BLD VENIPUNCTURE: CPT | Performed by: STUDENT IN AN ORGANIZED HEALTH CARE EDUCATION/TRAINING PROGRAM

## 2024-05-24 PROCEDURE — 83036 HEMOGLOBIN GLYCOSYLATED A1C: CPT | Performed by: STUDENT IN AN ORGANIZED HEALTH CARE EDUCATION/TRAINING PROGRAM

## 2024-05-24 NOTE — PROGRESS NOTES
Primary hypertension  Comments:  BP persitently high. Discussed DASH diet. Trial med, RTC 1 month.  Orders:  -     lisinopril (ZESTRIL) 10 MG tablet; Take 1 tablet (10 mg) by mouth daily     Prediabetes  Comments:  Chronic. Not on meds. Defer recheck to next time per pt.     Neck mass  Comments:  Chronic, 6-7 months. Right side, below right ear above jaw. Stable size. Approximate [id number]. Order US.     Eye twitch  Comments:  R eye lid, 3x now. Sometimes on head and body. Last 2 months. No pain. Spontaneously resolves. Defer BMP until next time.

## 2024-05-24 NOTE — PROGRESS NOTES
Anish was seen today for hypertension.    Diagnoses and all orders for this visit:    Primary hypertension  Comments:  Blood pressure was too low with lisinopril 10 mg.  Patient has made lifestyle changes without lisinopril and BP WNL.  Will discontinue lisinopril.  Monitor.    Neck mass  Comments:  Chronic, 10 months. Right side, below right ear above jaw. Stable size. Approximate [id number]. Order US.  Orders:  -     US Head Neck Soft Tissue; Future  -     CBC with platelets; Future    Vitamin D deficiency  Comments:  Per chart review.  Has labs pending from Endo.  Will release.    Prediabetes  Comments:  Chronic, recheck today.  Orders:  -     Hemoglobin A1c; Future    Class 3 severe obesity with body mass index (BMI) of 40.0 to 44.9 in adult, unspecified obesity type, unspecified whether serious comorbidity present (H)  Comments:  Discussed lifestyle modifications.  Orders:  -     Comprehensive metabolic panel (BMP + Alb, Alk Phos, ALT, AST, Total. Bili, TP); Future    Screening for hyperlipidemia  -     Lipid Profile (Chol, Trig, HDL, LDL calc); Future          Subjective   Anish is a 44 year old, presenting for the following health issues:  Hypertension (Follow up on bp)        5/24/2024    11:26 AM   Additional Questions   Roomed by eh   Accompanied by self     History of Present Illness       Hypertension: She presents for follow up of hypertension.  She does check blood pressure  regularly outside of the clinic. Outside blood pressures have been over 140/90. She follows a low salt diet.     She eats 2-3 servings of fruits and vegetables daily.She consumes 0 sweetened beverage(s) daily.She exercises with enough effort to increase her heart rate 30 to 60 minutes per day.  She exercises with enough effort to increase her heart rate 4 days per week.   She is taking medications regularly.    Pt reports that when she took lisinopril 10mg, her BP was too low 80/60's. She did decrease it to 5mg, -120's, DBP  "80-90's.   Pt has stopped taking lisinopril all together. Started eating more meat and fruit diet.            Objective    /86 (BP Location: Left arm, Patient Position: Sitting, Cuff Size: Adult Regular)   Pulse 70   Temp 97.3  F (36.3  C) (Temporal)   Resp 16   Ht 1.505 m (4' 11.25\")   Wt 92.1 kg (203 lb)   LMP 05/21/2024 (Approximate)   SpO2 99%   BMI 40.65 kg/m    Body mass index is 40.65 kg/m .  Physical Exam   General: Well developed, well nourished.  Skin:  Dry without rash.    Head:  Normocephalic-atraumatic.    Eye:  Normal conjunctivae.     Respiratory:  Normal respiratory effort.   Gastrointestinal:  Non-distended.    Musculoskeletal:  No deformity or edema.  Neurologic: No focal deficits.          Signed Electronically by: Romelia Spann MD    Prior to immunization administration, verified patients identity using patient s name and date of birth. Please see Immunization Activity for additional information.     Screening Questionnaire for Adult Immunization    Are you sick today?   No   Do you have allergies to medications, food, a vaccine component or latex?   No   Have you ever had a serious reaction after receiving a vaccination?   No   Do you have a long-term health problem with heart, lung, kidney, or metabolic disease (e.g., diabetes), asthma, a blood disorder, no spleen, complement component deficiency, a cochlear implant, or a spinal fluid leak?  Are you on long-term aspirin therapy?   No   Do you have cancer, leukemia, HIV/AIDS, or any other immune system problem?   No   Do you have a parent, brother, or sister with an immune system problem?   No   In the past 3 months, have you taken medications that affect  your immune system, such as prednisone, other steroids, or anticancer drugs; drugs for the treatment of rheumatoid arthritis, Crohn s disease, or psoriasis; or have you had radiation treatments?   No   Have you had a seizure, or a brain or other nervous system problem?   No   During " the past year, have you received a transfusion of blood or blood    products, or been given immune (gamma) globulin or antiviral drug?   No   For women: Are you pregnant or is there a chance you could become       pregnant during the next month?   No   Have you received any vaccinations in the past 4 weeks?   No     Immunization questionnaire answers were all negative.      Patient instructed to remain in clinic for 15 minutes afterwards, and to report any adverse reactions.     Screening performed by Dany Yanez MA on 5/24/2024 at 11:28 AM.

## 2024-05-25 LAB
ALBUMIN SERPL BCG-MCNC: 4.2 G/DL (ref 3.5–5.2)
ALP SERPL-CCNC: 60 U/L (ref 40–150)
ALT SERPL W P-5'-P-CCNC: 17 U/L (ref 0–50)
ANION GAP SERPL CALCULATED.3IONS-SCNC: 10 MMOL/L (ref 7–15)
AST SERPL W P-5'-P-CCNC: 19 U/L (ref 0–45)
BILIRUB SERPL-MCNC: 0.5 MG/DL
BUN SERPL-MCNC: 20.7 MG/DL (ref 6–20)
CALCIUM SERPL-MCNC: 9 MG/DL (ref 8.6–10)
CHLORIDE SERPL-SCNC: 103 MMOL/L (ref 98–107)
CHOLEST SERPL-MCNC: 181 MG/DL
CREAT SERPL-MCNC: 0.94 MG/DL (ref 0.51–0.95)
DEPRECATED HCO3 PLAS-SCNC: 25 MMOL/L (ref 22–29)
EGFRCR SERPLBLD CKD-EPI 2021: 77 ML/MIN/1.73M2
FASTING STATUS PATIENT QL REPORTED: NO
FASTING STATUS PATIENT QL REPORTED: NO
GLUCOSE SERPL-MCNC: 82 MG/DL (ref 70–99)
HDLC SERPL-MCNC: 43 MG/DL
LDLC SERPL CALC-MCNC: 117 MG/DL
NONHDLC SERPL-MCNC: 138 MG/DL
POTASSIUM SERPL-SCNC: 4.2 MMOL/L (ref 3.4–5.3)
PROT SERPL-MCNC: 7.6 G/DL (ref 6.4–8.3)
SODIUM SERPL-SCNC: 138 MMOL/L (ref 135–145)
T4 FREE SERPL-MCNC: 1.38 NG/DL (ref 0.9–1.7)
TRIGL SERPL-MCNC: 106 MG/DL
TSH SERPL DL<=0.005 MIU/L-ACNC: 1.07 UIU/ML (ref 0.3–4.2)
VIT D+METAB SERPL-MCNC: 32 NG/ML (ref 20–50)

## 2024-07-15 ENCOUNTER — HOSPITAL ENCOUNTER (OUTPATIENT)
Dept: ULTRASOUND IMAGING | Facility: HOSPITAL | Age: 44
Discharge: HOME | End: 2024-07-15
Attending: STUDENT IN AN ORGANIZED HEALTH CARE EDUCATION/TRAINING PROGRAM | Admitting: STUDENT IN AN ORGANIZED HEALTH CARE EDUCATION/TRAINING PROGRAM
Payer: COMMERCIAL

## 2024-07-15 ENCOUNTER — MYC MEDICAL ADVICE (OUTPATIENT)
Dept: FAMILY MEDICINE | Facility: CLINIC | Age: 44
End: 2024-07-15
Payer: COMMERCIAL

## 2024-07-15 DIAGNOSIS — R22.0 SWELLING, MASS, OR LUMP ON FACE: Primary | ICD-10-CM

## 2024-07-15 DIAGNOSIS — R22.1 NECK MASS: ICD-10-CM

## 2024-07-15 PROCEDURE — 76536 US EXAM OF HEAD AND NECK: CPT

## 2024-07-29 ENCOUNTER — OFFICE VISIT (OUTPATIENT)
Dept: FAMILY MEDICINE | Facility: CLINIC | Age: 44
End: 2024-07-29
Payer: COMMERCIAL

## 2024-07-29 VITALS
HEIGHT: 59 IN | OXYGEN SATURATION: 98 % | RESPIRATION RATE: 18 BRPM | SYSTOLIC BLOOD PRESSURE: 130 MMHG | DIASTOLIC BLOOD PRESSURE: 86 MMHG | HEART RATE: 80 BPM | TEMPERATURE: 98.6 F | BODY MASS INDEX: 41.24 KG/M2 | WEIGHT: 204.6 LBS

## 2024-07-29 DIAGNOSIS — R25.3 MUSCLE TWITCH: Primary | ICD-10-CM

## 2024-07-29 LAB
FERRITIN SERPL-MCNC: 22 NG/ML (ref 6–175)
MAGNESIUM SERPL-MCNC: 2.1 MG/DL (ref 1.7–2.3)
VIT B12 SERPL-MCNC: 460 PG/ML (ref 232–1245)

## 2024-07-29 PROCEDURE — 36415 COLL VENOUS BLD VENIPUNCTURE: CPT | Performed by: NURSE PRACTITIONER

## 2024-07-29 PROCEDURE — 99214 OFFICE O/P EST MOD 30 MIN: CPT | Performed by: NURSE PRACTITIONER

## 2024-07-29 PROCEDURE — 82728 ASSAY OF FERRITIN: CPT | Performed by: NURSE PRACTITIONER

## 2024-07-29 PROCEDURE — 82607 VITAMIN B-12: CPT | Performed by: NURSE PRACTITIONER

## 2024-07-29 PROCEDURE — 83735 ASSAY OF MAGNESIUM: CPT | Performed by: NURSE PRACTITIONER

## 2024-07-29 NOTE — PROGRESS NOTES
"  Assessment & Plan     Muscle twitch  Normal lab work.  I am unclear what is causing this symptoms for patient. I think she should be seen by neurology to evaluate further.  We did discuss possible MRI but I think she should see neurology first before imaging in case there is recommendation for more specialized testing.   - Magnesium; Future  - Vitamin B12; Future  - Ferritin; Future  - Adult Neurology  Referral; Future  - Magnesium  - Vitamin B12  - Ferritin                Subjective   Anish is a 44 year old, presenting for the following health issues:  Musculoskeletal Problem (Spasm throughout the whole body. Feels it mostly on right ear and head. Last for a few minutes sometimes hours x mostly at night x past 3 months )      7/29/2024     7:48 AM   Additional Questions   Roomed by lilian hernandez   Accompanied by melly     History of Present Illness       Reason for visit:  Spasm in my body. I feel them all over, from my head, face, arms, legs, feet, back, stomach.  Symptom onset:  More than a month  Symptoms include:  Spasm in my body. I feel them all over, from my head, face, arms, legs, feet, back, stomach.  Symptom intensity:  Mild  Symptom progression:  Worsening  Had these symptoms before:  No    She eats 2-3 servings of fruits and vegetables daily.She consumes 1 sweetened beverage(s) daily.She exercises with enough effort to increase her heart rate 30 to 60 minutes per day.  She exercises with enough effort to increase her heart rate 3 or less days per week.   She is taking medications regularly.     Reports doesn't feel sick, tightening, cramping, numbing, feels like she is \"twitching\" feels on top of head\--right of head to side of face is the worst. Can last for minutes to hours. Reports feels like mouth when tries to smile will twitch. No vision changes.     Also noticed lump behind ear-has biopsy coming on Thursday-behind jaw line.                   Objective    BP (!) 124/98   Pulse 80   Temp 98.6  F " "(37  C) (Oral)   Resp 18   Ht 1.505 m (4' 11.25\")   Wt 92.8 kg (204 lb 9.6 oz)   LMP 07/03/2024 (Exact Date)   SpO2 98%   BMI 40.97 kg/m    Body mass index is 40.97 kg/m .  Physical Exam  Constitutional:       Appearance: Normal appearance.   Neurological:      General: No focal deficit present.      Mental Status: She is alert.            Results for orders placed or performed in visit on 07/29/24   Magnesium     Status: Normal   Result Value Ref Range    Magnesium 2.1 1.7 - 2.3 mg/dL   Vitamin B12     Status: Normal   Result Value Ref Range    Vitamin B12 460 232 - 1,245 pg/mL   Ferritin     Status: Normal   Result Value Ref Range    Ferritin 22 6 - 175 ng/mL           Signed Electronically by: MARKELL PENALOZA CNP    "

## 2024-07-31 DIAGNOSIS — E06.3 HYPOTHYROIDISM DUE TO HASHIMOTO'S THYROIDITIS: Primary | ICD-10-CM

## 2024-08-01 ENCOUNTER — HOSPITAL ENCOUNTER (OUTPATIENT)
Dept: ULTRASOUND IMAGING | Facility: CLINIC | Age: 44
Discharge: HOME | End: 2024-08-01
Attending: STUDENT IN AN ORGANIZED HEALTH CARE EDUCATION/TRAINING PROGRAM | Admitting: STUDENT IN AN ORGANIZED HEALTH CARE EDUCATION/TRAINING PROGRAM
Payer: COMMERCIAL

## 2024-08-01 DIAGNOSIS — R22.0 SWELLING, MASS, OR LUMP ON FACE: Primary | ICD-10-CM

## 2024-08-01 PROCEDURE — 272N000710 US BIOPSY PAROTID FINE NEEDLE ASPIRATION

## 2024-08-01 PROCEDURE — 88173 CYTOPATH EVAL FNA REPORT: CPT | Mod: TC | Performed by: STUDENT IN AN ORGANIZED HEALTH CARE EDUCATION/TRAINING PROGRAM

## 2024-08-01 PROCEDURE — 88305 TISSUE EXAM BY PATHOLOGIST: CPT | Mod: TC | Performed by: STUDENT IN AN ORGANIZED HEALTH CARE EDUCATION/TRAINING PROGRAM

## 2024-08-05 ENCOUNTER — MYC MEDICAL ADVICE (OUTPATIENT)
Dept: FAMILY MEDICINE | Facility: CLINIC | Age: 44
End: 2024-08-05
Payer: COMMERCIAL

## 2024-08-05 DIAGNOSIS — D11.0 PLEOMORPHIC ADENOMA OF PAROTID GLAND: Primary | ICD-10-CM

## 2024-08-05 LAB
PATH REPORT.COMMENTS IMP SPEC: NORMAL
PATH REPORT.COMMENTS IMP SPEC: NORMAL
PATH REPORT.FINAL DX SPEC: NORMAL
PATH REPORT.GROSS SPEC: NORMAL
PATH REPORT.MICROSCOPIC SPEC OTHER STN: NORMAL
PATH REPORT.RELEVANT HX SPEC: NORMAL

## 2024-08-05 PROCEDURE — 88305 TISSUE EXAM BY PATHOLOGIST: CPT | Mod: 26 | Performed by: PATHOLOGY

## 2024-08-05 PROCEDURE — 88172 CYTP DX EVAL FNA 1ST EA SITE: CPT | Mod: 26 | Performed by: PATHOLOGY

## 2024-08-05 PROCEDURE — 88173 CYTOPATH EVAL FNA REPORT: CPT | Mod: 26 | Performed by: PATHOLOGY

## 2024-08-06 NOTE — TELEPHONE ENCOUNTER
Order/Referral Request    Who is requesting: patient    Orders being requested: ENT Referral    Reason service is needed/diagnosis: discussed with provider    When are orders needed by: asap    Has this been discussed with Provider: Yes    Does patient have a preference on a Group/Provider/Facility? [facility]    Does patient have an appointment scheduled?: No    Where to send orders: Place orders within Epic    Could we send this information to you in [facility] or would you prefer to receive a phone call?:   Patient would prefer a phone call   Okay to leave a detailed message?: Yes at Home number on file [phone] (home)

## 2024-08-19 ENCOUNTER — LAB (OUTPATIENT)
Dept: LAB | Facility: CLINIC | Age: 44
End: 2024-08-19
Payer: COMMERCIAL

## 2024-08-19 DIAGNOSIS — E06.3 HYPOTHYROIDISM DUE TO HASHIMOTO'S THYROIDITIS: ICD-10-CM

## 2024-08-19 LAB
T4 FREE SERPL-MCNC: 1.08 NG/DL (ref 0.9–1.7)
TSH SERPL DL<=0.005 MIU/L-ACNC: 1.9 UIU/ML (ref 0.3–4.2)

## 2024-08-19 PROCEDURE — 36415 COLL VENOUS BLD VENIPUNCTURE: CPT

## 2024-08-19 PROCEDURE — 84439 ASSAY OF FREE THYROXINE: CPT

## 2024-08-19 PROCEDURE — 84443 ASSAY THYROID STIM HORMONE: CPT

## 2024-08-22 ENCOUNTER — VIRTUAL VISIT (OUTPATIENT)
Dept: ENDOCRINOLOGY | Facility: CLINIC | Age: 44
End: 2024-08-22
Payer: COMMERCIAL

## 2024-08-22 DIAGNOSIS — E06.3 HYPOTHYROIDISM DUE TO HASHIMOTO'S THYROIDITIS: Primary | ICD-10-CM

## 2024-08-22 PROCEDURE — 99214 OFFICE O/P EST MOD 30 MIN: CPT | Mod: 95 | Performed by: NURSE PRACTITIONER

## 2024-08-22 RX ORDER — LEVOTHYROXINE SODIUM 75 UG/1
75 TABLET ORAL DAILY
Qty: 90 TABLET | Refills: 3 | Status: SHIPPED | OUTPATIENT
Start: 2024-08-22

## 2024-08-22 NOTE — PROGRESS NOTES
[facility] ENDOCRINOLOGY    Thyroid Note  8/22/2024    Anish Hauser, 1980, [id number]          Reason for visit      1. Hypothyroidism due to Hashimoto's thyroiditis        HPI     Anish Hauser is a very pleasant 44 year old old female who presents for follow up.  SUMMARY:    Anish is seen via Video Visit in follow-up for Hypothyroidism due to Hashimoto's. She reports today that she is feeling well. 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 It was found with bx to be benign, fortunately. She will be meeting with ENT in October for likely excision.     Her current TSH is 1.90 and fT4 was 1.08. She is taking Levothyroxine, 75 mcg daily.    Past Medical History     Patient Active Problem List   Diagnosis    Adjustment disorder with mixed anxiety and depressed mood    Morbid obesity (H)    Hypothyroidism due to Hashimoto's thyroiditis    Hidradenitis    Prediabetes    Iron deficiency anemia due to chronic blood loss    Family history of kidney disease    Primary hypertension    Vitamin D deficiency    Swelling, mass, or lump on face    Pleomorphic adenoma of parotid gland       Family History       family history includes Diabetes in her mother; Hepatitis in her maternal grandmother; Hyperlipidemia in her father and mother; Hypertension in her brother, maternal grandmother, and mother; IgA nephropathy in her brother and daughter; Liver Disease in her brother; No Known Problems in her sister, sister, sister, sister, and son; Uterine Cancer in her mother.    Social History      reports that she quit smoking about 15 years ago. Her smoking use included cigarettes. She has never been exposed to tobacco smoke. She quit smokeless tobacco use about 10 years ago. She reports that she does not drink alcohol and does not use drugs.      Review of Systems     Patient denies fatigue, weight changes, heat/cold intolerance, bowel/skin changes or CVS symptoms.   Remainder per HPI and per attached  "intake form.      Vital Signs     LMP 07/03/2024 (Exact Date)   Wt Readings from Last 3 Encounters:   07/29/24 92.8 kg (204 lb 9.6 oz)   05/24/24 92.1 kg (203 lb)   04/25/24 91.6 kg (202 lb)       Physical Exam     Constitutional:  Well developed, Well nourished  HENT:  Normocephalic,   Neck: normal in appearance  Eyes:  PERRL, Conjunctiva pink  Respiratory:  No respiratory distress  Skin: No acanthosis nigricans, lipoatrophy or lipodystrophy  Neurologic:  Alert & oriented x 3, nonfocal  Psychiatric:  Affect, Mood, Insight appropriate        Assessment     1. Hypothyroidism due to Hashimoto's thyroiditis            Plan     She is bio-chemically and physically euthyroid. She will remain on her current dose of Levothyroxine and will follow-up with me in 6 months.         Kathy Brown NP  [facility]  8/22/2024  2:46 PM      Lab Results     TSH   Date Value Ref Range Status   08/19/2024 1.90 0.30 - 4.20 uIU/mL Final   02/11/2022 2.09 0.30 - 5.00 uIU/mL Final     No components found for: \"THYROIDAB\"    No results found for: \"N8HZLDQ\"    Imaging Results   Last thyroid ultrasound:  No results found for this or any previous visit.      Last thyroid nuclear scan:  No results found for this or any previous visit.      Current Medications     Outpatient Medications Prior to Visit   Medication Sig Dispense Refill    ferrous gluconate (FERGON) 324 (38 Fe) MG tablet Take iron pill once daily with food for 7 days/month during her period 21 tablet 4    levothyroxine (SYNTHROID/LEVOTHROID) 75 MCG tablet Take 1 tablet (75 mcg) by mouth daily 90 tablet 3    Vitamin D3 (CHOLECALCIFEROL) 125 MCG (5000 UT) tablet Take 1 tablet by mouth daily (Patient not taking: Reported on 8/22/2024)       No facility-administered medications prior to visit.     Visit Start Time: 1530  Visit Stop tIme: 1550        Virtual Visit Details    Type of service:  Video Visit     Originating Location (pt. Location): Home    Distant Location (provider " location):  Off-site  Platform used for Video Visit: Emiliano

## 2024-08-22 NOTE — LETTER
8/22/2024      Anish Hauser  [address]  [place] [address]      Dear Colleague,    Thank you for referring your patient, Anish Hauser, to the [facility] [place]. Please see a copy of my visit note below.    [facility] ENDOCRINOLOGY    Thyroid Note  8/22/2024    Anish Hauser, 1980, [id number]          Reason for visit      1. Hypothyroidism due to Hashimoto's thyroiditis        HPI     Anish Hauser is a very pleasant 44 year old old female who presents for follow up.  SUMMARY:    Anish is seen via Video Visit in follow-up for Hypothyroidism due to Hashimoto's. She reports today that she is feeling well. 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 It was found with bx to be benign, fortunately. She will be meeting with ENT in October for likely excision.     Her current TSH is 1.90 and fT4 was 1.08. She is taking Levothyroxine, 75 mcg daily.    Past Medical History     Patient Active Problem List   Diagnosis     Adjustment disorder with mixed anxiety and depressed mood     Morbid obesity (H)     Hypothyroidism due to Hashimoto's thyroiditis     Hidradenitis     Prediabetes     Iron deficiency anemia due to chronic blood loss     Family history of kidney disease     Primary hypertension     Vitamin D deficiency     Swelling, mass, or lump on face     Pleomorphic adenoma of parotid gland       Family History       family history includes Diabetes in her mother; Hepatitis in her maternal grandmother; Hyperlipidemia in her father and mother; Hypertension in her brother, maternal grandmother, and mother; IgA nephropathy in her brother and daughter; Liver Disease in her brother; No Known Problems in her sister, sister, sister, sister, and son; Uterine Cancer in her mother.    Social History      reports that she quit smoking about 15 years ago. Her smoking use included cigarettes. She has never been exposed to tobacco smoke. She quit smokeless tobacco use about 10  "years ago. She reports that she does not drink alcohol and does not use drugs.      Review of Systems     Patient denies fatigue, weight changes, heat/cold intolerance, bowel/skin changes or CVS symptoms.   Remainder per HPI and per attached intake form.      Vital Signs     LMP 07/03/2024 (Exact Date)   Wt Readings from Last 3 Encounters:   07/29/24 92.8 kg (204 lb 9.6 oz)   05/24/24 92.1 kg (203 lb)   04/25/24 91.6 kg (202 lb)       Physical Exam     Constitutional:  Well developed, Well nourished  HENT:  Normocephalic,   Neck: normal in appearance  Eyes:  PERRL, Conjunctiva pink  Respiratory:  No respiratory distress  Skin: No acanthosis nigricans, lipoatrophy or lipodystrophy  Neurologic:  Alert & oriented x 3, nonfocal  Psychiatric:  Affect, Mood, Insight appropriate        Assessment     1. Hypothyroidism due to Hashimoto's thyroiditis            Plan     She is bio-chemically and physically euthyroid. She will remain on her current dose of Levothyroxine and will follow-up with me in 6 months.         Kathy Brown NP   Endocrinology  8/22/2024  2:46 PM      Lab Results     TSH   Date Value Ref Range Status   08/19/2024 1.90 0.30 - 4.20 uIU/mL Final   02/11/2022 2.09 0.30 - 5.00 uIU/mL Final     No components found for: \"THYROIDAB\"    No results found for: \"Q9LTLRW\"    Imaging Results   Last thyroid ultrasound:  No results found for this or any previous visit.      Last thyroid nuclear scan:  No results found for this or any previous visit.      Current Medications     Outpatient Medications Prior to Visit   Medication Sig Dispense Refill     ferrous gluconate (FERGON) 324 (38 Fe) MG tablet Take iron pill once daily with food for 7 days/month during her period 21 tablet 4     levothyroxine (SYNTHROID/LEVOTHROID) 75 MCG tablet Take 1 tablet (75 mcg) by mouth daily 90 tablet 3     Vitamin D3 (CHOLECALCIFEROL) 125 MCG (5000 UT) tablet Take 1 tablet by mouth daily (Patient not taking: Reported on 8/22/2024)   "     No facility-administered medications prior to visit.     Visit Start Time: 1530  Visit Stop tIme: 1550        Virtual Visit Details    Type of service:  Video Visit     Originating Location (pt. Location): Home    Distant Location (provider location):  Off-site  Platform used for Video Visit: Emiliano            Again, thank you for allowing me to participate in the care of your patient.        Sincerely,        Kathy Brown NP

## 2024-08-29 NOTE — TELEPHONE ENCOUNTER
FUTURE VISIT INFORMATION      FUTURE VISIT INFORMATION:  Date: 10/11/2024  Time: 2:20 PM  Location: [facility] - ENT  REFERRAL INFORMATION:  Referring provider:  Romelia Spann MD  Referring providers clinic:  [facility]   Reason for visit/diagnosis:  Pleomorphic adenoma of parotid gland [D11.0]. parotid cyst - pt would like removal. Ref by Romelia Spann MD. [facility] verified     RECORDS REQUESTED FROM      Clinic name Comments Records Status Imaging Status   [facility]  8/5/24 mychart referral  5/24/24 + 4/25/24 [facility]24 [facility] biopsy [facility]  [facility]   [facility]  [address]  [place] [address]  Phone [phone]  Fax: [phone] Cytology, non-gynecologic   8/1/2024 Case: [id number]  Final Diagnosis   Specimen A              Interpretation:                Neoplasm, benign (SGBENIGN), Benign mixed tumor (pleomorphic adenoma). (SGBENIGN)    ULTRASOUND-GUIDED NEEDLE ASPIRATION OF NODULE, RIGHT PAROTID SALIVARY GLAND:        -  CYTOLOGY IS MOST CONSISTENT WITH PLEOMORPHIC ADENOMA WITH FOCAL SEBACEOUS DIFFERENTIATION (CATEGORY SGBENIGN)        -  NEGATIVE FOR MALIGNANT CELLS     Track: [id number] Epic    Path req 8/30/24    Path received   9/4/24 August 30, 2024 12:44 PM - Request send for path slides from  Lab -Romelia    Action September 4, 2024 12:53 PM Diane Barrios Taken Slides from [facility] received and taken to 5th floor path lab for review.

## 2024-09-05 ENCOUNTER — LAB REQUISITION (OUTPATIENT)
Dept: LAB | Facility: CLINIC | Age: 44
End: 2024-09-05
Payer: COMMERCIAL

## 2024-09-09 LAB
PATH REPORT.COMMENTS IMP SPEC: NORMAL
PATH REPORT.FINAL DX SPEC: NORMAL
PATH REPORT.GROSS SPEC: NORMAL
PATH REPORT.MICROSCOPIC SPEC OTHER STN: NORMAL
PATH REPORT.RELEVANT HX SPEC: NORMAL
PATH REPORT.RELEVANT HX SPEC: NORMAL
PATH REPORT.SITE OF ORIGIN SPEC: NORMAL

## 2024-10-10 NOTE — PROGRESS NOTES
Dear Dr. Spann:    I had the pleasure of meeting Anish Hauser in consultation today at the [facility] at your request.     History of Present Illness:   Anish Hauser is a 44 year old woman referred for evaluation of a right parotid mass.    She saw her PCP in April 2024 with a 6-7 month history of a mass below the right ear. She was recommended for an U/S. She saw her PCP again in May 2024, recommended U/S again. U/S on 7/15/2024 demonstrated 1.8 x 1.7 x 1.5 cm mass in right parotid. She had an IR guided biopsy on 8/1/2024 which demonstrated pleomorphic adenoma on outside read, [place] read [facility].    She has a history of right facial twitch, initially in eye then also face and rest of body.     She noticed the mass herself. She does not think it has changed in size from first noticing it. She has history of right eyelid twitching that has resolved, now has twitching in her head and ear. She also has them throughout her whole body. She has no ear pain. She has no facial numbness.       Past medical history: hypertension, Hashimoto thyroiditis     Past surgical history: cholecystectomy (no bleeding or anesthesia issues)    Social history: Smoked, quit in 2008, about 1/2 ppd, about 10 years. No chewing tobacco. No alcohol. . Lives with family -  and kids.     Family history: No family history of parotid masses    MEDICATIONS:     Current Outpatient Medications   Medication Sig Dispense Refill    ferrous gluconate (FERGON) 324 (38 Fe) MG tablet Take iron pill once daily with food for 7 days/month during her period 21 tablet 4    levothyroxine (SYNTHROID/LEVOTHROID) 75 MCG tablet Take 1 tablet (75 mcg) by mouth daily. 90 tablet 3    Vitamin D3 (CHOLECALCIFEROL) 125 MCG (5000 UT) tablet Take 1 tablet by mouth daily (Patient not taking: Reported on 8/22/2024)         ALLERGIES:    Allergies   Allergen Reactions    Dust Mite Extract Hives    Grass Extracts [Gramineae Pollens]  Hives    Shellfish Allergy Hives       HABITS/SOCIAL HISTORY:   Smoked, quit in 2008, about 1/2 ppd, about 10 years. No chewing tobacco. No alcohol.   .   Lives with family -  and kids.     Social History     Socioeconomic History    Marital status: Single     Spouse name: Not on file    Number of children: Not on file    Years of education: Not on file    Highest education level: Not on file   Occupational History    Not on file   Tobacco Use    Smoking status: Former     Current packs/day: 0.00     Types: Cigarettes     Quit date: 2009     Years since quitting: 15.7     Passive exposure: Never    Smokeless tobacco: Former     Quit date: 8/12/2014    Tobacco comments:     No exposure to second hand smoking.   Vaping Use    Vaping status: Never Used   Substance and Sexual Activity    Alcohol use: No    Drug use: No    Sexual activity: Not Currently     Birth control/protection: None     Comment: Does not have a partner at the moment   Other Topics Concern    Not on file   Social History Narrative    ; 4 children - (23, 21, 17, 14)       Social Determinants of Health     Financial Resource Strain: Low Risk  (2/14/2024)    Financial Resource Strain     Within the past 12 months, have you or your family members you live with been unable to get utilities (heat, electricity) when it was really needed?: No   Food Insecurity: Low Risk  (2/14/2024)    Food Insecurity     Within the past 12 months, did you worry that your food would run out before you got money to buy more?: No     Within the past 12 months, did the food you bought just not last and you didn t have money to get more?: No   Transportation Needs: Low Risk  (2/14/2024)    Transportation Needs     Within the past 12 months, has lack of transportation kept you from medical appointments, getting your medicines, non-medical meetings or appointments, work, or from getting things that you need?: No   Physical Activity: Insufficiently Active  (2/14/2024)    Exercise Vital Sign     Days of Exercise per Week: 3 days     Minutes of Exercise per Session: 30 min   Stress: Stress Concern Present (2/14/2024)    [place] [employer] [employer] - Occupational Stress Questionnaire     Feeling of Stress : To some extent   Social Connections: Unknown (2/14/2024)    Social Connection and Isolation Panel [NHANES]     Frequency of Communication with Friends and Family: Not on file     Frequency of Social Gatherings with Friends and Family: Once a week     Attends [employer] Services: Not on file     Active Member of Clubs or Organizations: Not on file     Attends Club or Organization Meetings: Not on file     Marital Status: Not on file   Interpersonal Safety: Low Risk  (2/15/2024)    Interpersonal Safety     Do you feel physically and emotionally safe where you currently live?: Yes     Within the past 12 months, have you been hit, slapped, kicked or otherwise physically hurt by someone?: No     Within the past 12 months, have you been humiliated or emotionally abused in other ways by your partner or ex-partner?: No   Housing Stability: High Risk (2/14/2024)    Housing Stability     Do you have housing? : No     Are you worried about losing your housing?: No       PAST MEDICAL HISTORY:   Past Medical History:   Diagnosis Date    Head injury, closed, sequela 1/16/2018    Heartburn     History of angina     History of blood transfusion     Iron deficiency anemia due to chronic blood loss 11/25/2022    Motion sickness     Seasonal allergies     Thyroid disease         PAST SURGICAL HISTORY:   Past Surgical History:   Procedure Laterality Date    BREAST CYST INCISION AND DRAINAGE Left     sebacous cyst, Dr. Sidhu    COLONOSCOPY N/A 2/24/2023    Procedure: COLONOSCOPY;  Surgeon: Montana Tadeo MD;  Location:  GI    LAPAROSCOPIC CHOLECYSTECTOMY N/A 1/25/2022    Procedure: CHOLECYSTECTOMY, LAPAROSCOPIC;  Surgeon: Julissa Mcmullen DO;  Location: [facility]  "      FAMILY HISTORY:    Family History   Problem Relation Age of Onset    Diabetes Mother     Hypertension Mother     Hyperlipidemia Mother     Uterine Cancer Mother         pre cancer    Hyperlipidemia Father     No Known Problems Sister     No Known Problems Sister     No Known Problems Sister     No Known Problems Sister     Liver Disease Brother         fatty liver    IgA nephropathy Brother         on dialysis    Hypertension Brother     Hepatitis Maternal Grandmother     Hypertension Maternal Grandmother     IgA nephropathy Daughter     No Known Problems Son     Coronary Artery Disease No family hx of     Breast Cancer No family hx of     Cancer - colorectal No family hx of     Ovarian Cancer No family hx of     Prostate Cancer No family hx of     Other Cancer No family hx of     Mental Illness No family hx of     Cerebrovascular Disease No family hx of     Anesthesia Reaction No family hx of     Asthma No family hx of     Osteoporosis No family hx of     Known Genetic Syndrome No family hx of     Obesity No family hx of     Unknown/Adopted No family hx of        REVIEW OF SYSTEMS:  12 point ROS was negative other than the symptoms noted above in the HPI.  Patient Supplied Answers to Review of Systems      10/9/2024    12:13 PM    ENT ROS   Constitutional Problems with sleep   Neurology Dizzy spells    Headache   Psychology Frequently feeling anxious   Endocrine Heat or cold intolerance         PHYSICAL EXAMINATION:   BP (!) 142/87 (BP Location: Right arm, Patient Position: Sitting, Cuff Size: Adult Large)   Pulse 75   Ht 1.499 m (4' 11\")   Wt 93.4 kg (206 lb)   SpO2 98%   BMI 41.61 kg/m    Appearance:   normal; NAD, age-appropriate appearance, well-developed, obese   Communication:   normal; communicates verbally, normal voice quality   Head/Face:   inspection -  Normal; no scars or visible lesions   Palpation - no facial numbness   Salivary glands - fullness of inferior right parotid, hard to discern " borders of mass   Facial strength -  Normal and symmetric bilateral; H/B I/VI   Skin:  normal, no rash   Ears:  auricle (AD) -  normal  EAC (AD) -  normal  TM (AD) -  Normal, no effusion  auricle (AS) -  normal  EAC (AS) -  normal  TM (AS) -  Normal, no effusion  Normal clinical speech reception   Nose:  Ext. inspection -  Normal   Oral Cavity:  lips -  Normal mucosa, oral competence, and stoma size   Age-appropriate dentition, healthy gingival mucosa   Hard palate, buccal, floor of mouth mucosa normal   Tongue - normal movement, no lesions   Neck: No visible mass or asymmetry   Normal palpation  Normal range of motion   Lymphatic:  no abnormal nodes   Cardiovascular:  warm, pink, well-perfused extremities without swelling, tenderness, or edema   Respiratory:  Normal respiratory effort, no stridor   Neuro/Psych.:  mood/affect -  normal  mental status -  normal         PROCEDURES:     RESULTS REVIEWED:   I reviewed note from PCP x 3, U/S report, outside path report, university path report      IMPRESSION AND PLAN:   Anish Hauser is a 44 year old woman with a right parotid mass.    We discussed that outside pathology read as pleomorphic adenoma which is benign but university review was a SUMP. We discussed that given this finding I would prefer an attempt at repeat biopsy to try to get definitive diagnosis since benign vs malignant pathology would . She is amenable to the repeat biopsy.    We will obtain a CT neck with contrast to better evaluate the mass and assist with surgical planning.     I explained the parotidectomy procedure in detail.  We reviewed the modified Neftali incision.  I spent time counseling her on the risks of greater auricular nerve damage and the consequence of numbness of the ear.  The reviewed risks associated with the parotidectomy such as bleeding, infection, scarring, concavity or cosmetic changes, Arben syndrome, salivary leakage. The facial nerve was discussed in detail  including the risk of permanent and temporary weakness along with the consequences of each.  I would likely plan on placement of a NOY drain following the surgery.    We discussed the twitching in the head and above the ear, and especially the body are unrelated to the mass.     She is interested in surgery for the mass regardless of pathology. Preoperative teaching done. Will place OR orders once repeat biopsy performed. Will schedule CT scan while in clinic today. We will contact her with the biopsy results.      Thank you very much for the opportunity to participate in the care of your patient.      Iris Max MD  Otolaryngology- Head & Neck Surgery      This note was dictated with voice recognition software and then edited. Please excuse any unintentional errors.         CC:  Romelia Spann MD  [address] [place] [id number]

## 2024-10-11 ENCOUNTER — OFFICE VISIT (OUTPATIENT)
Dept: OTOLARYNGOLOGY | Facility: CLINIC | Age: 44
End: 2024-10-11
Attending: STUDENT IN AN ORGANIZED HEALTH CARE EDUCATION/TRAINING PROGRAM
Payer: COMMERCIAL

## 2024-10-11 ENCOUNTER — PRE VISIT (OUTPATIENT)
Dept: OTOLARYNGOLOGY | Facility: CLINIC | Age: 44
End: 2024-10-11

## 2024-10-11 VITALS
HEIGHT: 59 IN | SYSTOLIC BLOOD PRESSURE: 142 MMHG | OXYGEN SATURATION: 98 % | HEART RATE: 75 BPM | BODY MASS INDEX: 41.53 KG/M2 | WEIGHT: 206 LBS | DIASTOLIC BLOOD PRESSURE: 87 MMHG

## 2024-10-11 DIAGNOSIS — K11.8 PAROTID MASS: Primary | ICD-10-CM

## 2024-10-11 DIAGNOSIS — D11.0 PLEOMORPHIC ADENOMA OF PAROTID GLAND: ICD-10-CM

## 2024-10-11 PROCEDURE — 99204 OFFICE O/P NEW MOD 45 MIN: CPT | Performed by: OTOLARYNGOLOGY

## 2024-10-11 NOTE — LETTER
10/11/2024       RE: Anish Hauser  [address]  [place] [address]     Dear Colleague,    Thank you for referring your patient, Anish Hauser, to the [facility] [place] at [facility]. Please see a copy of my visit note below.    Dear Dr. Spann:    I had the pleasure of meeting Anish Hauser in consultation today at the [facility] at your request.     History of Present Illness:   Anish Hauser is a 44 year old woman referred for evaluation of a right parotid mass.    She saw her PCP in April 2024 with a 6-7 month history of a mass below the right ear. She was recommended for an U/S. She saw her PCP again in May 2024, recommended U/S again. U/S on 7/15/2024 demonstrated 1.8 x 1.7 x 1.5 cm mass in right parotid. She had an IR guided biopsy on 8/1/2024 which demonstrated pleomorphic adenoma on outside read, [place] read [facility].    She has a history of right facial twitch, initially in eye then also face and rest of body.     She noticed the mass herself. She does not think it has changed in size from first noticing it. She has history of right eyelid twitching that has resolved, now has twitching in her head and ear. She also has them throughout her whole body. She has no ear pain. She has no facial numbness.       Past medical history: hypertension, Hashimoto thyroiditis     Past surgical history: cholecystectomy (no bleeding or anesthesia issues)    Social history: Smoked, quit in 2008, about 1/2 ppd, about 10 years. No chewing tobacco. No alcohol. . Lives with family -  and kids.     Family history: No family history of parotid masses    MEDICATIONS:     Current Outpatient Medications   Medication Sig Dispense Refill     ferrous gluconate (FERGON) 324 (38 Fe) MG tablet Take iron pill once daily with food for 7 days/month during her period 21 tablet 4     levothyroxine (SYNTHROID/LEVOTHROID) 75  MCG tablet Take 1 tablet (75 mcg) by mouth daily. 90 tablet 3     Vitamin D3 (CHOLECALCIFEROL) 125 MCG (5000 UT) tablet Take 1 tablet by mouth daily (Patient not taking: Reported on 8/22/2024)         ALLERGIES:    Allergies   Allergen Reactions     Dust Mite Extract Hives     Grass Extracts [Gramineae Pollens] Hives     Shellfish Allergy Hives       HABITS/SOCIAL HISTORY:   Smoked, quit in 2008, about 1/2 ppd, about 10 years. No chewing tobacco. No alcohol.   .   Lives with family -  and kids.     Social History     Socioeconomic History     Marital status: Single     Spouse name: Not on file     Number of children: Not on file     Years of education: Not on file     Highest education level: Not on file   Occupational History     Not on file   Tobacco Use     Smoking status: Former     Current packs/day: 0.00     Types: Cigarettes     Quit date: 2009     Years since quitting: 15.7     Passive exposure: Never     Smokeless tobacco: Former     Quit date: 8/12/2014     Tobacco comments:     No exposure to second hand smoking.   Vaping Use     Vaping status: Never Used   Substance and Sexual Activity     Alcohol use: No     Drug use: No     Sexual activity: Not Currently     Birth control/protection: None     Comment: Does not have a partner at the moment   Other Topics Concern     Not on file   Social History Narrative    ; 4 children - (23, 21, 17, 14)       Social Determinants of Health     Financial Resource Strain: Low Risk  (2/14/2024)    Financial Resource Strain      Within the past 12 months, have you or your family members you live with been unable to get utilities (heat, electricity) when it was really needed?: No   Food Insecurity: Low Risk  (2/14/2024)    Food Insecurity      Within the past 12 months, did you worry that your food would run out before you got money to buy more?: No      Within the past 12 months, did the food you bought just not last and you didn t have money to  get more?: No   Transportation Needs: Low Risk  (2/14/2024)    Transportation Needs      Within the past 12 months, has lack of transportation kept you from medical appointments, getting your medicines, non-medical meetings or appointments, work, or from getting things that you need?: No   Physical Activity: Insufficiently Active (2/14/2024)    Exercise Vital Sign      Days of Exercise per Week: 3 days      Minutes of Exercise per Session: 30 min   Stress: Stress Concern Present (2/14/2024)    Citizen of [employer] [place] [employer] Occupational [employer] - Occupational Stress Questionnaire      Feeling of Stress : To some extent   Social Connections: Unknown (2/14/2024)    Social Connection and Isolation Panel [NHANES]      Frequency of Communication with Friends and Family: Not on file      Frequency of Social Gatherings with Friends and Family: Once a week      Attends Congregational Services: Not on file      Active Member of Clubs or Organizations: Not on file      Attends Club or Organization Meetings: Not on file      Marital Status: Not on file   Interpersonal Safety: Low Risk  (2/15/2024)    Interpersonal Safety      Do you feel physically and emotionally safe where you currently live?: Yes      Within the past 12 months, have you been hit, slapped, kicked or otherwise physically hurt by someone?: No      Within the past 12 months, have you been humiliated or emotionally abused in other ways by your partner or ex-partner?: No   Housing Stability: High Risk (2/14/2024)    Housing Stability      Do you have housing? : No      Are you worried about losing your housing?: No       PAST MEDICAL HISTORY:   Past Medical History:   Diagnosis Date     Head injury, closed, sequela 1/16/2018     Heartburn      History of angina      History of blood transfusion      Iron deficiency anemia due to chronic blood loss 11/25/2022     Motion sickness      Seasonal allergies      Thyroid disease         PAST SURGICAL HISTORY:   Past Surgical History:    Procedure Laterality Date     BREAST CYST INCISION AND DRAINAGE Left     sebacous cyst, Dr. Sidhu     COLONOSCOPY N/A 2/24/2023    Procedure: COLONOSCOPY;  Surgeon: Montana Tadeo MD;  Location:  GI     LAPAROSCOPIC CHOLECYSTECTOMY N/A 1/25/2022    Procedure: CHOLECYSTECTOMY, LAPAROSCOPIC;  Surgeon: Julissa Mcmullen DO;  Location: [facility]       FAMILY HISTORY:    Family History   Problem Relation Age of Onset     Diabetes Mother      Hypertension Mother      Hyperlipidemia Mother      Uterine Cancer Mother         pre cancer     Hyperlipidemia Father      No Known Problems Sister      No Known Problems Sister      No Known Problems Sister      No Known Problems Sister      Liver Disease Brother         fatty liver     IgA nephropathy Brother         on dialysis     Hypertension Brother      Hepatitis Maternal Grandmother      Hypertension Maternal Grandmother      IgA nephropathy Daughter      No Known Problems Son      Coronary Artery Disease No family hx of      Breast Cancer No family hx of      Cancer - colorectal No family hx of      Ovarian Cancer No family hx of      Prostate Cancer No family hx of      Other Cancer No family hx of      Mental Illness No family hx of      Cerebrovascular Disease No family hx of      Anesthesia Reaction No family hx of      Asthma No family hx of      Osteoporosis No family hx of      Known Genetic Syndrome No family hx of      Obesity No family hx of      Unknown/Adopted No family hx of        REVIEW OF SYSTEMS:  12 point ROS was negative other than the symptoms noted above in the HPI.  Patient Supplied Answers to Review of Systems      10/9/2024    12:13 PM   [facility] ENT ROS   Constitutional Problems with sleep   Neurology Dizzy spells    Headache   Psychology Frequently feeling anxious   Endocrine Heat or cold intolerance         PHYSICAL EXAMINATION:   BP (!) 142/87 (BP Location: Right arm, Patient Position: Sitting, Cuff Size: Adult Large)   Pulse 75   Ht  "1.499 m (4' 11\")   Wt 93.4 kg (206 lb)   SpO2 98%   BMI 41.61 kg/m    Appearance:   normal; NAD, age-appropriate appearance, well-developed, obese   Communication:   normal; communicates verbally, normal voice quality   Head/Face:   inspection -  Normal; no scars or visible lesions   Palpation - no facial numbness   Salivary glands - fullness of inferior right parotid, hard to discern borders of mass   Facial strength -  Normal and symmetric bilateral; H/B I/VI   Skin:  normal, no rash   Ears:  auricle (AD) -  normal  EAC (AD) -  normal  TM (AD) -  Normal, no effusion  auricle (AS) -  normal  EAC (AS) -  normal  TM (AS) -  Normal, no effusion  Normal clinical speech reception   Nose:  Ext. inspection -  Normal   Oral Cavity:  lips -  Normal mucosa, oral competence, and stoma size   Age-appropriate dentition, healthy gingival mucosa   Hard palate, buccal, floor of mouth mucosa normal   Tongue - normal movement, no lesions   Neck: No visible mass or asymmetry   Normal palpation  Normal range of motion   Lymphatic:  no abnormal nodes   Cardiovascular:  warm, pink, well-perfused extremities without swelling, tenderness, or edema   Respiratory:  Normal respiratory effort, no stridor   Neuro/Psych.:  mood/affect -  normal  mental status -  normal         PROCEDURES:     RESULTS REVIEWED:   I reviewed note from PCP x 3, U/S report, outside path report, university path report      IMPRESSION AND PLAN:   Anish Hauser is a 44 year old woman with a right parotid mass.    We discussed that outside pathology read as pleomorphic adenoma which is benign but university review was a SUMP. We discussed that given this finding I would prefer an attempt at repeat biopsy to try to get definitive diagnosis since benign vs malignant pathology would . She is amenable to the repeat biopsy.    We will obtain a CT neck with contrast to better evaluate the mass and assist with surgical planning.     I explained the " parotidectomy procedure in detail.  We reviewed the modified Neftali incision.  I spent time counseling her on the risks of greater auricular nerve damage and the consequence of numbness of the ear.  The reviewed risks associated with the parotidectomy such as bleeding, infection, scarring, concavity or cosmetic changes, Arben syndrome, salivary leakage. The facial nerve was discussed in detail including the risk of permanent and temporary weakness along with the consequences of each.  I would likely plan on placement of a NOY drain following the surgery.    We discussed the twitching in the head and above the ear, and especially the body are unrelated to the mass.     She is interested in surgery for the mass regardless of pathology. Preoperative teaching done. Will place OR orders once repeat biopsy performed. Will schedule CT scan while in clinic today. We will contact her with the biopsy results.      Thank you very much for the opportunity to participate in the care of your patient.      Iris Max MD  Otolaryngology- Head & Neck Surgery      This note was dictated with voice recognition software and then edited. Please excuse any unintentional errors.         CC:  Romelia Spann MD  [address] [place], [place] [id number]    Teaching Flowsheet - ENT   Relevant Diagnosis: parotid mass   Teaching Topic:Person(s) involved in teaching: patient        Motivation Level:  Asks Questions:   Yes  Eager to Learn:   Yes  Cooperative:   Yes  Receptive (willing/able to accept information):   Yes  Comments: Reviewed pre-op H and P,  NPO prior to  surgery,  pre-op scrub (given Hibiclens)  Reviewed post-op  cares , activity and pain.     Patient demonstrates understanding of the following:  Reason for the appointment, diagnosis and treatment plan:   Yes  Knowledge of proper use of medications and conditions for which they are ordered (with special attention to potential side effects or drug interactions):  stop aspirin products 1 week  before surgery Yes  Which situations necessitate calling provider and whom to contact:   Yes  Nutritional needs and diet plan:   Yes  Pain management techniques:   Yes  Patient instructed on hand hygiene:  Yes  How and/when to access community resources:   Yes     Infection Prevention:  Patient   demonstrates understanding of the following:  Surgical procedure site care taught Yes  Signs and symptoms of infection taught Yes  Wound care taught Yes  Instructional Materials Used/Given: verbal instruction.       Again, thank you for allowing me to participate in the care of your patient.      Sincerely,    Iris Max MD

## 2024-10-11 NOTE — PROGRESS NOTES
Teaching Flowsheet - ENT   Relevant Diagnosis: parotid mass   Teaching Topic:Person(s) involved in teaching: patient        Motivation Level:  Asks Questions:   Yes  Eager to Learn:   Yes  Cooperative:   Yes  Receptive (willing/able to accept information):   Yes  Comments: Reviewed pre-op H and P,  NPO prior to  surgery,  pre-op scrub (given Hibiclens)  Reviewed post-op  cares , activity and pain.     Patient demonstrates understanding of the following:  Reason for the appointment, diagnosis and treatment plan:   Yes  Knowledge of proper use of medications and conditions for which they are ordered (with special attention to potential side effects or drug interactions):  stop aspirin products 1 week before surgery Yes  Which situations necessitate calling provider and whom to contact:   Yes  Nutritional needs and diet plan:   Yes  Pain management techniques:   Yes  Patient instructed on hand hygiene:  Yes  How and/when to access community resources:   Yes     Infection Prevention:  Patient   demonstrates understanding of the following:  Surgical procedure site care taught Yes  Signs and symptoms of infection taught Yes  Wound care taught Yes  Instructional Materials Used/Given: verbal instruction.

## 2024-10-14 DIAGNOSIS — K11.8 PAROTID MASS: ICD-10-CM

## 2024-10-14 NOTE — CONSULTS
Outpatient Neuroradiology Biopsy Referral    Patient is a 45 y/o female with a PMH of HTN, hashimoto thyroiditis, right facial twitch, right parotid mass onset 6-7 months ago. Neuroradiology has been asked to biopsy a parotid mass, last biopsy [facility] 8/1/24 demonstrated pleomorphic adenoma, [facility] resulted SUMP. Requesting repeat biopsy for forward planning.     US 7/15/24 FINDINGS: There is a 1.8 x 1.7 x 1.5 cm hypoechoic focus with posterior acoustic enhancement in the region of the right parotid gland; this corresponds with the palpable mass. There is peripheral vascularity.    Patient not on AC at time of referral.     IMPRESSION:  1.  A likely cystic right parotid lesion corresponds with the palpable finding. Recommend FNA.    Case and imaging was reviewed with Dr. Ortiz from Neuroradiology and FNA biopsy of the right parotid mass is approved.    Procedure order, FNA placed.    If requesting team would like samples sent for anything else please enter them or notify Neuroradiology prior to scheduled procedure.    Primary team Dr. Winter OTT made aware of Neuroradiology recommendations via epic messaging.    MARKELL Ferro CNP  Interventional Radiology   IR on-call pager: [phone]

## 2024-10-19 ENCOUNTER — IMMUNIZATION (OUTPATIENT)
Dept: FAMILY MEDICINE | Facility: CLINIC | Age: 44
End: 2024-10-19
Payer: COMMERCIAL

## 2024-10-19 PROCEDURE — 90471 IMMUNIZATION ADMIN: CPT

## 2024-10-19 PROCEDURE — 90656 IIV3 VACC NO PRSV 0.5 ML IM: CPT

## 2024-10-24 ENCOUNTER — ANCILLARY PROCEDURE (OUTPATIENT)
Dept: CT IMAGING | Facility: CLINIC | Age: 44
End: 2024-10-24
Attending: OTOLARYNGOLOGY
Payer: COMMERCIAL

## 2024-10-24 DIAGNOSIS — D11.0 PLEOMORPHIC ADENOMA OF PAROTID GLAND: ICD-10-CM

## 2024-10-24 PROCEDURE — 70491 CT SOFT TISSUE NECK W/DYE: CPT | Mod: GC | Performed by: RADIOLOGY

## 2024-10-24 RX ORDER — IOPAMIDOL 755 MG/ML
90 INJECTION, SOLUTION INTRAVASCULAR ONCE
Status: COMPLETED | OUTPATIENT
Start: 2024-10-24 | End: 2024-10-24

## 2024-10-24 RX ADMIN — IOPAMIDOL 90 ML: 755 INJECTION, SOLUTION INTRAVASCULAR at 15:04

## 2024-10-24 NOTE — DISCHARGE INSTRUCTIONS
Bessie Villalobos is a 85 year old female patient.  Admitted on 06/21/2022  Seen today for neurological follow-up.    This is a 86-year-old female with history of multiple strokes atrial fibrillation, intraventricular hemorrhage and gastrointestinal hemorrhage, not on anticoagulation, remote history of seizures continuing on zonisamide admitted after unwitnessed fall in the bathroom what appeared to be mechanical in nature without a clear evidence of loss of consciousness but having some lightheadedness at the time.  There was no evidence of tonic-clonic movements, tongue biting or urinary incontinence.  Afterwards the daughter has noted says mild change in speech and a right facial droop.  No other focal weakness.  Initial brief mild confusion with a rapid recovery back to being alert oriented x4.     Neurological examination reveals patient has a slight slurring of speech with no evidence of word-finding difficulty he or understanding the spoken language, minimal facial asymmetry with minimal drooping on the right side compared to left side.  No evidence of focal weakness in upper or lower extremities bilaterally.      Interval history and findings:  06/21/2022    MRI BRAIN WO CONTRAST     CLINICAL HISTORY: Stroke follow-up     TECHNIQUE: The brain is studied in the sagittal axial and coronal planes  using long and short TR sequences.  Susceptibility weighted sequences,  FLAIR and diffusion-weighted sequences are performed.     COMPARISON: Noncontrast CT brain 6/19/2022, MRI brain 6/20/2021     FINDINGS:Restricted diffusion in the left temporal lobe compatible with  recent left temporal lobe infarct, series 6 images 35-39.  No mass effect,  mass, or abnormal extra-axial fluid collections.     On susceptibility weighted images compared with June 2021, interval  development of several punctate hypointense lesions, compatible with  microbleed right temporal lobe series 13 image 37, right basal ganglia  series 13  What to Expect When Having Contrast  ______________________________________________________________    For Adults and Children    What should I expect during the test?  We'll inject contrast dye into a vein. Contrast is a liquid with iodine in it. It shows up on X-rays. As we inject the contrast dye, you/your child may:  Feel warm or hot  Notice a metal taste in the mouth  Have minor stomach upset  Feel slight pressure near the bladder (lower belly) or kidneys (middle of the back)  These feelings are normal and will last about 1 to 3 minutes.    What to watch for  Please tell us if you notice any of these problems during the test:  Sneezing  Itching, hives, or swelling in the face  Hoarse voice  Breathing problems  Other new symptoms  We will work to treat any problems right away.    Serious reactions are rare. They can include: Irregular heartbeat, seizures, kidney failure, shock, tissue damage at the needle site, or death.     What should I do at home?    Water intake  If your kidneys are healthy (no kidney problems), drink extra water the day you get home. Water helps clear the contrast from the body, which reduces possible stress on the kidneys.  Encourage children to drink extra water throughout the day.   Adults should drink 4 extra glasses a day.  The contrast will pass out of the body in urine (pee) over the next 24 hours (1 day). You won't feel this. Your urine won't change color.  If you have kidney problems and take metformin, drink 4 to 8 large glasses of water for the next  2 days (48 hours) if you are not on fluid restriction  .  Medicines  If your kidneys are healthy (no kidney problems),  keep taking your usual medicines, including metformin for diabetes (Glucophage or Glucovance).  If you have kidney problems and take metformin:   - Don't take metformin for 2 days (48 hours) after the exam.  Contact your physician to ask about taking or restarting Metformin medication.    Pain and swelling  If you  image 44, posterior to the body of the left lateral ventricle and  into the subcortical white matter left parietal lobe, right cerebellar  hemisphere images 14 and 17, left cerebellar hemisphere image 19 and 22,  right brain stem image 31.     Diffuse bilateral cerebral cortical atrophy unchanged.  Prominence of the  lateral ventricles, unchanged.  Extensive bilateral periventricular T2  FLAIR white matter intensities compatible small vessel ischemic white  matter changes again demonstrated.  Series image 48,-50     Normal flow voids in the major intracranial vessels.     Paranasal sinuses and mastoid air cells are clear.  Orbits are symmetric.   Midline structures appear normal.           IMPRESSION:  1.  Restricted diffusion in the left temporal lobe compatible with recent  cerebral infarct.  No significant mass effect.  2.  Interval multiple punctate supratentorial and infratentorial  microhemorrhages, may be due to amyloid angiopathy with hypertensive  microhemorrhages.  Clinical correlation needed.  3.  Diffuse cerebral cortical atrophy and small vessel ischemic white  matter changes.          Echocardiogram:  Final Impressions  Normal left ventricular chamber size.  Normal left ventricular systolic function.  Left ventricular ejection fraction, 57%.  No left ventricular regional wall motion abnormalities.  Grade I diastolic dysfunction of the left ventricle (impaired relaxation pattern).  Normal right ventricular chamber size.  Normal right ventricular systolic function.  Mildly increased left atrial chamber size.  Mild aortic valve regurgitation.  Mild tricuspid valve regurgitation.  Agitated saline was injected through a peripheral vein and did not show evidence of a shunt.  Compared to the TTE performed on 2/7/2020, there are no significant changes.        Patient Active Problem List   Diagnosis   • Secondary localized osteoarthrosis, pelvic region and thigh   • Hip joint replacement by other means   •  notice pain and swelling at the needle site, try these tips:  Keep your arm elevated.  Hold a padded ice pack on the area for 15 minutes, then remove it for 15 minutes. Repeat this over the next 24 hours (1 day).       Extra instructions:    Who should I call for medical help?     When to call the nurse line  Call [phone] ([phone]) for any of the problems listed below. Tell the nurse you had contrast and describe the symptoms.  Hives, swelling, blistering, skin color changes or other new problems within 2 days (48 hours) of the exam.  Any problems at the injection site, such as:   - Blistering  - Pain that is getting worse  - Skin blanching (turning lighter or changing color)  - Tingling or a loss of feeling  - Any problem that seems to be spreading  Call 9-1-1 if you have:  Wheezing  Trouble breathing                Did this handout help? [email or web address]               Unspecified epilepsy without mention of intractable epilepsy   • Essential hypertension, benign   • Pure hypercholesterolemia   • CVA (cerebral vascular accident) (CMS/[facility])   • Verbal aphasia syndrome   • H/O: upper GI bleed   • Hypertensive crisis   • Slurred speech   • Paroxysmal atrial fibrillation (CMS/[facility])   • Intraventricular hemorrhage (CMS/[facility])   • Unspecified persistent mental disorders due to conditions classified elsewhere   • Urinary retention   • Cerebrovascular accident (CVA) due to embolism of cerebral artery (CMS/[facility])   • Pacemaker at end of battery life   • Premature atrial contractions   • Lightheadedness   • History of seizures   • Rosacea   • Low back pain   • TIA (transient ischemic attack)   • AMS (altered mental status)   • Stroke (CMS/[facility])   • Impaired mobility and ADLs   • Decreased hearing of both ears     Past Medical History:   Diagnosis Date   • Arterial ischemic stroke, vertebrobasilar, brainstem, remote, resolved 7/2013   • Asymptomatic postmenopausal status (age-related) (natural)    • Blood transfusion    • Cataract    • Cerebral infarction (CMS/[facility]) 2011   • Degeneration of intervertebral disc, site unspecified 03/19/2008    L2-L3 through L5-S1   • Diverticulitis     recurring   • DIZZINESS    • Epilepsy complicating pregnancy, childbirth, or the puerperium, unspecified as to episode of care or not applicable(649.40)    • Essential hypertension, benign    • Fracture    • GERD    • Hemorrhage of gastrointestinal tract, unspecified    • Intraventricular hemorrhage (CMS/[facility]) 11/2014   • Osteoarthritis gen'l    • Other and unspecified hyperlipidemia    • Pacemaker at end of battery life 1/4/2017    Pacemaker at end of battery life; Successful explant of loop recorder   • POLYP COLON    • Urinary retention 12/19/2014   • Urinary tract infection      Current Facility-Administered Medications   Medication Dose Route Frequency Provider Last Rate Last Admin   • ondansetron (ZOFRAN) injection  4 mg  4 mg Intravenous Q12H PRN Baltazar Piedra MD       • sodium chloride 0.9 % flush bag 25 mL  25 mL Intravenous PRN Jigar Alvarado PA-C       • sodium chloride (PF) 0.9 % injection 2 mL  2 mL Intracatheter 2 times per day Jigar Alvarado PA-C   2 mL at 06/21/22 0905   • [Held by provider] amLODIPine (NORVASC) tablet 5 mg  5 mg Oral Daily Hussain Linton MD       • aspirin tablet 325 mg  325 mg Oral Daily Hussain Linton MD   325 mg at 06/21/22 0901   • pravastatin (PRAVACHOL) tablet 40 mg  40 mg Oral Nightly Hussain Linton MD   40 mg at 06/20/22 2036   • [Held by provider] valsartan (DIOVAN) tablet 160 mg  160 mg Oral Daily Hussain Linton MD       • zonisamide (ZONEGRAN) capsule 100 mg  100 mg Oral Daily Hussain Linton MD   100 mg at 06/21/22 0903   • acetaminophen (TYLENOL) tablet 650 mg  650 mg Oral Q4H PRN Hussain Linton MD        Or   • acetaminophen (TYLENOL) suppository 650 mg  650 mg Rectal Q4H PRN Hussain Linton MD       • sodium chloride (NORMAL SALINE) 0.9 % bolus 500 mL  500 mL Intravenous PRN Hussain Linton MD       • enoxaparin (LOVENOX) injection 40 mg  40 mg Subcutaneous Daily Hussain Linton MD   40 mg at 06/21/22 0901     ALLERGIES:   Allergen Reactions   • Ace Inhibitors Other (See Comments)     Unknown reaction   • Zocor [Simvastatin] Other (See Comments)     Unknown reaction     Principal Problem:    Stroke (CMS/HCC)  Active Problems:    TIA (transient ischemic attack)    AMS (altered mental status)    Impaired mobility and ADLs    Decreased hearing of both ears    Blood pressure 104/53, pulse (!) 52, temperature 98.7 °F (37.1 °C), temperature source Temporal, resp. rate 18, height 5' 8\" (1.727 m), weight 81.5 kg (179 lb 10.8 oz), SpO2 94 %.    Subjective  Objective  Assessment & Plan  This is a 86-year-old female with history of multiple strokes atrial fibrillation, intraventricular hemorrhage and gastrointestinal hemorrhage, not on anticoagulation, remote history of seizures continuing on  zonisamide admitted after unwitnessed fall in the bathroom what appeared to be mechanical in nature without a clear evidence of loss of consciousness but having some lightheadedness at the time.right facial droop.  No other focal weakness.  Initial brief mild confusion with a rapid recovery back to being alert oriented x4.    Interval history shows patient has some increased right facial droop and expressive dysphasia    · Left temporal lobe cerebral infarct.    · Abnormal MRI scan of the brain showing;  Interval multiple punctate supratentorial and infratentorial  microhemorrhages  Possible amyloid angiopathy with hypertensive  microhemorrhages.   · Fall, with most likely in the setting of acute stroke  · Right hemiparesis face> arm  · Expressive dysphasia  · History of atrial fibrillation; no anticoagulation due to history of intraventricular hemorrhage and gastrointestinal bleed    Management plan recommendations:   · Repeat CT head today further evaluation of stroke extension versus hemorrhagic conversion or increasing edema.  · Continue with antiplatelet therapy as prescribed  · Stroke risk factor management.  · Continue 1 telemetry and consider Holter monitoring as an outpatient.  · OT PT SLP and rehab  · Diagnosis management plan was discussed with the patient's daughter who was present at the bedside today.      Ginny Ramos MD

## 2024-10-25 ENCOUNTER — VIRTUAL VISIT (OUTPATIENT)
Dept: FAMILY MEDICINE | Facility: CLINIC | Age: 44
End: 2024-10-25
Payer: COMMERCIAL

## 2024-10-25 DIAGNOSIS — N92.0 MENORRHAGIA WITH REGULAR CYCLE: ICD-10-CM

## 2024-10-25 DIAGNOSIS — Z23 ENCOUNTER FOR IMMUNIZATION: ICD-10-CM

## 2024-10-25 DIAGNOSIS — D50.0 IRON DEFICIENCY ANEMIA DUE TO CHRONIC BLOOD LOSS: ICD-10-CM

## 2024-10-25 DIAGNOSIS — R73.03 PREDIABETES: ICD-10-CM

## 2024-10-25 DIAGNOSIS — R25.3 FASCICULATION: Primary | ICD-10-CM

## 2024-10-25 PROCEDURE — 99214 OFFICE O/P EST MOD 30 MIN: CPT | Mod: 95 | Performed by: FAMILY MEDICINE

## 2024-10-25 RX ORDER — FERROUS GLUCONATE 324(38)MG
TABLET ORAL
Qty: 21 TABLET | Refills: 4 | Status: SHIPPED | OUTPATIENT
Start: 2024-10-25

## 2024-10-25 RX ORDER — TRANEXAMIC ACID 650 MG/1
TABLET ORAL
Qty: 20 TABLET | Refills: 11 | Status: SHIPPED | OUTPATIENT
Start: 2024-10-25

## 2024-10-25 NOTE — PROGRESS NOTES
"Anish is a 44 year old who is being evaluated via a billable video visit.    How would you like to obtain your AVS? MyChart  If the video visit is dropped, the invitation should be resent by: Text to cell phone: [phone]  Will anyone else be joining your video visit? No      Assessment & Plan     Iron deficiency anemia due to chronic blood loss  Refill - see note below to manage bleeding  - ferrous gluconate (FERGON) 324 (38 Fe) MG tablet  Dispense: 21 tablet; Refill: 4    Fasciculation  Unusual- differential involving metabolic issues affecting electrolytes, nerve disorders, central disorders, muscle disorders, infectious, malignancy, etc.  Workup started already wnl.  Continue workup with EMG and labs as below.  Follow-up with neurology scheduled already.  Add labs as needed.    - EMG  - CBC with platelets  - Ionized Calcium  - Comprehensive metabolic panel (BMP + Alb, Alk Phos, ALT, AST, Total. Bili, TP)  - TSH with free T4 reflex  - CK total  - ESR: Erythrocyte sedimentation rate  - CRP, inflammation  - Anti Nuclear [facility] IgG by IFA with Reflex  - Rheumatoid factor  - GM1 antibody panel  - Protein electrophoresis  - Protein Immunofixation Serum  - Protein electrophoresis, timed urine  - Protein immunofixation urine  - LYME DISEASE TOTAL ANTIBODIES WITH REFLEX TO CONFIRMATION  - HIV Antigen Antibody Combo  - PRIMARY CARE FOLLOW-UP SCHEDULING    Menorrhagia with regular cycle  Heavy bleeding for 3 days/month changing pad every 1-2 hours for full 3 days straight.  Trial of lysteda starting with 2 tabs bid and increase to tid as needed.  Use for 3-5 days.    - tranexamic acid (LYSTEDA) 650 MG tablet  Dispense: 20 tablet; Refill: 11    Prediabetes  Recheck labs.    - Hemoglobin A1c    Encounter for immunization  Reviewed vaccines she is due for.    - TDAP 7+ (ADACEL,BOOSTRIX)            BMI  Estimated body mass index is 41.61 kg/m  as calculated from the following:    Height as of 10/11/24: 1.499 m (4' 11\").    " Weight as of 10/11/24: 93.4 kg (206 lb).   Weight management plan: Discussed healthy diet and exercise guidelines        Loida Oconnell is a 44 year old, presenting for the following health issues:  ongoing twitching         10/25/2024     4:33 PM   Additional Questions   Roomed by m   Accompanied by self     History of Present Illness       Reason for visit:  Ongoing twitching and spasms in head and body    She eats 2-3 servings of fruits and vegetables daily.She consumes 0 sweetened beverage(s) daily.She exercises with enough effort to increase her heart rate 30 to 60 minutes per day.  She exercises with enough effort to increase her heart rate 3 or less days per week. She is missing 2 dose(s) of medications per week.  She is not taking prescribed medications regularly due to remembering to take.    Around the time of the lump she has started to get twitching and spasm in the top of hte head and around the right ear.  Lump is in right parotid.  ENT doesn't think it has anything to do with the lump.  Twitching can be throughout her body.  Comes and goes for minutes to hours and can be daily and can go days without.  Can be in abdomen, back, ribs, bottom of her feet, etc.  Started Feb/March 2024.  Lump was noted early 2024.  Sometimes can see her muscles twitching and sometimes she can't . Initially started in the right Cheek and eye lid and then the head and ear and then the rest of her body.  No weakness.  Has had some numbness in the fingers a few months abo but this went away.  No use of [place] beauty products to expose to heavy metals- no hobbies or work to metals.  No high lead for her kids .  No drooping in eye lids. Vision is stable- prescription is stable.       Getting lump biopsied next month.      Needs refill on iron  Periods every 1-2 months   Still very heavy for first 2-3 days.  Has to change a pad every 1-2 hours for first couple of days        Last visit with pcp 10/2023 for elevated bp without  htn and prediabetes   Since then- CPE with Dr Spann 2/204 reviewed.  Noting lump in right jaw line monitored initially.      ED visit 2/2024 for swelling in LE.      Follow-up 4/2024 with Dr Spann- high blood pressure dx.  Lisinopril 10mg daily.  Neck mass- ordered us.  Right eye lid twitching starting 2 months prior known iron deficiency anemia due to menstrual bleeding.      Follow-up 5/2024 with Dr Spann- bp got low with lisinopril so she stopped taking and bp was better.  Helped schedule us ordered prior.      Us 7/2024 showing suspicious mass.    Bx ordered.      7/2024 wseen for muscle twitching -normal labs.  Follow-up with neurology recommended.  Possible MRI.  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  Recommending repeat bx.  Neck CT recommended.  Pt interested in just removing the mass next.  Scheduled for 11/21/24.  Needs preop.             Objective           Vitals:  No vitals were obtained today due to virtual visit.    Physical Exam   GENERAL: alert and no distress  EYES: Eyes grossly normal to inspection.  No discharge or erythema, or obvious scleral/conjunctival abnormalities.  RESP: No audible wheeze, cough, or visible cyanosis.    SKIN: Visible skin clear. No significant rash, abnormal pigmentation or lesions.  NEURO: Cranial nerves grossly intact.  Mentation and speech appropriate for age.  PSYCH: Appropriate affect, tone, and pace of words          Video-Visit Details    Type of service:  Video Visit   Originating Location (pt. Location): Home    Distant Location (provider location):  On-site  Platform used for Video Visit: Emiliano  Signed Electronically by: Bibi Colunga MD

## 2024-10-28 ENCOUNTER — LAB (OUTPATIENT)
Dept: LAB | Facility: CLINIC | Age: 44
End: 2024-10-28
Attending: FAMILY MEDICINE
Payer: COMMERCIAL

## 2024-10-28 DIAGNOSIS — R73.03 PREDIABETES: ICD-10-CM

## 2024-10-28 DIAGNOSIS — R25.3 FASCICULATION: ICD-10-CM

## 2024-10-28 LAB
CA-I BLD-MCNC: 4.5 MG/DL (ref 4.4–5.2)
ERYTHROCYTE [DISTWIDTH] IN BLOOD BY AUTOMATED COUNT: 14.1 % (ref 10–15)
ERYTHROCYTE [SEDIMENTATION RATE] IN BLOOD BY WESTERGREN METHOD: 25 MM/HR (ref 0–20)
EST. AVERAGE GLUCOSE BLD GHB EST-MCNC: 111 MG/DL
HBA1C MFR BLD: 5.5 % (ref 0–5.6)
HCT VFR BLD AUTO: 38.3 % (ref 35–47)
HGB BLD-MCNC: 12.3 G/DL (ref 11.7–15.7)
HIV 1+2 AB+HIV1 P24 AG SERPL QL IA: NONREACTIVE
MCH RBC QN AUTO: 28.8 PG (ref 26.5–33)
MCHC RBC AUTO-ENTMCNC: 32.1 G/DL (ref 31.5–36.5)
MCV RBC AUTO: 90 FL (ref 78–100)
PLATELET # BLD AUTO: 227 10E3/UL (ref 150–450)
RBC # BLD AUTO: 4.27 10E6/UL (ref 3.8–5.2)
WBC # BLD AUTO: 5.9 10E3/UL (ref 4–11)

## 2024-10-28 PROCEDURE — 82330 ASSAY OF CALCIUM: CPT

## 2024-10-28 PROCEDURE — 86140 C-REACTIVE PROTEIN: CPT

## 2024-10-28 PROCEDURE — 83036 HEMOGLOBIN GLYCOSYLATED A1C: CPT

## 2024-10-28 PROCEDURE — 36415 COLL VENOUS BLD VENIPUNCTURE: CPT

## 2024-10-28 PROCEDURE — 87389 HIV-1 AG W/HIV-1&-2 AB AG IA: CPT

## 2024-10-28 PROCEDURE — 86431 RHEUMATOID FACTOR QUANT: CPT

## 2024-10-28 PROCEDURE — 85027 COMPLETE CBC AUTOMATED: CPT

## 2024-10-28 PROCEDURE — 84165 PROTEIN E-PHORESIS SERUM: CPT | Performed by: PATHOLOGY

## 2024-10-28 PROCEDURE — 80053 COMPREHEN METABOLIC PANEL: CPT

## 2024-10-28 PROCEDURE — 83516 IMMUNOASSAY NONANTIBODY: CPT | Mod: 90

## 2024-10-28 PROCEDURE — 82550 ASSAY OF CK (CPK): CPT

## 2024-10-28 PROCEDURE — 86618 LYME DISEASE ANTIBODY: CPT

## 2024-10-28 PROCEDURE — 99000 SPECIMEN HANDLING OFFICE-LAB: CPT

## 2024-10-28 PROCEDURE — 84155 ASSAY OF PROTEIN SERUM: CPT | Mod: 59

## 2024-10-28 PROCEDURE — 86334 IMMUNOFIX E-PHORESIS SERUM: CPT | Performed by: PATHOLOGY

## 2024-10-28 PROCEDURE — 85652 RBC SED RATE AUTOMATED: CPT

## 2024-10-28 PROCEDURE — 86038 ANTINUCLEAR ANTIBODIES: CPT

## 2024-10-28 PROCEDURE — 84443 ASSAY THYROID STIM HORMONE: CPT

## 2024-10-29 LAB
ALBUMIN SERPL BCG-MCNC: 3.9 G/DL (ref 3.5–5.2)
ALBUMIN SERPL ELPH-MCNC: 3.9 G/DL (ref 3.7–5.1)
ALP SERPL-CCNC: 68 U/L (ref 40–150)
ALPHA1 GLOB SERPL ELPH-MCNC: 0.3 G/DL (ref 0.2–0.4)
ALPHA2 GLOB SERPL ELPH-MCNC: 0.7 G/DL (ref 0.5–0.9)
ALT SERPL W P-5'-P-CCNC: 13 U/L (ref 0–50)
ANA SER QL IF: NEGATIVE
ANION GAP SERPL CALCULATED.3IONS-SCNC: 10 MMOL/L (ref 7–15)
AST SERPL W P-5'-P-CCNC: 19 U/L (ref 0–45)
B BURGDOR IGG+IGM SER QL: 0.3
B-GLOBULIN SERPL ELPH-MCNC: 1 G/DL (ref 0.6–1)
BILIRUB SERPL-MCNC: 0.7 MG/DL
BUN SERPL-MCNC: 8.9 MG/DL (ref 6–20)
CALCIUM SERPL-MCNC: 8.6 MG/DL (ref 8.8–10.4)
CHLORIDE SERPL-SCNC: 105 MMOL/L (ref 98–107)
CK SERPL-CCNC: 126 U/L (ref 26–192)
CREAT SERPL-MCNC: 0.84 MG/DL (ref 0.51–0.95)
CRP SERPL-MCNC: 12.6 MG/L
EGFRCR SERPLBLD CKD-EPI 2021: 88 ML/MIN/1.73M2
GAMMA GLOB SERPL ELPH-MCNC: 1.2 G/DL (ref 0.7–1.6)
GLUCOSE SERPL-MCNC: 89 MG/DL (ref 70–99)
HCO3 SERPL-SCNC: 23 MMOL/L (ref 22–29)
M PROTEIN SERPL ELPH-MCNC: 0 G/DL
POTASSIUM SERPL-SCNC: 4 MMOL/L (ref 3.4–5.3)
PROT PATTERN SERPL ELPH-IMP: NORMAL
PROT PATTERN SERPL IFE-IMP: NORMAL
PROT SERPL-MCNC: 7.4 G/DL (ref 6.4–8.3)
RHEUMATOID FACT SERPL-ACNC: <10 IU/ML
SODIUM SERPL-SCNC: 138 MMOL/L (ref 135–145)
TOTAL PROTEIN SERUM FOR ELP: 7.1 G/DL (ref 6.4–8.3)
TSH SERPL DL<=0.005 MIU/L-ACNC: 1.49 UIU/ML (ref 0.3–4.2)

## 2024-11-02 LAB
GM1 GANGL IGG SER IA-ACNC: 28 IV
GM1 GANGL IGM SER IA-ACNC: 4 IV

## 2024-11-21 ENCOUNTER — APPOINTMENT (OUTPATIENT)
Dept: MEDSURG UNIT | Facility: CLINIC | Age: 44
End: 2024-11-21
Attending: OTOLARYNGOLOGY
Payer: COMMERCIAL

## 2024-11-21 ENCOUNTER — APPOINTMENT (OUTPATIENT)
Dept: INTERVENTIONAL RADIOLOGY/VASCULAR | Facility: CLINIC | Age: 44
End: 2024-11-21
Attending: OTOLARYNGOLOGY
Payer: COMMERCIAL

## 2024-11-21 ENCOUNTER — HOSPITAL ENCOUNTER (OUTPATIENT)
Facility: CLINIC | Age: 44
Discharge: HOME | End: 2024-11-21
Attending: OTOLARYNGOLOGY | Admitting: OTOLARYNGOLOGY
Payer: COMMERCIAL

## 2024-11-21 VITALS
RESPIRATION RATE: 16 BRPM | BODY MASS INDEX: 41.63 KG/M2 | DIASTOLIC BLOOD PRESSURE: 74 MMHG | OXYGEN SATURATION: 99 % | TEMPERATURE: 99 F | SYSTOLIC BLOOD PRESSURE: 108 MMHG | WEIGHT: 206.13 LBS | HEART RATE: 68 BPM

## 2024-11-21 DIAGNOSIS — K11.8 PAROTID MASS: ICD-10-CM

## 2024-11-21 LAB — INR PPP: 0.94 (ref 0.85–1.15)

## 2024-11-21 PROCEDURE — 88172 CYTP DX EVAL FNA 1ST EA SITE: CPT | Mod: TC | Performed by: OTOLARYNGOLOGY

## 2024-11-21 PROCEDURE — 85610 PROTHROMBIN TIME: CPT

## 2024-11-21 PROCEDURE — 36415 COLL VENOUS BLD VENIPUNCTURE: CPT

## 2024-11-21 PROCEDURE — 10005 FNA BX W/US GDN 1ST LES: CPT

## 2024-11-21 PROCEDURE — 250N000009 HC RX 250: Performed by: STUDENT IN AN ORGANIZED HEALTH CARE EDUCATION/TRAINING PROGRAM

## 2024-11-21 PROCEDURE — 999N000132 HC STATISTIC PP CARE STAGE 1

## 2024-11-21 PROCEDURE — 88173 CYTOPATH EVAL FNA REPORT: CPT | Mod: 26 | Performed by: PATHOLOGY

## 2024-11-21 PROCEDURE — 10005 FNA BX W/US GDN 1ST LES: CPT | Mod: GC | Performed by: RADIOLOGY

## 2024-11-21 PROCEDURE — 999N000142 HC STATISTIC PROCEDURE PREP ONLY

## 2024-11-21 PROCEDURE — 88305 TISSUE EXAM BY PATHOLOGIST: CPT | Mod: TC | Performed by: OTOLARYNGOLOGY

## 2024-11-21 PROCEDURE — 88305 TISSUE EXAM BY PATHOLOGIST: CPT | Mod: 26 | Performed by: PATHOLOGY

## 2024-11-21 PROCEDURE — 88172 CYTP DX EVAL FNA 1ST EA SITE: CPT | Mod: 26 | Performed by: PATHOLOGY

## 2024-11-21 RX ORDER — LIDOCAINE 40 MG/G
CREAM TOPICAL
Status: DISCONTINUED | OUTPATIENT
Start: 2024-11-21 | End: 2024-11-21 | Stop reason: HOSPADM

## 2024-11-21 RX ORDER — NALOXONE HYDROCHLORIDE 0.4 MG/ML
0.4 INJECTION, SOLUTION INTRAMUSCULAR; INTRAVENOUS; SUBCUTANEOUS
Status: DISCONTINUED | OUTPATIENT
Start: 2024-11-21 | End: 2024-11-21 | Stop reason: HOSPADM

## 2024-11-21 RX ORDER — FENTANYL CITRATE 50 UG/ML
25-50 INJECTION, SOLUTION INTRAMUSCULAR; INTRAVENOUS EVERY 5 MIN PRN
Status: DISCONTINUED | OUTPATIENT
Start: 2024-11-21 | End: 2024-11-21 | Stop reason: HOSPADM

## 2024-11-21 RX ORDER — FLUMAZENIL 0.1 MG/ML
0.2 INJECTION, SOLUTION INTRAVENOUS
Status: DISCONTINUED | OUTPATIENT
Start: 2024-11-21 | End: 2024-11-21 | Stop reason: HOSPADM

## 2024-11-21 RX ORDER — NALOXONE HYDROCHLORIDE 0.4 MG/ML
0.2 INJECTION, SOLUTION INTRAMUSCULAR; INTRAVENOUS; SUBCUTANEOUS
Status: DISCONTINUED | OUTPATIENT
Start: 2024-11-21 | End: 2024-11-21 | Stop reason: HOSPADM

## 2024-11-21 RX ORDER — LISINOPRIL 5 MG/1
5 TABLET ORAL PRN
COMMUNITY

## 2024-11-21 RX ADMIN — LIDOCAINE HYDROCHLORIDE 7 ML: 10 INJECTION, SOLUTION EPIDURAL; INFILTRATION; INTRACAUDAL; PERINEURAL at 10:00

## 2024-11-21 ASSESSMENT — ACTIVITIES OF DAILY LIVING (ADL)
ADLS_ACUITY_SCORE: 0

## 2024-11-21 NOTE — IR NOTE
Neuroradiology Pre-Procedure Sedation Assessment   Time of Assessment: 9:22 AM    Expected Level: Moderate Sedation, Patient requesting local sedation, but informed her that she has the option for moderate sedation.    Indication: Sedation is required for the following type of Procedure: Biopsy    Sedation and procedural consent: Risks, benefits and alternatives were discussed with Patient    PO Intake: Appropriately NPO for procedure    ASA Class: Class 2 - MILD SYSTEMIC DISEASE, NO ACUTE PROBLEMS, NO FUNCTIONAL LIMITATIONS.    Mallampati: Grade 2:  Soft palate, base of uvula, tonsillar pillars, and portion of posterior pharyngeal wall visible    Lungs: Lungs Clear with good breath sounds bilaterally    Heart: Normal heart sounds and rate    History and physical reviewed and no updates needed. I have reviewed the lab findings, diagnostic data, medications, and the plan for sedation. I have determined this patient to be an appropriate candidate for the planned sedation and procedure and have reassessed the patient IMMEDIATELY PRIOR to sedation and procedure.    Benny Tillman MD

## 2024-11-21 NOTE — PROGRESS NOTES
Pt states the heaviness that she was experiencing in her head has resolved. Neuro Radiology Resident updated and here to see pt prior to discharge. Pt tolerated oral intake and ambulation. Pt voided. Discharge instructions reviewed, copy given to pt. GERMAINE lorenzana'chica. Pt will discharge home accompanied by .

## 2024-11-21 NOTE — IR NOTE
Patient Name: Anish Hauser  Medical Record Number: [id number]  Today's Date: 11/21/2024    Procedure: Right Parotid Gland Biopsy  Proceduralist: JONNATHAN Franklin MD, ADRIANNA Tillman MD   Pathology present: Yes    Procedure Start: 0958  Procedure end: 1025  Sedation medications administered:    1% Lidocaine local anesthetic only.    Report given to: Bibi DEMARCO RN  : No    Other Notes: Pt arrived to IR room 6 from . Consent reviewed. Pt denies any questions or concerns regarding procedure. Pt positioned supine and monitored per protocol. Pt tolerated procedure without any noted complications. Pt transferred back to .

## 2024-11-21 NOTE — PROGRESS NOTES
"Pt arrived on 2a post parotid biopsy. Dressing c/di. No pain at site. Pt states she feels her head does feel \"heavy\" with movement. Will continue to monitor closely. Pt eating and drinking. Family at bedside.   "

## 2024-11-21 NOTE — DISCHARGE INSTRUCTIONS
[facility]    Interventional Radiology  Patient Instructions Following Parotid Biopsy    AFTER YOU GO HOME  Drink plenty of fluids   Resume your regular diet, unless otherwise instructed by your Primary Physician  Relax and take it easy for 48 hours  DO NOT do any strenuous exercise or lifting (> 10 lbs) for at least 3 days following your procedure  Keep the dressing dry and in place for 24 hours. Replace with Band aid for 2 days.  Never leave a wet dressing in place.  Do not take a shower for at least 12 hours following your procedure. No tub bath, hot tub, or swimming for 5 days.  There should be minimum drainage from the biopsy site    CALL THE PHYSICIAN IF:  You start bleeding from the procedure site.  If you do start to bleed from that site, hold pressure on the site for a minimum of 10 minutes.  Your physician will tell you if you need to return to the hospital  You develop nausea or vomiting  You have excessive swelling, redness, or tenderness at the site  You have drainage that looks like it is infected.  You experience severe pain  You develop hives or a rash or unexplained itching  You develop shortness of breath  You develop a temperature of 101 degrees F or greater    ADDITIONAL INSTRUCTIONS: None    [facility] INTERVENTIONAL RADIOLOGY DEPARTMENT  Procedure Physician:     Dr. Franklin                  Date of procedure: November 21, 2024  Telephone Numbers: [phone]      Monday-Friday 7:30 am to 4:00 pm  [phone]    After 4:00 pm Monday-Friday, Weekends & Holidays. Ask for the Neuro Radiologist on call.  Someone is on call 24 hrs/day  [facility] toll free number: [phone] Monday-Friday 8:00 am to 4:30 pm  [facility] Emergency Dept: [phone]

## 2024-11-22 LAB
PATH REPORT.COMMENTS IMP SPEC: NORMAL
PATH REPORT.FINAL DX SPEC: NORMAL
PATH REPORT.GROSS SPEC: NORMAL
PATH REPORT.MICROSCOPIC SPEC OTHER STN: NORMAL
PATH REPORT.RELEVANT HX SPEC: NORMAL

## 2024-11-23 ENCOUNTER — PREP FOR PROCEDURE (OUTPATIENT)
Dept: OTOLARYNGOLOGY | Facility: CLINIC | Age: 44
End: 2024-11-23
Payer: COMMERCIAL

## 2024-11-23 DIAGNOSIS — K11.8 PAROTID MASS: Primary | ICD-10-CM

## 2024-11-23 RX ORDER — AMPICILLIN AND SULBACTAM 2; 1 G/1; G/1
3 INJECTION, POWDER, FOR SOLUTION INTRAMUSCULAR; INTRAVENOUS
OUTPATIENT
Start: 2024-11-23

## 2024-11-23 RX ORDER — AMPICILLIN AND SULBACTAM 1; .5 G/1; G/1
1.5 INJECTION, POWDER, FOR SOLUTION INTRAMUSCULAR; INTRAVENOUS SEE ADMIN INSTRUCTIONS
OUTPATIENT
Start: 2024-11-23

## 2024-11-23 RX ORDER — DEXAMETHASONE SODIUM PHOSPHATE 4 MG/ML
10 INJECTION, SOLUTION INTRA-ARTICULAR; INTRALESIONAL; INTRAMUSCULAR; INTRAVENOUS; SOFT TISSUE ONCE
OUTPATIENT
Start: 2024-11-23 | End: 2024-11-23

## 2024-12-05 ENCOUNTER — HOSPITAL ENCOUNTER (OUTPATIENT)
Facility: AMBULATORY SURGERY CENTER | Age: 44
End: 2024-12-05
Attending: OTOLARYNGOLOGY
Payer: COMMERCIAL

## 2024-12-05 ENCOUNTER — TELEPHONE (OUTPATIENT)
Dept: OTOLARYNGOLOGY | Facility: CLINIC | Age: 44
End: 2024-12-05
Payer: COMMERCIAL

## 2024-12-05 DIAGNOSIS — K11.8 PAROTID MASS: Primary | ICD-10-CM

## 2024-12-05 NOTE — TELEPHONE ENCOUNTER
Scheduled surgery with Dr. Max on 1/14/2025    Spoke with: Patient    Surgery is located at [facility] OR    Patient will be seen for their H&P by their PCP Bibi Colunga MD within 30 days of surgery - Confirmed PCP on file is up to date     Does patient need a consult before upcoming surgery? No    Anesthesia type: General    Requested Imaging required for surgery: No    Patient needs scheduled for their 1-2 week post op    Patient will receive their surgery packet via [email or web address] per their preference    Patient was not provided a start time for surgery & is aware they will receive this information 2-3 days before surgery    Additional comments: Patient was instructed to call back with any further questions or concerns.     Kenna Laurent on 12/5/2024 at 11:00 AM

## 2025-01-06 ENCOUNTER — OFFICE VISIT (OUTPATIENT)
Dept: FAMILY MEDICINE | Facility: CLINIC | Age: 45
End: 2025-01-06
Payer: COMMERCIAL

## 2025-01-06 VITALS
WEIGHT: 208 LBS | BODY MASS INDEX: 41.93 KG/M2 | DIASTOLIC BLOOD PRESSURE: 96 MMHG | HEART RATE: 70 BPM | OXYGEN SATURATION: 99 % | RESPIRATION RATE: 21 BRPM | TEMPERATURE: 97.3 F | HEIGHT: 59 IN | SYSTOLIC BLOOD PRESSURE: 134 MMHG

## 2025-01-06 DIAGNOSIS — E55.9 VITAMIN D DEFICIENCY: ICD-10-CM

## 2025-01-06 DIAGNOSIS — E06.3 HYPOTHYROIDISM DUE TO HASHIMOTO'S THYROIDITIS: ICD-10-CM

## 2025-01-06 DIAGNOSIS — Z23 ENCOUNTER FOR IMMUNIZATION: ICD-10-CM

## 2025-01-06 DIAGNOSIS — Z01.818 PREOP GENERAL PHYSICAL EXAM: ICD-10-CM

## 2025-01-06 DIAGNOSIS — D50.0 IRON DEFICIENCY ANEMIA DUE TO CHRONIC BLOOD LOSS: ICD-10-CM

## 2025-01-06 DIAGNOSIS — Z71.89 ADVANCED DIRECTIVES, COUNSELING/DISCUSSION: ICD-10-CM

## 2025-01-06 DIAGNOSIS — Z12.4 CERVICAL CANCER SCREENING: Primary | ICD-10-CM

## 2025-01-06 DIAGNOSIS — K11.8 PAROTID MASS: ICD-10-CM

## 2025-01-06 DIAGNOSIS — I10 PRIMARY HYPERTENSION: ICD-10-CM

## 2025-01-06 PROBLEM — F43.23 ADJUSTMENT DISORDER WITH MIXED ANXIETY AND DEPRESSED MOOD: Status: RESOLVED | Noted: 2020-08-17 | Resolved: 2025-01-06

## 2025-01-06 PROBLEM — R22.0 SWELLING, MASS, OR LUMP ON FACE: Status: RESOLVED | Noted: 2024-07-15 | Resolved: 2025-01-06

## 2025-01-06 LAB
ALBUMIN SERPL BCG-MCNC: 4.1 G/DL (ref 3.5–5.2)
ALP SERPL-CCNC: 72 U/L (ref 40–150)
ALT SERPL W P-5'-P-CCNC: 11 U/L (ref 0–50)
ANION GAP SERPL CALCULATED.3IONS-SCNC: 11 MMOL/L (ref 7–15)
AST SERPL W P-5'-P-CCNC: 22 U/L (ref 0–45)
ATRIAL RATE - MUSE: 69 BPM
BILIRUB SERPL-MCNC: 0.6 MG/DL
BUN SERPL-MCNC: 15.8 MG/DL (ref 6–20)
CALCIUM SERPL-MCNC: 9.1 MG/DL (ref 8.8–10.4)
CHLORIDE SERPL-SCNC: 102 MMOL/L (ref 98–107)
CREAT SERPL-MCNC: 0.93 MG/DL (ref 0.51–0.95)
DIASTOLIC BLOOD PRESSURE - MUSE: NORMAL MMHG
EGFRCR SERPLBLD CKD-EPI 2021: 78 ML/MIN/1.73M2
ERYTHROCYTE [DISTWIDTH] IN BLOOD BY AUTOMATED COUNT: 14 % (ref 10–15)
FERRITIN SERPL-MCNC: 40 NG/ML (ref 6–175)
GLUCOSE SERPL-MCNC: 89 MG/DL (ref 70–99)
HCG UR QL: NEGATIVE
HCO3 SERPL-SCNC: 24 MMOL/L (ref 22–29)
HCT VFR BLD AUTO: 39.3 % (ref 35–47)
HGB BLD-MCNC: 12.4 G/DL (ref 11.7–15.7)
INTERPRETATION ECG - MUSE: NORMAL
MCH RBC QN AUTO: 28.1 PG (ref 26.5–33)
MCHC RBC AUTO-ENTMCNC: 31.6 G/DL (ref 31.5–36.5)
MCV RBC AUTO: 89 FL (ref 78–100)
P AXIS - MUSE: 23 DEGREES
PLATELET # BLD AUTO: 260 10E3/UL (ref 150–450)
POTASSIUM SERPL-SCNC: 3.9 MMOL/L (ref 3.4–5.3)
PR INTERVAL - MUSE: 156 MS
PROT SERPL-MCNC: 7.3 G/DL (ref 6.4–8.3)
QRS DURATION - MUSE: 84 MS
QT - MUSE: 434 MS
QTC - MUSE: 465 MS
R AXIS - MUSE: 16 DEGREES
RBC # BLD AUTO: 4.42 10E6/UL (ref 3.8–5.2)
SODIUM SERPL-SCNC: 137 MMOL/L (ref 135–145)
SYSTOLIC BLOOD PRESSURE - MUSE: NORMAL MMHG
T AXIS - MUSE: -11 DEGREES
VENTRICULAR RATE- MUSE: 69 BPM
VIT D+METAB SERPL-MCNC: 29 NG/ML (ref 20–50)
WBC # BLD AUTO: 6.3 10E3/UL (ref 4–11)

## 2025-01-06 PROCEDURE — 82728 ASSAY OF FERRITIN: CPT | Performed by: FAMILY MEDICINE

## 2025-01-06 PROCEDURE — 81025 URINE PREGNANCY TEST: CPT | Performed by: FAMILY MEDICINE

## 2025-01-06 PROCEDURE — 36415 COLL VENOUS BLD VENIPUNCTURE: CPT | Performed by: FAMILY MEDICINE

## 2025-01-06 PROCEDURE — 82306 VITAMIN D 25 HYDROXY: CPT | Performed by: FAMILY MEDICINE

## 2025-01-06 PROCEDURE — 93005 ELECTROCARDIOGRAM TRACING: CPT | Performed by: FAMILY MEDICINE

## 2025-01-06 PROCEDURE — 99214 OFFICE O/P EST MOD 30 MIN: CPT | Performed by: FAMILY MEDICINE

## 2025-01-06 PROCEDURE — 85027 COMPLETE CBC AUTOMATED: CPT | Performed by: FAMILY MEDICINE

## 2025-01-06 PROCEDURE — G0145 SCR C/V CYTO,THINLAYER,RESCR: HCPCS | Performed by: FAMILY MEDICINE

## 2025-01-06 PROCEDURE — 80053 COMPREHEN METABOLIC PANEL: CPT | Performed by: FAMILY MEDICINE

## 2025-01-06 PROCEDURE — 93010 ELECTROCARDIOGRAM REPORT: CPT | Performed by: INTERNAL MEDICINE

## 2025-01-06 PROCEDURE — 87624 HPV HI-RISK TYP POOLED RSLT: CPT | Performed by: FAMILY MEDICINE

## 2025-01-06 NOTE — PROGRESS NOTES
"NEUROLOGY CONSULTATION NOTE       [facility] NEUROLOG[facility] [place]  [address] [place] [address]  Tel: [phone]  Fax: [phone]  [email or web address]     Anish Hauser  1980, MRN [id number]  PCP: Bibi Colunga  Date: 2025     ASSESSMENT & PLAN     Visit Diagnosis  Myoclonic jerking     Subjective muscle twitching  44-year-old female with history of morbid obesity, hypothyroidism, prediabetes, pleomorphic parotid adenoma who was referred for evaluation of muscle twitches that she notices \"inside her brain and right ear\".  She also reports twitches on the right side of the body but cannot see it.  She has pleomorphic parotid adenoma and is scheduled for resection tomorrow and her symptoms could be due to underlying anxiety but simple partial seizure is a possibility and I have recommended:    1.  MRI brain  2.  EEG  3.  If above 2 tests are normal no further workup will be needed from neurology standpoint  4.  Follow-up the day she is scheduled for EEG    Thank you again for this referral, please feel free to contact me if you have any questions.    Juan Carlos Castillo MD  [facility]     REASON FOR CONSULTATION muscle twitching        HISTORY OF PRESENT ILLNESS     We have been requested by Sherri Lopes to evaluate Anish Hauser who is a 44 year old  female for myoclonic twitching    Patient is a 44-year-old female with history of morbid obesity, hypothyroidism, prediabetes, pleomorphic parotid adenoma who was referred for evaluation of muscle twitches.  Patient has somewhat peculiar description of her muscle twitches.  She reports that initially she noticed twitches around her right eye but subsequently started noticing muscle spasm inside her brain at times around her right ear and at random on the right side of the body.  She cannot see these twitches and has difficulty explaining what she means by muscle twitch in her brain.  She denies losing " consciousness, tonic-clonic activity or any bowel or bladder incontinence.  She had extensive workup that included comprehensive metabolic panel, vitamin D, ferritin, HIV, Lyme titer, SPEP, immunofixation, GM1 antibody, rheumatoid factor, antinuclear antibody, CK and ionized calcium that was normal.     PROBLEM LIST   Patient Active Problem List   Diagnosis    Morbid obesity (H)    Hypothyroidism due to Hashimoto's thyroiditis    Hidradenitis    Prediabetes    Iron deficiency anemia due to chronic blood loss    Family history of kidney disease    Primary hypertension    Vitamin D deficiency    Pleomorphic adenoma of parotid gland    Menorrhagia with regular cycle    Advanced directives, counseling/discussion    Parotid mass         PAST MEDICAL & SURGICAL HISTORY     Past Medical History:   Patient  has a past medical history of Head injury, closed, sequela (01/16/2018), Heartburn, History of angina, History of blood transfusion, Hypertension (2023), Iron deficiency anemia due to chronic blood loss (11/25/2022), Motion sickness, Seasonal allergies, and Thyroid disease.    Surgical History:  She  has a past surgical history that includes Breast Cyst Incision And Drainage (Left); Laparoscopic cholecystectomy (N/A, 1/25/2022); Colonoscopy (N/A, 2/24/2023); and IR Fine Needle Aspiration w Ultrasound (11/21/2024).     SOCIAL HISTORY     Reviewed, and she  reports that she quit smoking about 16 years ago. Her smoking use included cigarettes. She has never been exposed to tobacco smoke. She quit smokeless tobacco use about 10 years ago. She reports that she does not drink alcohol and does not use drugs.     FAMILY HISTORY     Reviewed, and family history includes Diabetes in her mother; Hepatitis in her maternal grandmother; Hyperlipidemia in her mother; Hyperparathyroidism in her daughter; Hypertension in her brother, maternal grandmother, and mother; IgA nephropathy in her brother and daughter; Liver Disease in her  "brother; No Known Problems in her father, sister, sister, sister, sister, and son; Uterine Cancer in her mother.     ALLERGIES     Allergies   Allergen Reactions    Dust Mite Extract Hives    Grass Extracts [Gramineae Pollens] Hives    Shellfish Allergy Hives         REVIEW OF SYSTEMS     A 12 point review of system was performed and was negative except as outlined in the history of present illness.     HOME MEDICATIONS     Current Outpatient Rx   Medication Sig Dispense Refill    ferrous gluconate (FERGON) 324 (38 Fe) MG tablet Take iron pill once daily with food for 7 days/month during her period 21 tablet 4    levothyroxine (SYNTHROID/LEVOTHROID) 75 MCG tablet Take 1 tablet (75 mcg) by mouth daily. 90 tablet 3    tranexamic acid (LYSTEDA) 650 MG tablet 2 tabs twice daily for up to 5 days/month during menstrual bleeding. 20 tablet 11         PHYSICAL EXAM     Vital signs  /82   Pulse 80   Ht 1.499 m (4' 11\")   Wt 94.3 kg (208 lb)   LMP 12/28/2024   BMI 42.01 kg/m      Weight:   208 lbs 0 oz    Patient is alert and oriented x4 in no acute distress. Vital signs were reviewed and are documented in electronic medical record. Neck was supple, no carotid bruits, thyromegaly, JVD, or lymphadenopathy was noted.   NEUROLOGY EXAM:   Patient s speech was normal with no aphasia or dysarthria. Mentation, and affect were also normal.    Funduscopic exam was normal, with normal cup to disc ratio. Cranial nerves II -XII were intact.    Patient had normal mass, tone and motor strength was 5/5 in all extremities without pronator drift.    Sensation was intact to light touch, pinprick, and vibratory sensation.    Reflexes were 1+ symmetrical with downgoing toes.    No dysmetria noted on FNF or HKS. Romberg was negative.   Gait testing was normal. Able to walk on toes/heels. Tandem walk normal.     PERTINENT DIAGNOSTIC STUDIES     Following studies were reviewed:     CT SOFT TISSUE NECK 10/24/2024  1. Right parotid gland " solid mass measuring 1.5 x 1.9 x 1.6 cm,  grossly stable compared to the ultrasound completed on 7/15/2024 given  differences in technique. Mass is located just deep to the traversing  right external jugular vein.  2. No additional masses or suspicious cervical lymphadenopathy.  3. 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     PERTINENT LABS  Following labs were reviewed:  Office Visit on 01/06/2025   Component Date Value Ref Range Status    Human Papilloma Virus 16 DNA 01/06/2025 Negative  Negative Final    Human Papilloma Virus 18 DNA 01/06/2025 Negative  Negative Final    Human Papilloma Virus Other 01/06/2025 Negative  Negative Final    FINAL DIAGNOSIS 01/06/2025    Final                    Value:This patient's sample is negative for high risk HPV DNA.          METHODOLOGY: The [employer] system uses automated extraction, simultaneous amplification of HPV (E6/E7 oncogenes) and beta-globin, followed by real time detection of fluorescent labeled HPV and beta globin using specific oligonucleotide probes. The test specifically identifies types HPV 16 DNA and HPV 18 DNA while concurrently detecting the rest of the high risk types (31, 33, 35, 39, 45, 51, 52, 56, 58, 59, 66 or 68).     COMMENTS: This test is not intended for use as a screening device for woman under age 30 with normal cervical cytology. Results should be correlated with cytologic and histologic findings. Close clinical follow up is recommended.    Please see the separate Gynecologic Cytology (Pap) report from the same collection date.      Ventricular Rate 01/06/2025 69  BPM Final    Atrial Rate 01/06/2025 69  BPM Final    HI Interval 01/06/2025 156  ms Final    QRS Duration 01/06/2025 84  ms Final    QT 01/06/2025 434  ms Final    QTc 01/06/2025 465  ms Final    P Axis 01/06/2025 23  degrees Final    R AXIS 01/06/2025 16  degrees Final    T Axis 01/06/2025 -11  degrees Final    Interpretation ECG  01/06/2025    Final                    Value:Sinus rhythm  Normal ECG  When compared with ECG of 24-Feb-2020 10:06,  No significant change was found  Confirmed by CONOR ARCOS, LES LOC:BORIS ([id number]) on 1/6/2025 10:54:06 AM      WBC Count 01/06/2025 6.3  4.0 - 11.0 10e3/uL Final    RBC Count 01/06/2025 4.42  3.80 - 5.20 10e6/uL Final    Hemoglobin 01/06/2025 12.4  11.7 - 15.7 g/dL Final    Hematocrit 01/06/2025 39.3  35.0 - 47.0 % Final    MCV 01/06/2025 89  78 - 100 fL Final    MCH 01/06/2025 28.1  26.5 - 33.0 pg Final    MCHC 01/06/2025 31.6  31.5 - 36.5 g/dL Final    RDW 01/06/2025 14.0  10.0 - 15.0 % Final    Platelet Count 01/06/2025 260  150 - 450 10e3/uL Final    Ferritin 01/06/2025 40  6 - 175 ng/mL Final    hCG Urine Qualitative 01/06/2025 Negative  Negative Final    Sodium 01/06/2025 137  135 - 145 mmol/L Final    Potassium 01/06/2025 3.9  3.4 - 5.3 mmol/L Final    Carbon Dioxide (CO2) 01/06/2025 24  22 - 29 mmol/L Final    Anion Gap 01/06/2025 11  7 - 15 mmol/L Final    Urea Nitrogen 01/06/2025 15.8  6.0 - 20.0 mg/dL Final    Creatinine 01/06/2025 0.93  0.51 - 0.95 mg/dL Final    GFR Estimate 01/06/2025 78  >60 mL/min/1.73m2 Final    Calcium 01/06/2025 9.1  8.8 - 10.4 mg/dL Final    Chloride 01/06/2025 102  98 - 107 mmol/L Final    Glucose 01/06/2025 89  70 - 99 mg/dL Final    Alkaline Phosphatase 01/06/2025 72  40 - 150 U/L Final    AST 01/06/2025 22  0 - 45 U/L Final    ALT 01/06/2025 11  0 - 50 U/L Final    Protein Total 01/06/2025 7.3  6.4 - 8.3 g/dL Final    Albumin 01/06/2025 4.1  3.5 - 5.2 g/dL Final    Bilirubin Total 01/06/2025 0.6  <=1.2 mg/dL Final    Vitamin D, Total (25-Hydroxy) 01/06/2025 29  20 - 50 ng/mL Final    Interpretation 01/06/2025 Negative for Intraepithelial Lesion or Malignancy (NILM)    Final    Comment 01/06/2025    Final                    Value:  Papanicolaou Test Limitations:  Cervical cytology is a screening test with limited sensitivity, and regular screening is critical for  cancer prevention.  Pap tests are primarily effective for the diagnosis/prevention of squamous cell carcinoma, not adenocarcinoma or other cancers.        Specimen Adequacy 01/06/2025 Satisfactory for evaluation, endocervical/transformation zone component present   Final    Clinical Information 01/06/2025    Final                    Value:none      Previous Abnormal? 01/06/2025    Final                    Value:No      Performing Labs 01/06/2025    Final                    Value:The technical component of this testing was completed at [facility].    Stain controls for all stains resulted within this report have been reviewed and show appropriate reactivity.      Associated HPV Report 01/06/2025    Final                    Value:Please see the associated HPV High Risk Types DNA Cervical report for Specimen [id number] from the same collection date.     Admission on 11/21/2024, Discharged on 11/21/2024   Component Date Value Ref Range Status    INR 11/21/2024 0.94  0.85 - 1.15 Final    Final Diagnosis 11/21/2024    Final                    Value:Specimen A     Interpretation:      Nondiagnostic     Adequacy:     Unsatisfactory for evaluation, Scant cellularity          Comment 11/21/2024    Final                    Value:There is insufficient tissue present for evaluation. Clinical correlation recommended with consideration of additional tissue sampling if clinically warranted.       Clinical Information 11/21/2024    Final                    Value:Right parotid mass.       Rapid Onsite Evaluation 11/21/2024    Final                    Value:FNA Performance:   Fine needle aspiration was not performed by [facility] staff.    Aspirate immediate study/adequacy:  ILISA DAVID MARSHALL, MD, attest that I immediately examined smears while the procedure was underway and determined or confirmed the adequacy of the specimens via telepathology.    It  is of note that the final assessment and report may be performed and signed by a different pathologist.    Onsite adequacy/interpretation:  A: Inadequate\      Gross Description 11/21/2024    Final                    Value:A. Parotid Gland, Right, Fine Needle Aspirate:  Received are 3 fixed slides, processed for Pap stain, 3 air dried slides, processed for Diff Quik stain, and material in formalin, processed for one hematoxylin stained cell block.      Microscopic Description 11/21/2024    Final                    Value:A microscopic examination was performed.     Case was reviewed by the following:  Resident Pathologist: Lenin Bryant MD  Pathology Fellow: Denice Kay MD  Pathology Fellow: Alicia Rivera DO  A resident or fellow in a training program was involved in the initial review, preparation, and/or interpretation of this case.  I, as the senior physician, attest that I have personally reviewed all specimens and or slides, including the listed special stains, and used them with my medical judgement to determine the final diagnosis.              Performing Labs 11/21/2024    Final                    Value:The technical component of this testing was completed at [facility].     Stain controls for all stains resulted within this report have been reviewed and show appropriate reactivity.      Lab on 10/28/2024   Component Date Value Ref Range Status    WBC Count 10/28/2024 5.9  4.0 - 11.0 10e3/uL Final    RBC Count 10/28/2024 4.27  3.80 - 5.20 10e6/uL Final    Hemoglobin 10/28/2024 12.3  11.7 - 15.7 g/dL Final    Hematocrit 10/28/2024 38.3  35.0 - 47.0 % Final    MCV 10/28/2024 90  78 - 100 fL Final    MCH 10/28/2024 28.8  26.5 - 33.0 pg Final    MCHC 10/28/2024 32.1  31.5 - 36.5 g/dL Final    RDW 10/28/2024 14.1  10.0 - 15.0 % Final    Platelet Count 10/28/2024 227  150 - 450 10e3/uL Final    Calcium Ionized Whole Blood  10/28/2024 4.5  4.4 - 5.2 mg/dL Final    Sodium 10/28/2024 138  135 - 145 mmol/L Final    Potassium 10/28/2024 4.0  3.4 - 5.3 mmol/L Final    Carbon Dioxide (CO2) 10/28/2024 23  22 - 29 mmol/L Final    Anion Gap 10/28/2024 10  7 - 15 mmol/L Final    Urea Nitrogen 10/28/2024 8.9  6.0 - 20.0 mg/dL Final    Creatinine 10/28/2024 0.84  0.51 - 0.95 mg/dL Final    GFR Estimate 10/28/2024 88  >60 mL/min/1.73m2 Final    Calcium 10/28/2024 8.6 (L)  8.8 - 10.4 mg/dL Final    Chloride 10/28/2024 105  98 - 107 mmol/L Final    Glucose 10/28/2024 89  70 - 99 mg/dL Final    Alkaline Phosphatase 10/28/2024 68  40 - 150 U/L Final    AST 10/28/2024 19  0 - 45 U/L Final    ALT 10/28/2024 13  0 - 50 U/L Final    Protein Total 10/28/2024 7.4  6.4 - 8.3 g/dL Final    Albumin 10/28/2024 3.9  3.5 - 5.2 g/dL Final    Bilirubin Total 10/28/2024 0.7  <=1.2 mg/dL Final    TSH 10/28/2024 1.49  0.30 - 4.20 uIU/mL Final    CK 10/28/2024 126  26 - 192 U/L Final    Erythrocyte Sedimentation Rate 10/28/2024 25 (H)  0 - 20 mm/hr Final    CRP Inflammation 10/28/2024 12.60 (H)  <5.00 mg/L Final    MALACHI interpretation 10/28/2024 Negative  Negative Final    Rheumatoid Factor 10/28/2024 <10  <14 IU/mL Final    GM1 Antibody IgG 10/28/2024 28  0 - 50 IV Final    GM1 Antibody IgM 10/28/2024 4  0 - 50 IV Final    Immunofixation ELP 10/28/2024 No monoclonal protein seen on immunofixation. Pathologic significance requires clinical correlation. Ruddy Nur MD   Final    Lyme Disease Antibodies Total 10/28/2024 0.30  <0.90 Final    HIV Antigen Antibody Combo 10/28/2024 Nonreactive  Nonreactive Final    Estimated Average Glucose 10/28/2024 111  <117 mg/dL Final    Hemoglobin A1C 10/28/2024 5.5  0.0 - 5.6 % Final    Total Protein Serum for ELP 10/28/2024 7.1  6.4 - 8.3 g/dL Final    Albumin 10/28/2024 3.9  3.7 - 5.1 g/dL Final    Alpha 1 10/28/2024 0.3  0.2 - 0.4 g/dL Final    Alpha 2 10/28/2024 0.7  0.5 - 0.9 g/dL Final    Beta Globulin 10/28/2024 1.0  0.6 -  1.0 g/dL Final    Gamma Globulin 10/28/2024 1.2  0.7 - 1.6 g/dL Final    Monoclonal Peak 10/28/2024 0.0  <=0.0 g/dL Final    ELP Interpretation 10/28/2024 Essentially normal electrophoretic pattern. No obvious monoclonal proteins seen. Pathologic significance requires clinical correlation. Ruddy Nur MD   Final        Total time spent for face to face visit, reviewing labs/imaging studies, counseling and coordination of care was: 1 Hour spent on the date of the encounter doing chart review, review of outside records, review of test results, interpretation of tests, patient visit, and documentation     This note was dictated using voice recognition software.  Any grammatical or context distortions are unintentional and inherent to the software.    Orders Placed This Encounter   Procedures    MR Brain w/o & w Contrast    EEG      New Prescriptions    No medications on file      Modified Medications    No medications on file

## 2025-01-06 NOTE — PROGRESS NOTES
Preoperative Evaluation  [facility] [address] [place] [address]  Phone: [phone]  Fax: [phone]  Primary Provider: Bibi Colunga MD  Pre-op Performing Provider: Bibi Colunga MD  Jan 6, 2025 1/6/2025   Surgical Information   What procedure is being done? EXCISION, PAROTID GLAND   Facility or Hospital where procedure/surgery will be performed: [address]   Who is doing the procedure / surgery? rIis Max   Date of surgery / procedure: 1/14/2025   Time of surgery / procedure: 7:00 AM / EXCISION, PAROTID GLAND   Where do you plan to recover after surgery? at home with family     Fax number for surgical facility: Note does not need to be faxed, will be available electronically in Epic.    Assessment & Plan     The proposed surgical procedure is considered INTERMEDIATE risk.    Cervical cancer screening  - HPV and Gynecologic Cytology Panel - Recommended Age 30 - 65 Years    Preop general physical exam  Okay for right parotid dissection for diagnosis of persistent parotid mass without clear pathology  - HCG qualitative urine  - EKG 12-lead, tracing only  - HCG qualitative urine    Body mass index (BMI) 40.0-44.9, adult (H)  Higher risk with general anesthesia.     Hypothyroidism due to Hashimoto's thyroiditis  Lab Results   Component Value Date    TSH 1.49 10/28/2024    TSH 2.09 02/11/2022   Stable on levothyroxine 75mcg daily.  Okay to take on am of surgery with sip of water.        Iron deficiency anemia due to chronic blood loss  Resolved with regular use of iron every 1-2 days - related to menorrhagia   - CBC with platelets  - Ferritin  - CBC with platelets  - Ferritin    Parotid mass  Okay for right parotid dissection for diagnostic purposes as scheduled.      Primary hypertension  BP Readings from Last 3 Encounters:   01/06/25 (!) 134/96   11/21/24 108/74   10/11/24 (!) 142/87        Uncontrolled today.  Plan  for bloodpressure management includes ongoing focus on healthy DASH type diet and increased activity, encouraged to avoid tobacco products and limit alcohol use, stress reduction, pt finds taking lisinopril 5mg daily to cause too much dizziness and her SBP to drop too low.  She will continue to hold this heading in to surgery- bp is not too high for procedure planned.  Pt is still menstruating and sexually active without birth control.  Would recommend alternative bp management - will address postop.  Labwork and meds ordered and reviewed as below  Potassium   Date Value Ref Range Status   10/28/2024 4.0 3.4 - 5.3 mmol/L Final   02/11/2022 4.0 3.5 - 5.0 mmol/L Final   03/08/2013 4.0 3.5 - 5.0 mmol/L Final      Creatinine   Date Value Ref Range Status   10/28/2024 0.84 0.51 - 0.95 mg/dL Final   03/08/2013 0.81 0.60 - 1.10 mg/dL Final        - EKG 12-lead, tracing only  - Comprehensive metabolic panel (BMP + Alb, Alk Phos, ALT, AST, Total. Bili, TP)  - Comprehensive metabolic panel (BMP + Alb, Alk Phos, ALT, AST, Total. Bili, TP)    Encounter for immunization  Pt refused today- doesn't want to get preop  - TDAP 10-64Y (ADACEL,BOOSTRIX)  - COVID-19 12+ (PFIZER)    Vitamin D deficiency  Not on supplement for several months now.  Check level.  Restart postop as needed.   - Vitamin D Deficiency    Advanced directives, counseling/discussion  Would trust her  but would want him to consult with her adult children.               Risks and Recommendations  The patient has the following additional risks and recommendations for perioperative complications:   - No identified additional risk factors other than previously addressed    Preoperative Medication Instructions  Antiplatelet or Anticoagulation Medication Instructions   - Patient is on no antiplatelet or anticoagulation medications.    Additional Medication Instructions  Take all scheduled medications on the day of surgery    Recommendation  Approval given to  proceed with proposed procedure, without further diagnostic evaluation.    Subjective   Anish is a 44 year old, presenting for the following:  Pre-Op Exam    Scared about general anesthesia or need to get this out or not.  Also worried about what this mass is.      Lump in right parotid noted winter 2024. Initially felt to be reactive LN. Ongoing symptoms and us ordered 4/2024 but pt didn't get scheduled until Ultrasound 7/2024 reviewed- showing:   IMPRESSION:  1.  A likely cystic right parotid lesion corresponds with the palpable finding. Recommend FNA.    FNA 8/2024:  Neoplasm, benign (SGBENIGN), Benign mixed tumor (pleomorphic adenoma). 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  Recommended repeat bx for more definitive dx.      Bx 11/2024 - unsatisfactory, scant cellularity.     Advised to move forward with surgery    Blood pressure goes up and down - the bottom number is the higher number.   Was given lisinopril 5mg daily but when she takes this her systolic blood pressure goes too low and she gets dizzy.     Taking iron daily for heavier period.   Didn't try the TXA yet- last period had heavy bleeding for 1st 2 days.  Had to change pad every 1-2 hours for 2 days.  Has to get up in middle of the night to change pad and sometimes will bleed on to bedsheets.  LMP 12/28/24    Hasn't taken vitamin D for about 4 months now because her level was normal.        1/6/2025     8:53 AM   Additional Questions   Roomed by M   Accompanied by self     HPI related to upcoming procedure: as above         1/6/2025   Pre-Op Questionnaire   Have you ever had a heart attack or stroke? No   Have you ever had surgery on your heart or blood vessels, such as a  stent placement, a coronary artery bypass, or surgery on an artery in your head, neck, heart, or legs? No   Do you have chest pain with activity? No   Do you have a history of heart failure? No   Do you currently have a cold, bronchitis or symptoms of other infection? No   Do you have a cough, shortness of breath, or wheezing? No   Do you or anyone in your family have previous history of blood clots? No   Do you or does anyone in your family have a serious bleeding problem such as prolonged bleeding following surgeries or cuts? No   Have you ever had problems with anemia or been told to take iron pills? (!) YES related to heavy periods- does take iron daily most of the month.  No anemia today   Have you had any abnormal blood loss such as black, tarry or bloody stools, or abnormal vaginal bleeding? (!) YES heavy periods periods    Have you ever had a blood transfusion? (!) YES   Have you ever had a transfusion reaction? No   Are you willing to have a blood transfusion if it is medically needed before, during, or after your surgery? Yes   Have you or any of your relatives ever had problems with anesthesia? No   Do you have sleep apnea, excessive snoring or daytime drowsiness? No   Do you have any artifical heart valves or other implanted medical devices like a pacemaker, defibrillator, or continuous glucose monitor? No   Do you have artificial joints? No   Are you allergic to latex? No     Health Care Directive  Patient does not have a Health Care Directive: Discussed advance care planning with patient; information given to patient to review.    Preoperative Review of    reviewed - no record of controlled substances prescribed.      Status of Chronic Conditions:  ANEMIA - Patient has a recent history of moderate-severe anemia, which has been symptomatic. Work up to date has revealed related to heavy periods. Treatment has been take iron daily.     HYPERTENSION - Patient has longstanding history of HTN ,  currently denies any symptoms referable to elevated blood pressure. Specifically denies chest pain, palpitations, dyspnea, orthopnea, PND or peripheral edema. Blood pressure readings have not been in normal range. Current medication regimen is as listed below. Patient denies any side effects of medication.     HYPOTHYROIDISM - Patient has a longstanding history of chronic Hypothyroidism. Patient has been doing well, noting no tremor, insomnia, hair loss or changes in skin texture. Continues to take medications as directed, without adverse reactions or side effects. Last TSH   Lab Results   Component Value Date    TSH 1.49 10/28/2024   .      Patient Active Problem List    Diagnosis Date Noted    Advanced directives, counseling/discussion 01/06/2025     Priority: Medium     Would trust her  to be POAHC as needed in consultion with adult children.        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     Priority: Medium    Vitamin D deficiency 02/20/2024     Priority: Medium    Family history of kidney disease 09/19/2023     Priority: Medium    Primary hypertension 09/19/2023     Priority: Medium    Iron deficiency anemia due to chronic blood loss 11/25/2022     Priority: Medium    Prediabetes 10/20/2021     Priority: Medium    Hypothyroidism due to Hashimoto's thyroiditis 10/14/2021     Priority: Medium    Hidradenitis 10/14/2021     Priority: Medium    Morbid obesity (H) 08/17/2020     Priority: Medium      Past Medical History:   Diagnosis Date    Head injury, closed, sequela 01/16/2018    Heartburn     History of angina     History of blood transfusion     Hypertension 2023    Iron deficiency anemia due to chronic blood loss 11/25/2022    Motion sickness     Seasonal allergies     Thyroid disease      Past Surgical History:   Procedure Laterality Date    BREAST CYST INCISION AND DRAINAGE Left      Dr. Nahum gauthier    COLONOSCOPY N/A 2023    Procedure: COLONOSCOPY;  Surgeon: Montana Tadeo MD;  Location:  GI    IR FINE NEEDLE ASPIRATION W ULTRASOUND  2024    LAPAROSCOPIC CHOLECYSTECTOMY N/A 2022    Procedure: CHOLECYSTECTOMY, LAPAROSCOPIC;  Surgeon: Julissa Mcmullen DO;  Location: [facility]     Current Outpatient Medications   Medication Sig Dispense Refill    ferrous gluconate (FERGON) 324 (38 Fe) MG tablet Take iron pill once daily with food for 7 days/month during her period 21 tablet 4    levothyroxine (SYNTHROID/LEVOTHROID) 75 MCG tablet Take 1 tablet (75 mcg) by mouth daily. 90 tablet 3    tranexamic acid (LYSTEDA) 650 MG tablet 2 tabs twice daily for up to 5 days/month during menstrual bleeding. 20 tablet 11    Vitamin D3 (CHOLECALCIFEROL) 125 MCG (5000 UT) tablet Take 1 tablet by mouth daily.         Allergies   Allergen Reactions    Dust Mite Extract Hives    Grass Extracts [Gramineae Pollens] Hives    Shellfish Allergy Hives        Social History     Tobacco Use    Smoking status: Former     Current packs/day: 0.00     Types: Cigarettes     Quit date:      Years since quittin.0     Passive exposure: Never    Smokeless tobacco: Former     Quit date: 2014    Tobacco comments:     No exposure to second hand smoking.   Substance Use Topics    Alcohol use: No     Family History   Problem Relation Age of Onset    Diabetes Mother     Hypertension Mother     Hyperlipidemia Mother     Uterine Cancer Mother         pre cancer    No Known Problems Father     No Known Problems Sister     No Known Problems Sister     No Known Problems Sister     No Known Problems Sister     Liver Disease Brother         fatty liver    IgA nephropathy Brother         on dialysis    Hypertension Brother     Hepatitis Maternal Grandmother     Hypertension Maternal Grandmother     IgA nephropathy Daughter     Hyperparathyroidism Daughter     No Known Problems Son     Coronary Artery  "Disease No family hx of     Breast Cancer No family hx of     Cancer - colorectal No family hx of     Ovarian Cancer No family hx of     Prostate Cancer No family hx of     Other Cancer No family hx of     Mental Illness No family hx of     Cerebrovascular Disease No family hx of     Anesthesia Reaction No family hx of     Asthma No family hx of     Osteoporosis No family hx of     Known Genetic Syndrome No family hx of     Obesity No family hx of     Unknown/Adopted No family hx of      History   Drug Use No             Review of Systems  Constitutional, neuro, ENT, endocrine, pulmonary, cardiac, gastrointestinal, genitourinary, musculoskeletal, integument and psychiatric systems are negative, except as otherwise noted.    Objective    BP (!) 134/96 (BP Location: Left arm, Patient Position: Sitting, Cuff Size: Adult Regular)   Pulse 70   Temp 97.3  F (36.3  C) (Temporal)   Resp 21   Ht 1.5 m (4' 11.06\")   Wt 94.3 kg (208 lb)   LMP 12/28/2024   SpO2 99%   BMI 41.93 kg/m     Estimated body mass index is 41.93 kg/m  as calculated from the following:    Height as of this encounter: 1.5 m (4' 11.06\").    Weight as of this encounter: 94.3 kg (208 lb).  Physical Exam  Complete 10 point ROS completed today as part of the exam and patient denies any symptoms as reviewed in HPI     Wt Readings from Last 3 Encounters:   01/06/25 94.3 kg (208 lb)   11/21/24 93.5 kg (206 lb 2.1 oz)   10/11/24 93.4 kg (206 lb)       Patient's last menstrual period was 12/28/2024.    All normal as below except abnormalities include: grossly normal exam   General is a  44 year old sitting comfortably in no apparent distress   HEENT:  TM are clear bilaterally.  Eye exams within normal   Neck: Supple without lymphadenopathy or thyromegally  CV: Regular rate and rhythm S1S2 without rubs, murmurs or gallops,   Lungs: Clear to auscultation bilaterally  Abd:  +BS, soft NT/ND,  No masses or organomegally,   Extremities: Warm, No Edema, 2+ Pedal " and radial pulses bilaterally  Skin: No lesions or rashes noted  Neuro: Able to ambulate around the exam room with equal movement, strength and normal coordination of the upper and lower extremeties symmetrically  Pelvic: Normal external genitalia.  Healthy normal vaginal mucosa.  Health appearing cervix.  Testing obtained without pain or difficulty.        History summarized from1-2:as above   Old Records-1: Outside allergies, meds, problems and immunizations were reconciled as needed from [facility]where  Radiology tests reviewed-1: as above   Lab tests reviewed-1: as above        Bibi Colunga MD     Recent Labs   Lab Test 11/21/24  0817 10/28/24  1629 05/24/24  1207   HGB  --  12.3 12.5   PLT  --  227 232   INR 0.94  --   --    NA  --  138 138   POTASSIUM  --  4.0 4.2   CR  --  0.84 0.94   A1C  --  5.5 5.6        Diagnostics  Recent Results (from the past week)   EKG 12-lead, tracing only    Collection Time: 01/06/25  9:09 AM   Result Value Ref Range    Systolic Blood Pressure  mmHg    Diastolic Blood Pressure  mmHg    Ventricular Rate 69 BPM    Atrial Rate 69 BPM    GA Interval 156 ms    QRS Duration 84 ms     ms    QTc 465 ms    P Axis 23 degrees    R AXIS 16 degrees    T Axis -11 degrees    Interpretation ECG       Sinus rhythm  Normal ECG  When compared with ECG of 24-Feb-2020 10:06,  No significant change was found     CBC with platelets    Collection Time: 01/06/25  9:28 AM   Result Value Ref Range    WBC Count 6.3 4.0 - 11.0 10e3/uL    RBC Count 4.42 3.80 - 5.20 10e6/uL    Hemoglobin 12.4 11.7 - 15.7 g/dL    Hematocrit 39.3 35.0 - 47.0 %    MCV 89 78 - 100 fL    MCH 28.1 26.5 - 33.0 pg    MCHC 31.6 31.5 - 36.5 g/dL    RDW 14.0 10.0 - 15.0 %    Platelet Count 260 150 - 450 10e3/uL   HCG qualitative urine    Collection Time: 01/06/25  9:28 AM   Result Value Ref Range    hCG Urine Qualitative Negative Negative       Revised Cardiac Risk Index (RCRI)  The patient has the following serious cardiovascular  risks for perioperative complications:   - No serious cardiac risks = 0 points     RCRI Interpretation: 0 points: Class I (very low risk - 0.4% complication rate)         Signed Electronically by: Bibi Colunga MD  A copy of this evaluation report is provided to the requesting physician.           Prior to immunization administration, verified patients identity using patient s name and date of birth. Please see Immunization Activity for additional information.     Screening Questionnaire for Adult Immunization    Are you sick today?   No   Do you have allergies to medications, food, a vaccine component or latex?   No   Have you ever had a serious reaction after receiving a vaccination?   No   Do you have a long-term health problem with heart, lung, kidney, or metabolic disease (e.g., diabetes), asthma, a blood disorder, no spleen, complement component deficiency, a cochlear implant, or a spinal fluid leak?  Are you on long-term aspirin therapy?   No   Do you have cancer, leukemia, HIV/AIDS, or any other immune system problem?   No   Do you have a parent, brother, or sister with an immune system problem?   No   In the past 3 months, have you taken medications that affect  your immune system, such as prednisone, other steroids, or anticancer drugs; drugs for the treatment of rheumatoid arthritis, Crohn s disease, or psoriasis; or have you had radiation treatments?   No   Have you had a seizure, or a brain or other nervous system problem?   No   During the past year, have you received a transfusion of blood or blood    products, or been given immune (gamma) globulin or antiviral drug?   No   For women: Are you pregnant or is there a chance you could become       pregnant during the next month?   No   Have you received any vaccinations in the past 4 weeks?   No     Immunization questionnaire answers were all negative.      Patient instructed to remain in clinic for 15 minutes afterwards, and to report any adverse  reactions.     Screening performed by TONIE Vieyra MA on 1/6/2025 at 8:56 AM.

## 2025-01-06 NOTE — PATIENT INSTRUCTIONS
How to Take Your Medication Before Surgery  Preoperative Medication Instructions   Antiplatelet or Anticoagulation Medication Instructions   - Patient is on no antiplatelet or anticoagulation medications.    Additional Medication Instructions  Take all scheduled medications on the day of surgery       Patient Education   Preparing for Your Surgery  For Adults  Getting started  In most cases, a nurse will call to review your health history and instructions. They will give you an arrival time based on your scheduled surgery time. Please be ready to share:  Your doctor's clinic name and phone number  Your medical, surgical, and anesthesia history  A list of allergies and sensitivities  A list of medicines, including herbal treatments and over-the-counter drugs  Whether the patient has a legal guardian (ask how to send us the papers in advance)  Note: You may not receive a call if you were seen at our PAC (Preoperative Assessment Center).  Please tell us if you're pregnant--or if there's any chance you might be pregnant. Some surgeries may injure a fetus (unborn baby), so they require a pregnancy test. Surgeries that are safe for a fetus don't always need a test, and you can choose whether to have one.   Preparing for surgery  Within 10 to 30 days of surgery: Have a pre-op exam (sometimes called an H&P, or History and Physical). This can be done at a clinic or pre-operative center.  If you're having a , you may not need this exam. Talk to your care team.  At your pre-op exam, talk to your care team about all medicines you take. (This includes CBD oil and any drugs, such as THC, marijuana, and other forms of cannabis.) If you need to stop any medicine before surgery, ask when to start taking it again.  This is for your safety. Many medicines and drugs can make you bleed too much during surgery. Some change how well surgery (anesthesia) drugs work.  Call your insurance company to let them know you're having  surgery. (If you don't have insurance, call [phone].)  Call your clinic if there's any change in your health. This includes a scrape or scratch near the surgery site, or any signs of a cold (sore throat, runny nose, cough, rash, fever).  Eating and drinking guidelines  For your safety: Unless your surgeon tells you otherwise, follow the guidelines below.  Eat and drink as normal until 8 hours before you arrive for surgery. After that, no food or milk. You can spit out gum when you arrive.  Drink clear liquids until 2 hours before you arrive. These are liquids you can see through, like water, Gatorade, and Propel Water. They also include plain black coffee and tea (no cream or milk).  No alcohol for 24 hours before you arrive. The night before surgery, stop any drinks that contain THC.  If your care team tells you to take medicine on the morning of surgery, it's okay to take it with a sip of water. No other medicines or drugs are allowed (including CBD oil)--follow your care team's instructions.  If you have questions the day of surgery, call your hospital or surgery center.   Preventing infection  Shower or bathe the night before and the morning of surgery. Follow the instructions your clinic gave you. (If no instructions, use regular soap.)  Don't shave or clip hair near your surgery site. We'll remove the hair if needed.  Don't smoke or vape the morning of surgery. No chewing tobacco for 6 hours before you arrive. A nicotine patch is okay. You may spit out nicotine gum when you arrive.  For some surgeries, the surgeon will tell you to fully quit smoking and nicotine.  We will make every effort to keep you safe from infection. We will:  Clean our hands often with soap and water (or an alcohol-based hand rub).  Clean the skin at your surgery site with a special soap that kills germs.  Give you a special gown to keep you warm. (Cold raises the risk of infection.)  Wear hair covers, masks, gowns, and gloves  during surgery.  Give antibiotic medicine, if prescribed. Not all surgeries need this medicine.  What to bring on the day of surgery  Photo ID and insurance card  Copy of your health care directive, if you have one  Glasses and hearing aids (bring cases)  You can't wear contacts during surgery  Inhaler and eye drops, if you use them (tell us about these when you arrive)  CPAP machine or breathing device, if you use them  A few personal items, if spending the night  If you have . . .  A pacemaker, ICD (cardiac defibrillator), or other implant: Bring the ID card.  An implanted stimulator: Bring the remote control.  A legal guardian: Bring a copy of the certified (court-stamped) guardianship papers.  Please remove any jewelry, including body piercings. Leave jewelry and other valuables at home.  If you're going home the day of surgery  You must have a responsible adult drive you home. They should stay with you overnight as well.  If you don't have someone to stay with you, and you aren't safe to go home alone, we may keep you overnight. Insurance often won't pay for this.  After surgery  If it's hard to control your pain or you need more pain medicine, please call your surgeon's office.  Questions?   If you have any questions for your care team, list them here:   ____________________________________________________________________________________________________________________________________________________________________________________________________________________________________________________________  For informational purposes only. Not to replace the advice of your health care provider. Copyright   2003, 2019 [facility]. All rights reserved. Clinically reviewed by Srikanth Mayorga MD. [email or web address] [id number] - REV 08/24.

## 2025-01-07 LAB
HPV HR 12 DNA CVX QL NAA+PROBE: NEGATIVE
HPV16 DNA CVX QL NAA+PROBE: NEGATIVE
HPV18 DNA CVX QL NAA+PROBE: NEGATIVE
HUMAN PAPILLOMA VIRUS FINAL DIAGNOSIS: NORMAL

## 2025-01-08 LAB
BKR AP ASSOCIATED HPV REPORT: NORMAL
BKR LAB AP GYN ADEQUACY: NORMAL
BKR LAB AP GYN INTERPRETATION: NORMAL
BKR LAB AP PREVIOUS ABNORMAL: NORMAL
PATH REPORT.COMMENTS IMP SPEC: NORMAL
PATH REPORT.COMMENTS IMP SPEC: NORMAL
PATH REPORT.RELEVANT HX SPEC: NORMAL

## 2025-01-10 PROBLEM — K11.8 PAROTID MASS: Status: ACTIVE | Noted: 2024-11-23

## 2025-01-13 ENCOUNTER — ANESTHESIA EVENT (OUTPATIENT)
Dept: SURGERY | Facility: AMBULATORY SURGERY CENTER | Age: 45
End: 2025-01-13
Payer: COMMERCIAL

## 2025-01-13 ENCOUNTER — OFFICE VISIT (OUTPATIENT)
Dept: NEUROLOGY | Facility: CLINIC | Age: 45
End: 2025-01-13
Attending: NURSE PRACTITIONER
Payer: COMMERCIAL

## 2025-01-13 VITALS
SYSTOLIC BLOOD PRESSURE: 113 MMHG | WEIGHT: 208 LBS | BODY MASS INDEX: 41.93 KG/M2 | HEART RATE: 80 BPM | HEIGHT: 59 IN | DIASTOLIC BLOOD PRESSURE: 82 MMHG

## 2025-01-13 DIAGNOSIS — G40.109 SIMPLE PARTIAL SEIZURE (H): ICD-10-CM

## 2025-01-13 DIAGNOSIS — R25.3 MUSCLE TWITCH: Primary | ICD-10-CM

## 2025-01-13 PROCEDURE — 99205 OFFICE O/P NEW HI 60 MIN: CPT | Performed by: PSYCHIATRY & NEUROLOGY

## 2025-01-13 PROCEDURE — G2211 COMPLEX E/M VISIT ADD ON: HCPCS | Performed by: PSYCHIATRY & NEUROLOGY

## 2025-01-13 ASSESSMENT — LIFESTYLE VARIABLES: TOBACCO_USE: 1

## 2025-01-13 NOTE — ANESTHESIA PREPROCEDURE EVALUATION
Anesthesia Pre-Procedure Evaluation    Patient: Anish Hauser   MRN: [id number] : 1980        Procedure : Procedure(s):  EXCISION, PAROTID GLAND          Past Medical History:   Diagnosis Date    Head injury, closed, sequela 2018    Heartburn     History of angina     History of blood transfusion     Hypertension     Iron deficiency anemia due to chronic blood loss 2022    Motion sickness     Seasonal allergies     Thyroid disease       Past Surgical History:   Procedure Laterality Date    BREAST CYST INCISION AND DRAINAGE Left     sebacous cyst, Dr. Sidhu    COLONOSCOPY N/A 2023    Procedure: COLONOSCOPY;  Surgeon: Montana Tadeo MD;  Location:  GI    IR FINE NEEDLE ASPIRATION W ULTRASOUND  2024    LAPAROSCOPIC CHOLECYSTECTOMY N/A 2022    Procedure: CHOLECYSTECTOMY, LAPAROSCOPIC;  Surgeon: Julissa Mcmullen DO;  Location: [facility]      Allergies   Allergen Reactions    Dust Mite Extract Hives    Grass Extracts [Gramineae Pollens] Hives    Shellfish Allergy Hives      Social History     Tobacco Use    Smoking status: Former     Current packs/day: 0.00     Types: Cigarettes     Quit date:      Years since quittin.0     Passive exposure: Never    Smokeless tobacco: Former     Quit date: 2014    Tobacco comments:     No exposure to second hand smoking.   Substance Use Topics    Alcohol use: No      Wt Readings from Last 1 Encounters:   25 94.3 kg (208 lb)        Anesthesia Evaluation   Pt has had prior anesthetic.     No history of anesthetic complications       ROS/MED HX  ENT/Pulmonary:  - neg pulmonary ROS   (+)                tobacco use, Past use,                       Neurologic:  - neg neurologic ROS     Cardiovascular:     (+)  hypertension- -   -  - -                                      METS/Exercise Tolerance:     Hematologic:  - neg hematologic  ROS     Musculoskeletal:  - neg musculoskeletal ROS     GI/Hepatic:  - neg GI/hepatic ROS      Renal/Genitourinary:  - neg Renal ROS     Endo: Comment: Pre-DM - neg endo ROS   (+)          thyroid problem, hypothyroidism,    Obesity (bmi 37),       Psychiatric/Substance Use:  - neg psychiatric ROS   (+) psychiatric history anxiety and depression       Infectious Disease:  - neg infectious disease ROS     Malignancy:  - neg malignancy ROS     Other:  - neg other ROS          Physical Exam    Airway        Mallampati: III       Respiratory Devices and Support         Dental       (+) Minor Abnormalities - some fillings, tiny chips      Cardiovascular   cardiovascular exam normal          Pulmonary   pulmonary exam normal                OUTSIDE LABS:  CBC:   Lab Results   Component Value Date    WBC 6.3 01/06/2025    WBC 5.9 10/28/2024    HGB 12.4 01/06/2025    HGB 12.3 10/28/2024    HCT 39.3 01/06/2025    HCT 38.3 10/28/2024     01/06/2025     10/28/2024     BMP:   Lab Results   Component Value Date     01/06/2025     10/28/2024    POTASSIUM 3.9 01/06/2025    POTASSIUM 4.0 10/28/2024    CHLORIDE 102 01/06/2025    CHLORIDE 105 10/28/2024    CO2 24 01/06/2025    CO2 23 10/28/2024    BUN 15.8 01/06/2025    BUN 8.9 10/28/2024    CR 0.93 01/06/2025    CR 0.84 10/28/2024    GLC 89 01/06/2025    GLC 89 10/28/2024     COAGS:   Lab Results   Component Value Date    INR 0.94 11/21/2024     POC:   Lab Results   Component Value Date    HCG Negative 01/06/2025     HEPATIC:   Lab Results   Component Value Date    ALBUMIN 4.1 01/06/2025    PROTTOTAL 7.3 01/06/2025    ALT 11 01/06/2025    AST 22 01/06/2025    ALKPHOS 72 01/06/2025    BILITOTAL 0.6 01/06/2025     OTHER:   Lab Results   Component Value Date    A1C 5.5 10/28/2024    RAFAEL 9.1 01/06/2025    MAG 2.1 07/29/2024    TSH 1.49 10/28/2024    T4 1.08 08/19/2024    T3 57 10/21/2020    SED 25 (H) 10/28/2024       Anesthesia Plan    ASA Status:  3    NPO Status:  NPO Appropriate    Anesthesia Type: General.     - Airway: ETT   Induction:  "Intravenous, Propofol.   Maintenance: TIVA.        Consents    Anesthesia Plan(s) and associated risks, benefits, and realistic alternatives discussed. Questions answered and patient/representative(s) expressed understanding.     - Discussed: Risks, Benefits and Alternatives for the PROCEDURE were discussed     - Discussed with:  Patient            Postoperative Care    Pain management: Oral pain medications, IV analgesics, Multi-modal analgesia.   PONV prophylaxis: Ondansetron (or other 5HT-3), Dexamethasone or Solumedrol, Background Propofol Infusion     Comments:               Saw Vigil MD    I have reviewed the pertinent notes and labs in the chart from the past 30 days and (re)examined the patient.  Any updates or changes from those notes are reflected in this note.               # Hypertension: Noted on problem list           # Severe Obesity: Estimated body mass index is 42.01 kg/m  as calculated from the following:    Height as of 1/13/25: 1.499 m (4' 11\").    Weight as of 1/13/25: 94.3 kg (208 lb).             "

## 2025-01-13 NOTE — NURSING NOTE
Chief Complaint   Patient presents with    muscle twitching     Patient states she is having muscle twitching on her head and above her ears. Her whole body has this twitching. New patient     Uzma Mcmahon on 1/13/2025 at 12:14 PM

## 2025-01-13 NOTE — LETTER
"2025      Anish Hauser  [address]  [place] [address]      Dear Colleague,    Thank you for referring your patient, Anish Hauser, to the [facility] [place]. Please see a copy of my visit note below.    NEUROLOGY CONSULTATION NOTE       [facility] [place]  [address] [place] [address]  Tel: [phone]  Fax: [phone]  [email or web address]     Anihs Hauser,  1980, MRN [id number]  PCP: Bibi Colunga  Date: 2025     ASSESSMENT & PLAN     Visit Diagnosis  Myoclonic jerking     Subjective muscle twitching  44-year-old female with history of morbid obesity, hypothyroidism, prediabetes, pleomorphic parotid adenoma who was referred for evaluation of muscle twitches that she notices \"inside her brain and right ear\".  She also reports twitches on the right side of the body but cannot see it.  She has pleomorphic parotid adenoma and is scheduled for resection tomorrow and her symptoms could be due to underlying anxiety but simple partial seizure is a possibility and I have recommended:    1.  MRI brain  2.  EEG  3.  If above 2 tests are normal no further workup will be needed from neurology standpoint  4.  Follow-up the day she is scheduled for EEG    Thank you again for this referral, please feel free to contact me if you have any questions.    Juan Carlos Castillo MD  [facility], [place]     REASON FOR CONSULTATION muscle twitching        HISTORY OF PRESENT ILLNESS     We have been requested by Sherri Lopes to evaluate Anish Hauser who is a 44 year old  female for myoclonic twitching    Patient is a 44-year-old female with history of morbid obesity, hypothyroidism, prediabetes, pleomorphic parotid adenoma who was referred for evaluation of muscle twitches.  Patient has somewhat peculiar description of her muscle twitches.  She reports that initially she noticed twitches around her right eye but subsequently started noticing muscle " spasm inside her brain at times around her right ear and at random on the right side of the body.  She cannot see these twitches and has difficulty explaining what she means by muscle twitch in her brain.  She denies losing consciousness, tonic-clonic activity or any bowel or bladder incontinence.  She had extensive workup that included comprehensive metabolic panel, vitamin D, ferritin, HIV, Lyme titer, SPEP, immunofixation, GM1 antibody, rheumatoid factor, antinuclear antibody, CK and ionized calcium that was normal.     PROBLEM LIST   Patient Active Problem List   Diagnosis     Morbid obesity (H)     Hypothyroidism due to Hashimoto's thyroiditis     Hidradenitis     Prediabetes     Iron deficiency anemia due to chronic blood loss     Family history of kidney disease     Primary hypertension     Vitamin D deficiency     Pleomorphic adenoma of parotid gland     Menorrhagia with regular cycle     Advanced directives, counseling/discussion     Parotid mass         PAST MEDICAL & SURGICAL HISTORY     Past Medical History:   Patient  has a past medical history of Head injury, closed, sequela (01/16/2018), Heartburn, History of angina, History of blood transfusion, Hypertension (2023), Iron deficiency anemia due to chronic blood loss (11/25/2022), Motion sickness, Seasonal allergies, and Thyroid disease.    Surgical History:  She  has a past surgical history that includes Breast Cyst Incision And Drainage (Left); Laparoscopic cholecystectomy (N/A, 1/25/2022); Colonoscopy (N/A, 2/24/2023); and IR Fine Needle Aspiration w Ultrasound (11/21/2024).     SOCIAL HISTORY     Reviewed, and she  reports that she quit smoking about 16 years ago. Her smoking use included cigarettes. She has never been exposed to tobacco smoke. She quit smokeless tobacco use about 10 years ago. She reports that she does not drink alcohol and does not use drugs.     FAMILY HISTORY     Reviewed, and family history includes Diabetes in her mother;  "Hepatitis in her maternal grandmother; Hyperlipidemia in her mother; Hyperparathyroidism in her daughter; Hypertension in her brother, maternal grandmother, and mother; IgA nephropathy in her brother and daughter; Liver Disease in her brother; No Known Problems in her father, sister, sister, sister, sister, and son; Uterine Cancer in her mother.     ALLERGIES     Allergies   Allergen Reactions     Dust Mite Extract Hives     Grass Extracts [Gramineae Pollens] Hives     Shellfish Allergy Hives         REVIEW OF SYSTEMS     A 12 point review of system was performed and was negative except as outlined in the history of present illness.     HOME MEDICATIONS     Current Outpatient Rx   Medication Sig Dispense Refill     ferrous gluconate (FERGON) 324 (38 Fe) MG tablet Take iron pill once daily with food for 7 days/month during her period 21 tablet 4     levothyroxine (SYNTHROID/LEVOTHROID) 75 MCG tablet Take 1 tablet (75 mcg) by mouth daily. 90 tablet 3     tranexamic acid (LYSTEDA) 650 MG tablet 2 tabs twice daily for up to 5 days/month during menstrual bleeding. 20 tablet 11         PHYSICAL EXAM     Vital signs  /82   Pulse 80   Ht 1.499 m (4' 11\")   Wt 94.3 kg (208 lb)   LMP 12/28/2024   BMI 42.01 kg/m      Weight:   208 lbs 0 oz    Patient is alert and oriented x4 in no acute distress. Vital signs were reviewed and are documented in electronic medical record. Neck was supple, no carotid bruits, thyromegaly, JVD, or lymphadenopathy was noted.   NEUROLOGY EXAM:    Patient s speech was normal with no aphasia or dysarthria. Mentation, and affect were also normal.     Funduscopic exam was normal, with normal cup to disc ratio. Cranial nerves II -XII were intact.     Patient had normal mass, tone and motor strength was 5/5 in all extremities without pronator drift.     Sensation was intact to light touch, pinprick, and vibratory sensation.     Reflexes were 1+ symmetrical with downgoing toes.     No dysmetria " noted on FNF or HKS. Romberg was negative.    Gait testing was normal. Able to walk on toes/heels. Tandem walk normal.     PERTINENT DIAGNOSTIC STUDIES     Following studies were reviewed:     CT SOFT TISSUE NECK 10/24/2024  1. Right parotid gland solid mass measuring 1.5 x 1.9 x 1.6 cm,  grossly stable compared to the ultrasound completed on 7/15/2024 given  differences in technique. Mass is located just deep to the traversing  right external jugular vein.  2. No additional masses or suspicious cervical lymphadenopathy.  3. 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     PERTINENT LABS  Following labs were reviewed:  Office Visit on 01/06/2025   Component Date Value Ref Range Status     Human Papilloma Virus 16 DNA 01/06/2025 Negative  Negative Final     Human Papilloma Virus 18 DNA 01/06/2025 Negative  Negative Final     Human Papilloma Virus Other 01/06/2025 Negative  Negative Final     FINAL DIAGNOSIS 01/06/2025    Final                    Value:This patient's sample is negative for high risk HPV DNA.          METHODOLOGY: The [employer] system uses automated extraction, simultaneous amplification of HPV (E6/E7 oncogenes) and beta-globin, followed by real time detection of fluorescent labeled HPV and beta globin using specific oligonucleotide probes. The test specifically identifies types HPV 16 DNA and HPV 18 DNA while concurrently detecting the rest of the high risk types (31, 33, 35, 39, 45, 51, 52, 56, 58, 59, 66 or 68).     COMMENTS: This test is not intended for use as a screening device for woman under age 30 with normal cervical cytology. Results should be correlated with cytologic and histologic findings. Close clinical follow up is recommended.    Please see the separate Gynecologic Cytology (Pap) report from the same collection date.       Ventricular Rate 01/06/2025 69  BPM Final     Atrial Rate 01/06/2025 69  BPM Final     TN Interval 01/06/2025 156   ms Final     QRS Duration 01/06/2025 84  ms Final     QT 01/06/2025 434  ms Final     QTc 01/06/2025 465  ms Final     P Axis 01/06/2025 23  degrees Final     R AXIS 01/06/2025 16  degrees Final     T Axis 01/06/2025 -11  degrees Final     Interpretation ECG 01/06/2025    Final                    Value:Sinus rhythm  Normal ECG  When compared with ECG of 24-Feb-2020 10:06,  No significant change was found  Confirmed by CONOR ARCOS, LES LOC: ([id number]) on 1/6/2025 10:54:06 AM       WBC Count 01/06/2025 6.3  4.0 - 11.0 10e3/uL Final     RBC Count 01/06/2025 4.42  3.80 - 5.20 10e6/uL Final     Hemoglobin 01/06/2025 12.4  11.7 - 15.7 g/dL Final     Hematocrit 01/06/2025 39.3  35.0 - 47.0 % Final     MCV 01/06/2025 89  78 - 100 fL Final     MCH 01/06/2025 28.1  26.5 - 33.0 pg Final     MCHC 01/06/2025 31.6  31.5 - 36.5 g/dL Final     RDW 01/06/2025 14.0  10.0 - 15.0 % Final     Platelet Count 01/06/2025 260  150 - 450 10e3/uL Final     Ferritin 01/06/2025 40  6 - 175 ng/mL Final     hCG Urine Qualitative 01/06/2025 Negative  Negative Final     Sodium 01/06/2025 137  135 - 145 mmol/L Final     Potassium 01/06/2025 3.9  3.4 - 5.3 mmol/L Final     Carbon Dioxide (CO2) 01/06/2025 24  22 - 29 mmol/L Final     Anion Gap 01/06/2025 11  7 - 15 mmol/L Final     Urea Nitrogen 01/06/2025 15.8  6.0 - 20.0 mg/dL Final     Creatinine 01/06/2025 0.93  0.51 - 0.95 mg/dL Final     GFR Estimate 01/06/2025 78  >60 mL/min/1.73m2 Final     Calcium 01/06/2025 9.1  8.8 - 10.4 mg/dL Final     Chloride 01/06/2025 102  98 - 107 mmol/L Final     Glucose 01/06/2025 89  70 - 99 mg/dL Final     Alkaline Phosphatase 01/06/2025 72  40 - 150 U/L Final     AST 01/06/2025 22  0 - 45 U/L Final     ALT 01/06/2025 11  0 - 50 U/L Final     Protein Total 01/06/2025 7.3  6.4 - 8.3 g/dL Final     Albumin 01/06/2025 4.1  3.5 - 5.2 g/dL Final     Bilirubin Total 01/06/2025 0.6  <=1.2 mg/dL Final     Vitamin D, Total (25-Hydroxy) 01/06/2025 29  20 - 50 ng/mL Final      Interpretation 01/06/2025 Negative for Intraepithelial Lesion or Malignancy (NILM)    Final     Comment 01/06/2025    Final                    Value:  Papanicolaou Test Limitations:  Cervical cytology is a screening test with limited sensitivity, and regular screening is critical for cancer prevention.  Pap tests are primarily effective for the diagnosis/prevention of squamous cell carcinoma, not adenocarcinoma or other cancers.         Specimen Adequacy 01/06/2025 Satisfactory for evaluation, endocervical/transformation zone component present   Final     Clinical Information 01/06/2025    Final                    Value:none       Previous Abnormal? 01/06/2025    Final                    Value:No       Performing Labs 01/06/2025    Final                    Value:The technical component of this testing was completed at [facility].    Stain controls for all stains resulted within this report have been reviewed and show appropriate reactivity.       Associated HPV Report 01/06/2025    Final                    Value:Please see the associated HPV High Risk Types DNA Cervical report for Specimen [id number] from the same collection date.     Admission on 11/21/2024, Discharged on 11/21/2024   Component Date Value Ref Range Status     INR 11/21/2024 0.94  0.85 - 1.15 Final     Final Diagnosis 11/21/2024    Final                    Value:Specimen A     Interpretation:      Nondiagnostic     Adequacy:     Unsatisfactory for evaluation, Scant cellularity           Comment 11/21/2024    Final                    Value:There is insufficient tissue present for evaluation. Clinical correlation recommended with consideration of additional tissue sampling if clinically warranted.        Clinical Information 11/21/2024    Final                    Value:Right parotid mass.        Rapid Onsite Evaluation 11/21/2024    Final                    Value:FNA Performance:    Fine needle aspiration was not performed by [facility] staff.    Aspirate immediate study/adequacy:  I, CARLOS GONZALEZ MD, attest that I immediately examined smears while the procedure was underway and determined or confirmed the adequacy of the specimens via telepathology.    It is of note that the final assessment and report may be performed and signed by a different pathologist.    Onsite adequacy/interpretation:  A: Inadequate\       Gross Description 11/21/2024    Final                    Value:A. Parotid Gland, Right, Fine Needle Aspirate:  Received are 3 fixed slides, processed for Pap stain, 3 air dried slides, processed for Diff Quik stain, and material in formalin, processed for one hematoxylin stained cell block.       Microscopic Description 11/21/2024    Final                    Value:A microscopic examination was performed.     Case was reviewed by the following:  Resident Pathologist: Lenin Bryant MD  Pathology Fellow: Denice Kay MD  Pathology Fellow: Alicia Rivera DO  A resident or fellow in a training program was involved in the initial review, preparation, and/or interpretation of this case.  I, as the senior physician, attest that I have personally reviewed all specimens and or slides, including the listed special stains, and used them with my medical judgement to determine the final diagnosis.               Performing Labs 11/21/2024    Final                    Value:The technical component of this testing was completed at [facility].     Stain controls for all stains resulted within this report have been reviewed and show appropriate reactivity.      Lab on 10/28/2024   Component Date Value Ref Range Status     WBC Count 10/28/2024 5.9  4.0 - 11.0 10e3/uL Final     RBC Count 10/28/2024 4.27  3.80 - 5.20 10e6/uL Final     Hemoglobin 10/28/2024 12.3  11.7 - 15.7 g/dL Final      Hematocrit 10/28/2024 38.3  35.0 - 47.0 % Final     MCV 10/28/2024 90  78 - 100 fL Final     MCH 10/28/2024 28.8  26.5 - 33.0 pg Final     MCHC 10/28/2024 32.1  31.5 - 36.5 g/dL Final     RDW 10/28/2024 14.1  10.0 - 15.0 % Final     Platelet Count 10/28/2024 227  150 - 450 10e3/uL Final     Calcium Ionized Whole Blood 10/28/2024 4.5  4.4 - 5.2 mg/dL Final     Sodium 10/28/2024 138  135 - 145 mmol/L Final     Potassium 10/28/2024 4.0  3.4 - 5.3 mmol/L Final     Carbon Dioxide (CO2) 10/28/2024 23  22 - 29 mmol/L Final     Anion Gap 10/28/2024 10  7 - 15 mmol/L Final     Urea Nitrogen 10/28/2024 8.9  6.0 - 20.0 mg/dL Final     Creatinine 10/28/2024 0.84  0.51 - 0.95 mg/dL Final     GFR Estimate 10/28/2024 88  >60 mL/min/1.73m2 Final     Calcium 10/28/2024 8.6 (L)  8.8 - 10.4 mg/dL Final     Chloride 10/28/2024 105  98 - 107 mmol/L Final     Glucose 10/28/2024 89  70 - 99 mg/dL Final     Alkaline Phosphatase 10/28/2024 68  40 - 150 U/L Final     AST 10/28/2024 19  0 - 45 U/L Final     ALT 10/28/2024 13  0 - 50 U/L Final     Protein Total 10/28/2024 7.4  6.4 - 8.3 g/dL Final     Albumin 10/28/2024 3.9  3.5 - 5.2 g/dL Final     Bilirubin Total 10/28/2024 0.7  <=1.2 mg/dL Final     TSH 10/28/2024 1.49  0.30 - 4.20 uIU/mL Final     CK 10/28/2024 126  26 - 192 U/L Final     Erythrocyte Sedimentation Rate 10/28/2024 25 (H)  0 - 20 mm/hr Final     CRP Inflammation 10/28/2024 12.60 (H)  <5.00 mg/L Final     MALACHI interpretation 10/28/2024 Negative  Negative Final     Rheumatoid Factor 10/28/2024 <10  <14 IU/mL Final     GM1 Antibody IgG 10/28/2024 28  0 - 50 IV Final     GM1 Antibody IgM 10/28/2024 4  0 - 50 IV Final     Immunofixation ELP 10/28/2024 No monoclonal protein seen on immunofixation. Pathologic significance requires clinical correlation. Ruddy Nur MD   Final     Lyme Disease Antibodies Total 10/28/2024 0.30  <0.90 Final     HIV Antigen Antibody Combo 10/28/2024 Nonreactive  Nonreactive Final     Estimated  Average Glucose 10/28/2024 111  <117 mg/dL Final     Hemoglobin A1C 10/28/2024 5.5  0.0 - 5.6 % Final     Total Protein Serum for ELP 10/28/2024 7.1  6.4 - 8.3 g/dL Final     Albumin 10/28/2024 3.9  3.7 - 5.1 g/dL Final     Alpha 1 10/28/2024 0.3  0.2 - 0.4 g/dL Final     Alpha 2 10/28/2024 0.7  0.5 - 0.9 g/dL Final     Beta Globulin 10/28/2024 1.0  0.6 - 1.0 g/dL Final     Gamma Globulin 10/28/2024 1.2  0.7 - 1.6 g/dL Final     Monoclonal Peak 10/28/2024 0.0  <=0.0 g/dL Final     ELP Interpretation 10/28/2024 Essentially normal electrophoretic pattern. No obvious monoclonal proteins seen. Pathologic significance requires clinical correlation. Ruddy Nur MD   Final        Total time spent for face to face visit, reviewing labs/imaging studies, counseling and coordination of care was: 1 Hour spent on the date of the encounter doing chart review, review of outside records, review of test results, interpretation of tests, patient visit, and documentation     This note was dictated using voice recognition software.  Any grammatical or context distortions are unintentional and inherent to the software.    Orders Placed This Encounter   Procedures     MR Brain w/o & w Contrast     EEG      New Prescriptions    No medications on file      Modified Medications    No medications on file                Again, thank you for allowing me to participate in the care of your patient.        Sincerely,        Juan Carlos Castillo MD    Electronically signed

## 2025-01-14 ENCOUNTER — ANESTHESIA (OUTPATIENT)
Dept: SURGERY | Facility: AMBULATORY SURGERY CENTER | Age: 45
End: 2025-01-14
Payer: COMMERCIAL

## 2025-01-14 VITALS
BODY MASS INDEX: 41.93 KG/M2 | HEART RATE: 98 BPM | OXYGEN SATURATION: 92 % | HEIGHT: 59 IN | TEMPERATURE: 97.9 F | WEIGHT: 208 LBS | RESPIRATION RATE: 12 BRPM | DIASTOLIC BLOOD PRESSURE: 85 MMHG | SYSTOLIC BLOOD PRESSURE: 110 MMHG

## 2025-01-14 PROCEDURE — 88307 TISSUE EXAM BY PATHOLOGIST: CPT | Mod: 26 | Performed by: PATHOLOGY

## 2025-01-14 PROCEDURE — 88331 PATH CONSLTJ SURG 1 BLK 1SPC: CPT | Mod: TC | Performed by: OTOLARYNGOLOGY

## 2025-01-14 PROCEDURE — 81025 URINE PREGNANCY TEST: CPT | Performed by: PATHOLOGY

## 2025-01-14 PROCEDURE — 88331 PATH CONSLTJ SURG 1 BLK 1SPC: CPT | Mod: 26 | Performed by: PATHOLOGY

## 2025-01-14 RX ORDER — DEXAMETHASONE SODIUM PHOSPHATE 10 MG/ML
4 INJECTION, SOLUTION INTRAMUSCULAR; INTRAVENOUS
Status: DISCONTINUED | OUTPATIENT
Start: 2025-01-14 | End: 2025-01-15 | Stop reason: HOSPADM

## 2025-01-14 RX ORDER — PROPOFOL 10 MG/ML
INJECTION, EMULSION INTRAVENOUS PRN
Status: DISCONTINUED | OUTPATIENT
Start: 2025-01-14 | End: 2025-01-14

## 2025-01-14 RX ORDER — LIDOCAINE HYDROCHLORIDE 20 MG/ML
INJECTION, SOLUTION INFILTRATION; PERINEURAL PRN
Status: DISCONTINUED | OUTPATIENT
Start: 2025-01-14 | End: 2025-01-14

## 2025-01-14 RX ORDER — OXYCODONE HYDROCHLORIDE 5 MG/1
10 TABLET ORAL
Status: DISCONTINUED | OUTPATIENT
Start: 2025-01-14 | End: 2025-01-15 | Stop reason: HOSPADM

## 2025-01-14 RX ORDER — DEXAMETHASONE SODIUM PHOSPHATE 10 MG/ML
10 INJECTION, SOLUTION INTRAMUSCULAR; INTRAVENOUS ONCE
Status: COMPLETED | OUTPATIENT
Start: 2025-01-14 | End: 2025-01-14

## 2025-01-14 RX ORDER — KETOROLAC TROMETHAMINE 30 MG/ML
15 INJECTION, SOLUTION INTRAMUSCULAR; INTRAVENOUS
Status: DISCONTINUED | OUTPATIENT
Start: 2025-01-14 | End: 2025-01-15 | Stop reason: HOSPADM

## 2025-01-14 RX ORDER — ACETAMINOPHEN 325 MG/1
975 TABLET ORAL ONCE
Status: COMPLETED | OUTPATIENT
Start: 2025-01-14 | End: 2025-01-14

## 2025-01-14 RX ORDER — NALOXONE HYDROCHLORIDE 0.4 MG/ML
0.1 INJECTION, SOLUTION INTRAMUSCULAR; INTRAVENOUS; SUBCUTANEOUS
Status: DISCONTINUED | OUTPATIENT
Start: 2025-01-14 | End: 2025-01-15 | Stop reason: HOSPADM

## 2025-01-14 RX ORDER — PROPOFOL 10 MG/ML
INJECTION, EMULSION INTRAVENOUS CONTINUOUS PRN
Status: DISCONTINUED | OUTPATIENT
Start: 2025-01-14 | End: 2025-01-14

## 2025-01-14 RX ORDER — SODIUM CHLORIDE, SODIUM LACTATE, POTASSIUM CHLORIDE, CALCIUM CHLORIDE 600; 310; 30; 20 MG/100ML; MG/100ML; MG/100ML; MG/100ML
INJECTION, SOLUTION INTRAVENOUS CONTINUOUS PRN
Status: DISCONTINUED | OUTPATIENT
Start: 2025-01-14 | End: 2025-01-14

## 2025-01-14 RX ORDER — FENTANYL CITRATE 50 UG/ML
INJECTION, SOLUTION INTRAMUSCULAR; INTRAVENOUS PRN
Status: DISCONTINUED | OUTPATIENT
Start: 2025-01-14 | End: 2025-01-14

## 2025-01-14 RX ORDER — HYDROMORPHONE HYDROCHLORIDE 1 MG/ML
0.2 INJECTION, SOLUTION INTRAMUSCULAR; INTRAVENOUS; SUBCUTANEOUS EVERY 5 MIN PRN
Status: DISCONTINUED | OUTPATIENT
Start: 2025-01-14 | End: 2025-01-15 | Stop reason: HOSPADM

## 2025-01-14 RX ORDER — FENTANYL CITRATE 50 UG/ML
50 INJECTION, SOLUTION INTRAMUSCULAR; INTRAVENOUS EVERY 5 MIN PRN
Status: DISCONTINUED | OUTPATIENT
Start: 2025-01-14 | End: 2025-01-15 | Stop reason: HOSPADM

## 2025-01-14 RX ORDER — DIMENHYDRINATE 50 MG/ML
25 INJECTION, SOLUTION INTRAMUSCULAR; INTRAVENOUS
Status: DISCONTINUED | OUTPATIENT
Start: 2025-01-14 | End: 2025-01-15 | Stop reason: HOSPADM

## 2025-01-14 RX ORDER — ONDANSETRON 2 MG/ML
4 INJECTION INTRAMUSCULAR; INTRAVENOUS EVERY 30 MIN PRN
Status: DISCONTINUED | OUTPATIENT
Start: 2025-01-14 | End: 2025-01-15 | Stop reason: HOSPADM

## 2025-01-14 RX ORDER — GINSENG 100 MG
CAPSULE ORAL PRN
Status: DISCONTINUED | OUTPATIENT
Start: 2025-01-14 | End: 2025-01-14 | Stop reason: HOSPADM

## 2025-01-14 RX ORDER — LIDOCAINE 40 MG/G
CREAM TOPICAL
Status: DISCONTINUED | OUTPATIENT
Start: 2025-01-14 | End: 2025-01-15 | Stop reason: HOSPADM

## 2025-01-14 RX ORDER — ONDANSETRON 2 MG/ML
INJECTION INTRAMUSCULAR; INTRAVENOUS PRN
Status: DISCONTINUED | OUTPATIENT
Start: 2025-01-14 | End: 2025-01-14

## 2025-01-14 RX ORDER — AMPICILLIN AND SULBACTAM 2; 1 G/1; G/1
3 INJECTION, POWDER, FOR SOLUTION INTRAMUSCULAR; INTRAVENOUS
Status: COMPLETED | OUTPATIENT
Start: 2025-01-14 | End: 2025-01-14

## 2025-01-14 RX ORDER — FENTANYL CITRATE 50 UG/ML
25 INJECTION, SOLUTION INTRAMUSCULAR; INTRAVENOUS EVERY 5 MIN PRN
Status: DISCONTINUED | OUTPATIENT
Start: 2025-01-14 | End: 2025-01-15 | Stop reason: HOSPADM

## 2025-01-14 RX ORDER — OXYCODONE HYDROCHLORIDE 5 MG/1
5 TABLET ORAL EVERY 6 HOURS PRN
Qty: 12 TABLET | Refills: 0 | Status: SHIPPED | OUTPATIENT
Start: 2025-01-14

## 2025-01-14 RX ORDER — LISINOPRIL 5 MG/1
5 TABLET ORAL DAILY
COMMUNITY

## 2025-01-14 RX ORDER — ONDANSETRON 4 MG/1
4 TABLET, ORALLY DISINTEGRATING ORAL EVERY 30 MIN PRN
Status: DISCONTINUED | OUTPATIENT
Start: 2025-01-14 | End: 2025-01-15 | Stop reason: HOSPADM

## 2025-01-14 RX ORDER — OXYCODONE HYDROCHLORIDE 5 MG/1
5 TABLET ORAL
Status: DISCONTINUED | OUTPATIENT
Start: 2025-01-14 | End: 2025-01-15 | Stop reason: HOSPADM

## 2025-01-14 RX ORDER — HYDROMORPHONE HYDROCHLORIDE 1 MG/ML
0.4 INJECTION, SOLUTION INTRAMUSCULAR; INTRAVENOUS; SUBCUTANEOUS EVERY 5 MIN PRN
Status: DISCONTINUED | OUTPATIENT
Start: 2025-01-14 | End: 2025-01-15 | Stop reason: HOSPADM

## 2025-01-14 RX ORDER — GLYCOPYRROLATE 0.2 MG/ML
INJECTION, SOLUTION INTRAMUSCULAR; INTRAVENOUS PRN
Status: DISCONTINUED | OUTPATIENT
Start: 2025-01-14 | End: 2025-01-14

## 2025-01-14 RX ORDER — AMPICILLIN AND SULBACTAM 1; .5 G/1; G/1
1.5 INJECTION, POWDER, FOR SOLUTION INTRAMUSCULAR; INTRAVENOUS SEE ADMIN INSTRUCTIONS
Status: DISCONTINUED | OUTPATIENT
Start: 2025-01-14 | End: 2025-01-15 | Stop reason: HOSPADM

## 2025-01-14 RX ADMIN — Medication 50 MCG: at 12:50

## 2025-01-14 RX ADMIN — GLYCOPYRROLATE 0.2 MG: 0.2 INJECTION, SOLUTION INTRAMUSCULAR; INTRAVENOUS at 09:46

## 2025-01-14 RX ADMIN — Medication 8 MCG: at 12:20

## 2025-01-14 RX ADMIN — Medication 0.5 MG: at 12:13

## 2025-01-14 RX ADMIN — SODIUM CHLORIDE, SODIUM LACTATE, POTASSIUM CHLORIDE, CALCIUM CHLORIDE: 600; 310; 30; 20 INJECTION, SOLUTION INTRAVENOUS at 09:30

## 2025-01-14 RX ADMIN — Medication 8 MCG: at 10:31

## 2025-01-14 RX ADMIN — PROPOFOL 200 MCG/KG/MIN: 10 INJECTION, EMULSION INTRAVENOUS at 09:43

## 2025-01-14 RX ADMIN — PROPOFOL 175 MCG/KG/MIN: 10 INJECTION, EMULSION INTRAVENOUS at 10:46

## 2025-01-14 RX ADMIN — ACETAMINOPHEN 975 MG: 325 TABLET ORAL at 08:16

## 2025-01-14 RX ADMIN — Medication 100 MCG: at 12:37

## 2025-01-14 RX ADMIN — LIDOCAINE HYDROCHLORIDE 100 MG: 20 INJECTION, SOLUTION INFILTRATION; PERINEURAL at 09:36

## 2025-01-14 RX ADMIN — PROPOFOL 125 MCG/KG/MIN: 10 INJECTION, EMULSION INTRAVENOUS at 12:26

## 2025-01-14 RX ADMIN — DEXAMETHASONE SODIUM PHOSPHATE 10 MG: 10 INJECTION, SOLUTION INTRAMUSCULAR; INTRAVENOUS at 09:46

## 2025-01-14 RX ADMIN — ONDANSETRON 4 MG: 2 INJECTION INTRAMUSCULAR; INTRAVENOUS at 09:46

## 2025-01-14 RX ADMIN — AMPICILLIN AND SULBACTAM 1.5 G: 2; 1 INJECTION, POWDER, FOR SOLUTION INTRAMUSCULAR; INTRAVENOUS at 12:03

## 2025-01-14 RX ADMIN — AMPICILLIN AND SULBACTAM 3 G: 2; 1 INJECTION, POWDER, FOR SOLUTION INTRAMUSCULAR; INTRAVENOUS at 09:58

## 2025-01-14 RX ADMIN — PROPOFOL 175 MCG/KG/MIN: 10 INJECTION, EMULSION INTRAVENOUS at 11:17

## 2025-01-14 RX ADMIN — PROPOFOL 200 MG: 10 INJECTION, EMULSION INTRAVENOUS at 09:36

## 2025-01-14 RX ADMIN — PROPOFOL 150 MCG/KG/MIN: 10 INJECTION, EMULSION INTRAVENOUS at 12:04

## 2025-01-14 RX ADMIN — FENTANYL CITRATE 100 MCG: 50 INJECTION, SOLUTION INTRAMUSCULAR; INTRAVENOUS at 09:36

## 2025-01-14 RX ADMIN — Medication 50 MCG: at 12:14

## 2025-01-14 RX ADMIN — Medication 100 MCG: at 10:04

## 2025-01-14 NOTE — BRIEF OP NOTE
[facility]    Brief Operative Note    Pre-operative diagnosis: Parotid mass [K11.8]  Post-operative diagnosis Same as pre-operative diagnosis    Procedure: EXCISION, PAROTID GLAND, Right - Neck    Surgeon: Surgeons and Role:     * Iris Max MD - Primary     * Nikky Luna MD - Resident - Assisting     * Sobeida Salomon MD - Resident - Assisting  Anesthesia: General   Estimated Blood Loss: Less than 50 ml    Drains: Christiano-Maxwell  Specimens:   ID Type Source Tests Collected by Time Destination   1 : Right Deep Lobe Parotid Mass Tissue Parotid Gland, Right SURGICAL PATHOLOGY EXAM Iris Max MD 1/14/2025 12:02 PM    2 : Superficial Parotid Tissue Parotid Gland, Right SURGICAL PATHOLOGY EXAM Iris Max MD 1/14/2025 12:03 PM      Findings:   Frozen suspicious for basal cell adenoma. NOY x1. Facial nerve preserved    Complications: None.  Implants: * No implants in log *

## 2025-01-14 NOTE — OP NOTE
Date of Procedure: 1/14/2025    Attending Physician: Iris Max MD    Resident Physicians:   MD Sobeida Shafer MD    Procedure Performed:  Deep lobe parotidectomy    Preoperative Diagnosis: parotid mass    Postoperative Diagnosis: same    Anesthesia: General    Blood loss: 25 cc    Specimens:   ID Type Source Tests Collected by Time Destination   1 : Right Deep Lobe Parotid Mass Tissue Parotid Gland, Right SURGICAL PATHOLOGY EXAM Iris Max MD 1/14/2025 12:02 PM     2 : Superficial Parotid Tissue Parotid Gland, Right SURGICAL PATHOLOGY EXAM Iris Max MD 1/14/2025 12:03 PM          Implants:  NOY drain x 1    Complications: None    Findings:   About 1.5 cm mass present in the deep lobe of the parotid, inferior/deep to the inferior division of the facial nerve  Greater auricular nerve preserved  Facial nerve preserved and stimulated at end of case    Indications:  Anish Hauser is a 44-year-old woman who was referred in October 2024 for evaluation of a right parotid mass.  She had seen her primary care physician in April 2024 with about a 6 to 7-month history of a mass below the right ear.  She had an ultrasound performed on 7/15/2024 which demonstrated 1.8 x 1.7 x 1.5 cm mass in the right parotid.  She had an IR guided biopsy on 8/1/2024 which demonstrated pleomorphic adenoma (outside read) with some University review.  She was seen as a new patient in October 2024 and was recommended for CT scan and attempt at repeat biopsy.  CT on 10/24/2024 demonstrated a 1.5 x 1.9 x 1.6 cm well-circumscribed mass in the right parotid gland in the deep lobe.  Repeat IR guided biopsy on 11/21/2024 was nondiagnostic.  She elected to proceed with surgical resection with parotidectomy.    Description of Procedure:  After informed consent was obtained, the patient was brought back to the operating room and placed in a supine position.  General anesthesia was induced and the patient was orotracheally  intubated.  The bed was turned 90 degrees away from anesthesia.  The facial nerve electrodes were placed in 4-channel fashion and tested.  A shoulder roll was placed.  The patient was prepped and draped in sterile fashion.  A timeout was performed.  The planned modified Neftali incision was marked in the preauricular region and extending down into the neck.  This was injected with 1-100,000 epinephrine.  A 15 blade was used to make an incision through the skin.  This was extended down to the SCM inferiorly and superiorly up to the parotid fascia.  A skin flap was raised using the monopolar cautery working out towards the anterior border of the parotid gland.  The greater auricular nerve was identified and traced out towards the lobule.  This was freed circumferentially and preserved.  The external jugular vein was clipped and divided given the anticipated deep lobe dissection requiring sacrifice of the vessel.  The monopolar cautery was used to release the tail of the parotid off the SCM.  The anterior border of the SCM was identified.  A pretragal plane of dissection was defined and this was connected down into the neck.  The digastric muscle was identified and traced posteriorly.  Blunt dissection was performed to identify the main trunk of the facial nerve.  This was verified using the nerve stimulator.  The inferior division of the facial nerve was dissected out working from proximal to distal.  The overlying parotid gland was divided with bipolar cautery after testing it with the nerve stimulator.  This was dissected out along the marginal mandibular and cervical nerve branches working from proximal to distal.  Once the most inferior branch was dissected the parotid gland was released along the inferior aspect of the lowermost branch of the facial nerve.  The retromandibular vein was clipped and divided below the nerve.  Of note the parotid gland was tested with the nerve stimulator prior to dividing any of the  tissue.  The parotid continued to be released inferior to the lowermost branch of the facial nerve to allow access to the deep lobe of the parotid where the mass was located.  The mass was identified and released along its superior border using blunt dissection with a Rodney dissector and bipolar cautery.  Once the superior border was completely released the lateral and medial edges were released along with the deep aspect.  The plane of dissection was inferior to the terminal external.  The specimen was handed off to nursing for frozen section pathology.  A portion of the superficial gland superior to the mass was taken as a separate specimen and handed off to nursing for permanent pathology.  The facial nerve was tested with the nerve stimulator with movement on the monitor and on the face.  The wound was irrigated with saline.  Hemostasis was achieved with bipolar cautery.  Multiple sustained Valsalva maneuvers were performed to ensure hemostasis.  At this point the pathology returned as likely basal cell adenoma versus pleomorphic adenoma.  Surgicel was placed over the facial nerve.  A 7 flat NOY drain was placed and secured to the skin with a 3-0 nylon.  The skin incision was closed with a buried interrupted 3-0 Vicryl followed by running 5-0 Prolene.  Bacitracin was applied to the incision and a jaw bra was placed.  The patient was handed back to anesthesia for extubation.  She was transported to the recovery room in stable condition with no immediate complications.    I was present for and participated in the entire procedure.      Iris Max MD    Department of Otolaryngology

## 2025-01-14 NOTE — DISCHARGE INSTRUCTIONS
[facility]  Home Care Following Anesthesia  For 24 hours after surgery:  Get plenty of rest.  A responsible adult must stay with you for at least 24 hours after you leave the surgery center.  Do not drive or use heavy equipment.  If you have weakness or tingling, don't drive or use heavy equipment until this feeling goes away.   Do not drink alcohol.   Avoid strenuous or risky activities.  Ask for help when climbing stairs.  You may feel lightheaded.  IF so, sit for a few minutes before standing.  Have someone help you get up.   If you have nausea (feel sick to your stomach): Drink only clear liquids such as apple juice, ginger ale, broth or 7-Up.  Rest may also help.  Be sure to drink enough fluids.  Move to a regular diet as you feel able.   You may have a slight fever.  Call the doctor if your fever is over 100 F (37.7 C) (taken under the tongue) or lasts longer than 24 hours.  You may have a dry mouth, a sore throat, muscle aches or trouble sleeping. These should go away after 24 hours.  Do not make important or legal decisions.   It is recommended to avoid smoking.   If you use hormonal birth control (such as the pill, patch, ring or implants):  You will need a second form of birth control for 7 days (condoms, a diaphragm or contraceptive foam).  While in the surgery center, you received a medicine called Sugammadex.  Hormonal birth control (such as the pill, patch, ring or implants) will not work as well for a week after taking this medicine.         Tips for taking pain medications  To get the best pain relief possible, remember these points:  Take pain medications as directed, before pain becomes severe.  Pain medication can upset your stomach: taking it with food may help.  Constipation is a common side effect of pain medication. Drink plenty of  fluids.  Eat foods high in fiber. Take a stool softener if recommended by your doctor or pharmacist.  Do not drink alcohol,  drive or operate machinery while taking pain medications.  Ask about other ways to control pain, such as with heat, ice or relaxation.    Tylenol/Acetaminophen Consumption    If you feel your pain relief is insufficient, you may take Tylenol/Acetaminophen in addition to your narcotic pain medication.   Be careful not to exceed 4,000 mg of Tylenol/Acetaminophen in a 24 hour period from all sources.  If you are taking extra strength Tylenol/acetaminophen (500 mg), the maximum dose is 8 tablets in 24 hours.  If you are taking regular strength acetaminophen (325 mg), the maximum dose is 12 tablets in 24 hours.  Last dose of Tylenol received at 0800, 975mg. May have next dose after 2pm.     Call a doctor for any of the following:  Signs of infection (fever, growing tenderness at the surgery site, a large amount of drainage or bleeding, severe pain, foul-smelling drainage, redness, swelling).  It has been over 8 to 10 hours since surgery and you are still not able to urinate (pass water).  Headache for over 24 hours.  Numbness, tingling or weakness the day after surgery (if you had spinal anesthesia).  Signs of Covid-19 infection (temperature over 100 degrees, shortness of breath, cough, loss of taste/smell, generalized body aches, persistent headache, chills, sore throat, nausea/vomiting/diarrhea)    Your doctor is:  Dr. Iris Max, ENT Otolaryngology: [phone]                    After hours and weekends call the hospital @ [phone] and ask for the resident on call for:  ENT Otolaryngology  For emergency care, call the:  [facility]:  [phone] (TTY for hearing impaired: [phone])

## 2025-01-14 NOTE — ANESTHESIA POSTPROCEDURE EVALUATION
Patient: Anish Hauser    Procedure: Procedure(s):  deep lobe parotidectomy       Anesthesia Type:  General    Note:  Disposition: Outpatient   Postop Pain Control: Uneventful            Sign Out: Well controlled pain   PONV: No   Neuro/Psych: Uneventful            Sign Out: Acceptable/Baseline neuro status   Airway/Respiratory: Uneventful            Sign Out: Acceptable/Baseline resp. status   CV/Hemodynamics: Uneventful            Sign Out: Acceptable CV status; No obvious hypovolemia; No obvious fluid overload   Other NRE: NONE   DID A NON-ROUTINE EVENT OCCUR?            Last vitals:  Vitals Value Taken Time   /75 01/14/25 1424   Temp 36.6  C (97.9  F) 01/14/25 1304   Pulse 104 01/14/25 1424   Resp 12 01/14/25 1424   SpO2 93 % 01/14/25 1424       Electronically Signed By: Saw Vigil MD  January 14, 2025  3:38 PM

## 2025-01-14 NOTE — ANESTHESIA PROCEDURE NOTES
Airway       Patient location during procedure: OR       Procedure Start/Stop Times: 1/14/2025 9:37 AM  Staff -        CRNA: Sally Farrell APRN CRNA       Performed By: CRNA  Consent for Airway        Urgency: elective  Indications and Patient Condition       Indications for airway management: alejandra-procedural       Induction type:intravenous       Mask difficulty assessment: 1 - vent by mask    Final Airway Details       Final airway type: endotracheal airway       Successful airway: ETT - single  Endotracheal Airway Details        ETT size (mm): 6.5       Successful intubation technique: video laryngoscopy       Grade View of Cords: 1       Position: Left       Measured from: gums/teeth       Bite block used: None    Post intubation assessment        Placement verified by: capnometry, equal breath sounds and chest rise        Number of attempts at approach: 1       Secured with: tape       Ease of procedure: easy       Dentition: Intact and Unchanged    Medication(s) Administered   Medication Administration Time: 1/14/2025 9:37 AM

## 2025-01-16 ENCOUNTER — PATIENT OUTREACH (OUTPATIENT)
Dept: OTOLARYNGOLOGY | Facility: CLINIC | Age: 45
End: 2025-01-16
Payer: COMMERCIAL

## 2025-01-16 NOTE — PROGRESS NOTES
Dear Dr. Spann:    I had the pleasure of seeing Anish Hauser in follow-up today at the [facility].     History of Present Illness:   Anish Hauser is a 44 year old woman with a right parotid mass.    She saw her PCP in April 2024 with a 6-7 month history of a mass below the right ear. She was recommended for an U/S. She saw her PCP again in May 2024, recommended U/S again. U/S on 7/15/2024 demonstrated 1.8 x 1.7 x 1.5 cm mass in right parotid. She had an IR guided biopsy on 8/1/2024 which demonstrated pleomorphic adenoma on outside read, [facility].    She was seen as a new patient in October 2024 and was recommended for CT scan and attempt at repeat biopsy. CT on 10/24/2024 demonstrated a 1.5 x 1.9 x 1.6 cm well-circumscribed mass in the right parotid gland in the deep lobe. Repeat IR guided biopsy on 11/21/2024 was nondiagnostic.    She was taken to the OR on 1/14/2025 for a parotidectomy. Mass was in the deep lobe of the parotid. 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     She says that her ear is numb. She feels the top of the ear now but not the bottom. She has no issues with her face movement. She feels her surgical site and neck are swollen. She feels tightness at the incision.     MEDICATIONS:     Current Outpatient Medications   Medication Sig Dispense Refill    ferrous gluconate (FERGON) 324 (38 Fe) MG tablet Take iron pill once daily with food for 7 days/month during her period 21 tablet 4    levothyroxine (SYNTHROID/LEVOTHROID) 75 MCG tablet Take 1 tablet (75 mcg) by mouth daily. 90 tablet 3    lisinopril (ZESTRIL) 5 MG tablet Take 5 mg by mouth daily.      oxyCODONE (ROXICODONE) 5 MG tablet Take 1 tablet (5 mg) by mouth every 6 hours as needed for moderate to severe pain. 12 tablet 0    tranexamic acid (LYSTEDA) 650 MG tablet 2 tabs twice daily for up to 5 days/month during menstrual  bleeding. (Patient not taking: Reported on 2025) 20 tablet 11       ALLERGIES:    Allergies   Allergen Reactions    Dust Mite Extract Hives    Grass Extracts [Gramineae Pollens] Hives    Shellfish Allergy Hives       HABITS/SOCIAL HISTORY:   Smoked, quit in , about 1/2 ppd, about 10 years. No chewing tobacco. No alcohol.   .   Lives with family -  and kids.     Social History     Socioeconomic History    Marital status: Single     Spouse name: Murtaza Solano    Number of children: 4    Years of education: Not on file    Highest education level: Not on file   Occupational History    Occupation:  at [employer]   Tobacco Use    Smoking status: Former     Current packs/day: 0.00     Types: Cigarettes     Quit date:      Years since quittin.0     Passive exposure: Never    Smokeless tobacco: Former     Quit date: 2014    Tobacco comments:     No exposure to second hand smoking.   Vaping Use    Vaping status: Never Used   Substance and Sexual Activity    Alcohol use: No    Drug use: No    Sexual activity: Yes     Birth control/protection: Pull-out method   Other Topics Concern    Not on file   Social History Narrative    Lives with cultural  and  3/4 adult children living with them       Social Drivers of Health     Financial Resource Strain: Low Risk  (2024)    Financial Resource Strain     Within the past 12 months, have you or your family members you live with been unable to get utilities (heat, electricity) when it was really needed?: No   Food Insecurity: Low Risk  (2024)    Food Insecurity     Within the past 12 months, did you worry that your food would run out before you got money to buy more?: No     Within the past 12 months, did the food you bought just not last and you didn t have money to get more?: No   Transportation Needs: Low Risk  (2024)    Transportation Needs     Within the past 12 months, has lack of transportation kept you from medical  appointments, getting your medicines, non-medical meetings or appointments, work, or from getting things that you need?: No   Physical Activity: Insufficiently Active (2/14/2024)    Exercise Vital Sign     Days of Exercise per Week: 3 days     Minutes of Exercise per Session: 30 min   Stress: Stress Concern Present (2/14/2024)    [place] [employer] - Occupational Stress Questionnaire     Feeling of Stress : To some extent   Social Connections: Unknown (2/14/2024)    Social Connection and Isolation Panel [NHANES]     Frequency of Communication with Friends and Family: Not on file     Frequency of Social Gatherings with Friends and Family: Once a week     Attends Roman [employer] Services: Not on file     Active Member of Clubs or Organizations: Not on file     Attends Club or Organization Meetings: Not on file     Marital Status: Not on file   Interpersonal Safety: Low Risk  (1/14/2025)    Interpersonal Safety     Do you feel physically and emotionally safe where you currently live?: Yes     Within the past 12 months, have you been hit, slapped, kicked or otherwise physically hurt by someone?: No     Within the past 12 months, have you been humiliated or emotionally abused in other ways by your partner or ex-partner?: No   Housing Stability: High Risk (2/14/2024)    Housing Stability     Do you have housing? : No     Are you worried about losing your housing?: No       PAST MEDICAL HISTORY:   Past Medical History:   Diagnosis Date    Head injury, closed, sequela 01/16/2018    Heartburn     History of angina     History of blood transfusion     Hypertension 2023    Iron deficiency anemia due to chronic blood loss 11/25/2022    Motion sickness     Seasonal allergies     Thyroid disease         PAST SURGICAL HISTORY:   Past Surgical History:   Procedure Laterality Date    BREAST CYST INCISION AND DRAINAGE Left     sebacous cyst, Dr. Sidhu    COLONOSCOPY N/A 2/24/2023    Procedure: COLONOSCOPY;  Surgeon:  "Montana Tadeo MD;  Location:  GI    IR FINE NEEDLE ASPIRATION W ULTRASOUND  11/21/2024    LAPAROSCOPIC CHOLECYSTECTOMY N/A 1/25/2022    Procedure: CHOLECYSTECTOMY, LAPAROSCOPIC;  Surgeon: Julissa Mcmullen DO;  Location: [facility] OR    PAROTIDECTOMY Right 1/14/2025    Procedure: deep lobe parotidectomy;  Surgeon: Iris Max MD;  Location: [facility] OR       FAMILY HISTORY:    Family History   Problem Relation Age of Onset    Diabetes Mother     Hypertension Mother     Hyperlipidemia Mother     Uterine Cancer Mother         pre cancer    No Known Problems Father     No Known Problems Sister     No Known Problems Sister     No Known Problems Sister     No Known Problems Sister     Liver Disease Brother         fatty liver    IgA nephropathy Brother         on dialysis    Hypertension Brother     Hepatitis Maternal Grandmother     Hypertension Maternal Grandmother     IgA nephropathy Daughter     Hyperparathyroidism Daughter     No Known Problems Son     Coronary Artery Disease No family hx of     Breast Cancer No family hx of     Cancer - colorectal No family hx of     Ovarian Cancer No family hx of     Prostate Cancer No family hx of     Other Cancer No family hx of     Mental Illness No family hx of     Cerebrovascular Disease No family hx of     Anesthesia Reaction No family hx of     Asthma No family hx of     Osteoporosis No family hx of     Known Genetic Syndrome No family hx of     Obesity No family hx of     Unknown/Adopted No family hx of        REVIEW OF SYSTEMS:  12 point ROS was negative other than the symptoms noted above in the HPI.  Patient Supplied Answers to Review of Systems      1/22/2025     3:35 AM    ENT ROS   Neurology Numbness    Headache   Ears, Nose, Throat Ringing/noise in ears   Gastrointestinal/Genitourinary Diarrhea         PHYSICAL EXAMINATION:   Ht 1.499 m (4' 11\")   Wt 94.3 kg (208 lb)   LMP 12/28/2024   BMI 42.01 kg/m    Patient in NAD  Breathing comfortably on room " air  Parotid incision healing appropriately without drainage or dehiscence  Mild swelling of surgical site, no seroma, nothing expressed from NOY drain site  Facial nerve function normal    PROCEDURES:     RESULTS REVIEWED:   I reviewed path report       IMPRESSION AND PLAN:   Anish Hauser is a 44 year old woman with a right parotid mass. She had parotidectomy on 1/14/2025.     Final pathology was reviewed.     Anticipate improvement in her swelling.    Wound care was discussed.    Follow-up PRN.       Thank you very much for the opportunity to participate in the care of your patient.      Iris Max MD  Otolaryngology- Head & Neck Surgery      This note was dictated with voice recognition software and then edited. Please excuse any unintentional errors.         CC:  Romelia Spann MD  [address] [place] [id number]

## 2025-01-16 NOTE — TELEPHONE ENCOUNTER
Responsible Attending Physician: Winter  Date of Discharge: 1/14/2025  Discharge to: Home     Current Status:  Pt is a 43 y/o female s/p parotid gland excision on 1/14. Reports pre-op symptoms improved. Operative  pain is decreasing. Ambulating without assistance. Pain well controlled with current meds, ample supply.  Reports incision CDI without signs of infection. Denies redness, swelling, increased tenderness, or elevated temp. Denies current bowel or bladder issues.      Discharge instructions and medication use were reviewed. RN triage/on call number given: [phone] or after hours and w/e - [phone]. Patient verbalized understanding and agreement with current plan.     Follow up appointments/imaging/tests needed: patient scheduled for nurse visit for NOY drain removal on 1/17. Patient confirmed appointment details and will follow up as scheduled.    Leslie TOLBERTN, RN

## 2025-01-22 ENCOUNTER — OFFICE VISIT (OUTPATIENT)
Dept: OTOLARYNGOLOGY | Facility: CLINIC | Age: 45
End: 2025-01-22
Payer: COMMERCIAL

## 2025-01-22 VITALS — HEIGHT: 59 IN | BODY MASS INDEX: 41.93 KG/M2 | WEIGHT: 208 LBS

## 2025-01-22 DIAGNOSIS — K11.8 PAROTID MASS: Primary | ICD-10-CM

## 2025-01-22 NOTE — LETTER
1/22/2025       RE: Anish Hauser  [address]  [place] [address]     Dear Colleague,    Thank you for referring your patient, Anish Hauser, to the [facility] [place] at [facility]. Please see a copy of my visit note below.    Dear Dr. Spann:    I had the pleasure of seeing Anish Hauser in follow-up today at the [facility].     History of Present Illness:   Anish Hauser is a 44 year old woman with a right parotid mass.    She saw her PCP in April 2024 with a 6-7 month history of a mass below the right ear. She was recommended for an U/S. She saw her PCP again in May 2024, recommended U/S again. U/S on 7/15/2024 demonstrated 1.8 x 1.7 x 1.5 cm mass in right parotid. She had an IR guided biopsy on 8/1/2024 which demonstrated pleomorphic adenoma on outside [facility].    She was seen as a new patient in October 2024 and was recommended for CT scan and attempt at repeat biopsy. CT on 10/24/2024 demonstrated a 1.5 x 1.9 x 1.6 cm well-circumscribed mass in the right parotid gland in the deep lobe. Repeat IR guided biopsy on 11/21/2024 was nondiagnostic.    She was taken to the OR on 1/14/2025 for a parotidectomy. Mass was in the deep lobe of the parotid. 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     She says that her ear is numb. She feels the top of the ear now but not the bottom. She has no issues with her face movement. She feels her surgical site and neck are swollen. She feels tightness at the incision.     MEDICATIONS:     Current Outpatient Medications   Medication Sig Dispense Refill     ferrous gluconate (FERGON) 324 (38 Fe) MG tablet Take iron pill once daily with food for 7 days/month during her period 21 tablet 4     levothyroxine (SYNTHROID/LEVOTHROID) 75 MCG tablet Take 1 tablet (75 mcg) by mouth daily. 90  tablet 3     lisinopril (ZESTRIL) 5 MG tablet Take 5 mg by mouth daily.       oxyCODONE (ROXICODONE) 5 MG tablet Take 1 tablet (5 mg) by mouth every 6 hours as needed for moderate to severe pain. 12 tablet 0     tranexamic acid (LYSTEDA) 650 MG tablet 2 tabs twice daily for up to 5 days/month during menstrual bleeding. (Patient not taking: Reported on 2025) 20 tablet 11       ALLERGIES:    Allergies   Allergen Reactions     Dust Mite Extract Hives     Grass Extracts [Gramineae Pollens] Hives     Shellfish Allergy Hives       HABITS/SOCIAL HISTORY:   Smoked, quit in , about 1/2 ppd, about 10 years. No chewing tobacco. No alcohol.   .   Lives with family -  and kids.     Social History     Socioeconomic History     Marital status: Single     Spouse name: Murtaza Solano     Number of children: 4     Years of education: Not on file     Highest education level: Not on file   Occupational History     Occupation:  at [employer]   Tobacco Use     Smoking status: Former     Current packs/day: 0.00     Types: Cigarettes     Quit date:      Years since quittin.0     Passive exposure: Never     Smokeless tobacco: Former     Quit date: 2014     Tobacco comments:     No exposure to second hand smoking.   Vaping Use     Vaping status: Never Used   Substance and Sexual Activity     Alcohol use: No     Drug use: No     Sexual activity: Yes     Birth control/protection: Pull-out method   Other Topics Concern     Not on file   Social History Narrative    Lives with cultural  and  3/4 adult children living with them       Social Drivers of Health     Financial Resource Strain: Low Risk  (2024)    Financial Resource Strain      Within the past 12 months, have you or your family members you live with been unable to get utilities (heat, electricity) when it was really needed?: No   Food Insecurity: Low Risk  (2024)    Food Insecurity      Within the past 12 months, did you worry  that your food would run out before you got money to buy more?: No      Within the past 12 months, did the food you bought just not last and you didn t have money to get more?: No   Transportation Needs: Low Risk  (2/14/2024)    Transportation Needs      Within the past 12 months, has lack of transportation kept you from medical appointments, getting your medicines, non-medical meetings or appointments, work, or from getting things that you need?: No   Physical Activity: Insufficiently Active (2/14/2024)    Exercise Vital Sign      Days of Exercise per Week: 3 days      Minutes of Exercise per Session: 30 min   Stress: Stress Concern Present (2/14/2024)    [employer] [employer] - Occupational Stress Questionnaire      Feeling of Stress : To some extent   Social Connections: Unknown (2/14/2024)    Social Connection and Isolation Panel [NHANES]      Frequency of Communication with Friends and Family: Not on file      Frequency of Social Gatherings with Friends and Family: Once a week      Attends Temple Services: Not on file      Active Member of Clubs or Organizations: Not on file      Attends Club or Organization Meetings: Not on file      Marital Status: Not on file   Interpersonal Safety: Low Risk  (1/14/2025)    Interpersonal Safety      Do you feel physically and emotionally safe where you currently live?: Yes      Within the past 12 months, have you been hit, slapped, kicked or otherwise physically hurt by someone?: No      Within the past 12 months, have you been humiliated or emotionally abused in other ways by your partner or ex-partner?: No   Housing Stability: High Risk (2/14/2024)    Housing Stability      Do you have housing? : No      Are you worried about losing your housing?: No       PAST MEDICAL HISTORY:   Past Medical History:   Diagnosis Date     Head injury, closed, sequela 01/16/2018     Heartburn      History of angina      History of blood transfusion      Hypertension  2023     Iron deficiency anemia due to chronic blood loss 11/25/2022     Motion sickness      Seasonal allergies      Thyroid disease         PAST SURGICAL HISTORY:   Past Surgical History:   Procedure Laterality Date     BREAST CYST INCISION AND DRAINAGE Left     sebacous cyst, Dr. Sidhu     COLONOSCOPY N/A 2/24/2023    Procedure: COLONOSCOPY;  Surgeon: Montana Tadeo MD;  Location:  GI     IR FINE NEEDLE ASPIRATION W ULTRASOUND  11/21/2024     LAPAROSCOPIC CHOLECYSTECTOMY N/A 1/25/2022    Procedure: CHOLECYSTECTOMY, LAPAROSCOPIC;  Surgeon: Julissa Mcmullen DO;  Location: [facility] OR     PAROTIDECTOMY Right 1/14/2025    Procedure: deep lobe parotidectomy;  Surgeon: Iris Max MD;  Location: [facility] OR       FAMILY HISTORY:    Family History   Problem Relation Age of Onset     Diabetes Mother      Hypertension Mother      Hyperlipidemia Mother      Uterine Cancer Mother         pre cancer     No Known Problems Father      No Known Problems Sister      No Known Problems Sister      No Known Problems Sister      No Known Problems Sister      Liver Disease Brother         fatty liver     IgA nephropathy Brother         on dialysis     Hypertension Brother      Hepatitis Maternal Grandmother      Hypertension Maternal Grandmother      IgA nephropathy Daughter      Hyperparathyroidism Daughter      No Known Problems Son      Coronary Artery Disease No family hx of      Breast Cancer No family hx of      Cancer - colorectal No family hx of      Ovarian Cancer No family hx of      Prostate Cancer No family hx of      Other Cancer No family hx of      Mental Illness No family hx of      Cerebrovascular Disease No family hx of      Anesthesia Reaction No family hx of      Asthma No family hx of      Osteoporosis No family hx of      Known Genetic Syndrome No family hx of      Obesity No family hx of      Unknown/Adopted No family hx of        REVIEW OF SYSTEMS:  12 point ROS was negative other than the  "symptoms noted above in the HPI.  Patient Supplied Answers to Review of Systems      1/22/2025     3:35 AM   [facility] ENT ROS   Neurology Numbness    Headache   Ears, Nose, Throat Ringing/noise in ears   Gastrointestinal/Genitourinary Diarrhea         PHYSICAL EXAMINATION:   Ht 1.499 m (4' 11\")   Wt 94.3 kg (208 lb)   LMP 12/28/2024   BMI 42.01 kg/m    Patient in NAD  Breathing comfortably on room air  Parotid incision healing appropriately without drainage or dehiscence  Mild swelling of surgical site, no seroma, nothing expressed from NOY drain site  Facial nerve function normal    PROCEDURES:     RESULTS REVIEWED:   I reviewed path report       IMPRESSION AND PLAN:   Anish Hauser is a 44 year old woman with a right parotid mass. She had parotidectomy on 1/14/2025.     Final pathology was reviewed.     Anticipate improvement in her swelling.    Wound care was discussed.    Follow-up PRN.       Thank you very much for the opportunity to participate in the care of your patient.      Iris Max MD  Otolaryngology- Head & Neck Surgery      This note was dictated with voice recognition software and then edited. Please excuse any unintentional errors.         CC:  Romelia pSann MD  [address] [place], [place] [id number]      Again, thank you for allowing me to participate in the care of your patient.      Sincerely,    Iris Max MD    "

## 2025-01-29 NOTE — PROGRESS NOTES
"NEUROLOGY FOLLOW UP VISIT  NOTE       [facility] [place]  [address] [place] [address]  Tel: [phone]  Fax: [phone]  [email or web address]     Anish Hauser DOB 1980, MRN [id number]  PCP: Bibi Colunga  Date: 2025      ASSESSMENT & PLAN     Visit Diagnosis  Muscle twitch  Simple partial seizure (H)     Myoclonic twitches  45-year-old female with history of morbid obesity, hypothyroidism, prediabetes, pleomorphic parotid adenoma who was evaluated for intermittent muscle twitches that she noticed \"inside her brain\".  Since her last visit she had an MRI of the brain that showed scattered supratentorial FLAIR hyperintensity greater than expected for her age.  This likely is due to her prediabetes and hypertension that currently is not being treated.  EEG was also normal.  She also had a EMG done at [facility] that was normal.  I have explained to her that her workup so far is negative and I suspect her symptoms are due to underlying stress and at present I would just reassure her and avoid any medication.  However if she continues to have myoclonic twitches we can use low-dose clonazepam.  Follow-up will be on as needed basis.    Thank you again for this referral, please feel free to contact me if you have any questions.    Juan Carlos Castillo MD  [facility]     HISTORY OF PRESENT ILLNESS     Patient is a 45-year-old female with history of morbid obesity, hypothyroidism, prediabetes, pleomorphic parotid adenoma who was initially seen on 2025 for intermittent muscle twitches that she notices inside the brain in the right ear.  She also reported twitches on the right side of the body but cannot see it.  Patient has pleomorphic parotid adenoma and is being followed by ENT.  I suspected these myoclonic twitches due to underlying anxiety but recommended MRI brain that showed Showed no epileptic focus with scattered supratentorial " FLAIR hyperintensities greater than expected for age.  Postsurgical changes of the right parotid gland.  EEG was normal .  Since her last visit she also had an EMG done that was normal.  She was relieved to learn that her MRI and EEG are normal.  Does admit to some stressors in her life.  She had concerns about MRI finding and I have told her this likely is due to her prediabetes and undiagnosed/untreated HTN     PROBLEM LIST   Patient Active Problem List   Diagnosis    Morbid obesity (H)    Hypothyroidism due to Hashimoto's thyroiditis    Hidradenitis    Prediabetes    Iron deficiency anemia due to chronic blood loss    Family history of kidney disease    Primary hypertension    Vitamin D deficiency    Pleomorphic adenoma of parotid gland    Menorrhagia with regular cycle    Advanced directives, counseling/discussion    Parotid mass         PAST MEDICAL & SURGICAL HISTORY     Past Medical History:   Patient  has a past medical history of Basal cell adenoma (01/2025), Head injury, closed, sequela (01/16/2018), Heartburn, History of angina, History of blood transfusion, Hypertension (2023), Iron deficiency anemia due to chronic blood loss (11/25/2022), Motion sickness, Seasonal allergies, and Thyroid disease.    Surgical History:  She  has a past surgical history that includes Breast Cyst Incision And Drainage (Left); Laparoscopic cholecystectomy (N/A, 1/25/2022); Colonoscopy (N/A, 2/24/2023); IR Fine Needle Aspiration w Ultrasound (11/21/2024); and Parotidectomy (Right, 1/14/2025).     SOCIAL HISTORY     Reviewed, and she  reports that she quit smoking about 16 years ago. Her smoking use included cigarettes. She has never been exposed to tobacco smoke. She quit smokeless tobacco use about 10 years ago. She reports that she does not drink alcohol and does not use drugs.     FAMILY HISTORY     Reviewed, and family history includes Diabetes in her mother; Hepatitis in her maternal grandmother; Hyperlipidemia in her  "mother; Hyperparathyroidism in her daughter; Hypertension in her brother, maternal grandmother, and mother; IgA nephropathy in her brother and daughter; Liver Disease in her brother; No Known Problems in her father, sister, sister, sister, sister, and son; Uterine Cancer in her mother.     ALLERGIES     Allergies   Allergen Reactions    Dust Mite Extract Hives    Grass Extracts [Gramineae Pollens] Hives    Shellfish Allergy Hives         REVIEW OF SYSTEMS     A 12 point review of system was performed and was negative except as outlined in the history of present illness.     HOME MEDICATIONS     Current Outpatient Rx   Medication Sig Dispense Refill    ferrous gluconate (FERGON) 324 (38 Fe) MG tablet Take iron pill once daily with food for 7 days/month during her period 21 tablet 4    levothyroxine (SYNTHROID/LEVOTHROID) 75 MCG tablet Take 1 tablet (75 mcg) by mouth daily. 90 tablet 3    lisinopril (ZESTRIL) 5 MG tablet Take 5 mg by mouth daily.      oxyCODONE (ROXICODONE) 5 MG tablet Take 1 tablet (5 mg) by mouth every 6 hours as needed for moderate to severe pain. 12 tablet 0    tranexamic acid (LYSTEDA) 650 MG tablet 2 tabs twice daily for up to 5 days/month during menstrual bleeding. 20 tablet 11         PHYSICAL EXAM     Vital signs  BP (!) 146/97 (BP Location: Right arm, Patient Position: Sitting)   Pulse 67   Ht 1.499 m (4' 11\")   Wt 94.3 kg (208 lb)   LMP 12/28/2024   BMI 42.01 kg/m      Weight:   208 lbs 0 oz    Patient is alert and oriented x4 in no acute distress. Vital signs were reviewed and are documented in electronic medical record. Neck was supple, no carotid bruits, thyromegaly, JVD, or lymphadenopathy was noted.   NEUROLOGY EXAM:   Patient s speech was normal with no aphasia or dysarthria. Mentation, and affect were also normal.    Funduscopic exam was normal, with normal cup to disc ratio. Cranial nerves II -XII were intact.    Patient had normal mass, tone and motor strength was 5/5 in all " extremities without pronator drift.    Sensation was intact to light touch, pinprick, and vibratory sensation.    Reflexes were 1+ symmetrical with downgoing toes.    No dysmetria noted on FNF or HKS. Romberg was negative.   Gait testing was normal. Able to walk on toes/heels. Tandem walk normal.     PERTINENT DIAGNOSTIC STUDIES     Following studies were reviewed:     CT SOFT TISSUE NECK 10/24/2024  1. Right parotid gland solid mass measuring 1.5 x 1.9 x 1.6 cm,  grossly stable compared to the ultrasound completed on 7/15/2024 given  differences in technique. Mass is located just deep to the traversing  right external jugular vein.  2. No additional masses or suspicious cervical lymphadenopathy.  3. 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    MRI BRAIN 1/30/2025  1.  No epileptogenic focus identified.  2.  No acute intracranial process.  3.  Scattered supratentorial FLAIR hyperintensities, greater than expected for age and favored to reflect mild sequela of chronic microvascular ischemia.  4.  Postsurgical changes of the right carotid gland. Small rim-enhancing fluid collections along the posterior aspect of the parotid gland, favored to reflect seromas, although sterility of the collections is not assessed on imaging.    EEG 2/4/2025  This is a normal awake and drowsy EEG.  Please note that the absence of epileptiform abnormalities on EEG does not rule out the possibility of seizures.    EMG 1/24/2025  --There is no electrodiagnostic evidence of polyneuropathy or myopathy.     --There is no electrodiagnostic evidence of focal mononeuropathy in the left upper and left lower extremities.     PERTINENT LABS  Following labs were reviewed:  Hospital Outpatient Visit on 01/14/2025   Component Date Value Ref Range Status    HCG Qual Urine 01/14/2025 Negative  Negative Final    Internal QC Check POCT 01/14/2025 Valid  Valid Final    POCT Kit Lot Number 01/14/2025  [id number]   Final    POCT Kit Expiration Date 01/14/2025 2026-05-04   Final    Case Report 01/14/2025    Final                    Value:Surgical Pathology Report                         Case: [id number]                                  Authorizing Provider:  Iris Max MD     Collected:           01/14/2025 12:02 PM          Ordering Location:     [facility] OR  Received:            01/14/2025 12:09 PM                                 [place]                                                                  Pathologist:           Julieta Echavarria MD                                                   Intraop:               Shea Cazares MD                                                   Specimens:   A) - Parotid Gland, Right, Right Deep Lobe Parotid Mass                                             B) - Parotid Gland, Right, Superficial Parotid                                             Final Diagnosis 01/14/2025    Final                    Value:A.  PAROTID GLAND, RIGHT DEEP LOBE MASS, EXCISION:  -Basal cell adenoma, 1.8 cm in greatest dimension.  -Margins are negative.  -Background salivary gland with no histopathologic abnormality.  -Three benign lymph nodes.  -No evidence of malignancy identified.    B.  PAROTID GLAND, SUPERFICIAL, EXCISION:  -Benign salivary gland.      Clinical Information 01/14/2025    Final                    Value:Procedure:  EXCISION, PAROTID GLAND - Right  Pre-op Diagnosis: Parotid mass [K11.8]  Post-op Diagnosis: K11.8 - Parotid mass [ICD-10-CM]      Intraoperative Consultation 01/14/2025    Final                    Value:A(1). Parotid Gland, Right, Right Deep Lobe Parotid Mass:  AFS1:  -Negative for malignancy (favor basal cell adenoma (versus pleomorphic adenoma))    Shea Cazares MD on 1/14/2025 at 12:38 PM  Intra op performed at:[facility], [facility], [address] [place] [address], 1st Floor Lab,  "[facility] [facility], [facility] [address]  Intra-op Dx verbally delivered to Dr. Max      Gross Description 01/14/2025    Final                    Value:A(1). Parotid Gland, Right, Right Deep Lobe Parotid Mass:  The specimen is received fresh for intraoperative consultation, with proper patient identification, labeled \"right deep lobe parotid mass\".  It consists of an intact, unoriented 8.3 g, 4.1 x 3.1 x 2.3 cm deep parotid gland without accompanying structures. The superficial external surface is remarkable for tan-pink, fascia. The deep resection margin is gray-tan, smooth, cauterized, and unremarkable.     Ink code: superficial fascial plane-blue, deep cauterized resection margin-black.    On serial sectioning, there is a 1.8 x 1.7 x 1.6 cm single well-demarcated, encapsulated, solid, tan-pink to white, lobular mass that abuts the inked black and deep margin and comes to within 0.2 cm of the overlying inked blue fascia. The mass does appear to abuts the capsule on the deep aspect. The remaining parenchyma is gray-tan to yellow and glandular.     On palpation of the periglandular adipose tissue there are two 0.3 x 0.2 x 0.1 cm and                           0.6 x 0.4 x 0.3 cm tan-red lymph node candidates identified.  No nerve is identified.  A representative section is submitted for frozen section analysis, now submitted as A1 FS, with additional representative sections are submitted as follows:  A1FS.   Mass to deep aspect, frozen section remnant, perpendicular, representative  A2.   Mass abutting one end at the deep aspect, perpendicular  A3.   Mass, greatest effacement, abutting deep aspect and closest overlying fascia, perpendicular  A4.   Mass, contiguous with frozen section remnant, perpendicular  A5.   Additional mass to uninvolved parotid parenchyma to include 1 periglandular lymph node candidate  A6. Uninvolved parotid parenchyma to include 1 periglandular lymph node candidate  B(2). Parotid Gland, Right, " "Superficial Parotid:  The specimen is received in formalin with proper patient identification, labeled \"right parotid, superficial\".  The specimen consists of a cauterized, unoriented, 2.6 g, 3.1 x 1.5 x 1.8 cm superficial                           parotid gland, without accompanying structures.  The external surfaces remarkable for a minimal amount of red-brown, skeletal muscle while the opposing deep resection margin is remarkable for cauterized parotid parenchyma.    Ink code: external surface with skeletal muscle-blue, deep cauterized resection margin-black.     On serial sectioning, the salivary gland parenchyma is gray-tan to yellow, granular, and unremarkable.  No lymph nodes are grossly appreciated.  No masses are identified.  Representative sections are submitted as B1-2.      Microscopic Description 01/14/2025    Final                    Value:Microscopic examination has been performed.  Representative blocks are A1 and A3.    I have personally reviewed all specimens and or slides, including the listed special stains, and used them with my medical judgement to determine the final diagnosis.      Performing Labs 01/14/2025    Final                    Value:The technical component of this testing was completed at [facility].    Stain controls for all stains resulted within this report have been reviewed and show appropriate reactivity.      Office Visit on 01/06/2025   Component Date Value Ref Range Status    Human Papilloma Virus 16 DNA 01/06/2025 Negative  Negative Final    Human Papilloma Virus 18 DNA 01/06/2025 Negative  Negative Final    Human Papilloma Virus Other 01/06/2025 Negative  Negative Final    FINAL DIAGNOSIS 01/06/2025    Final                    Value:This patient's sample is negative for high risk HPV DNA.          METHODOLOGY: The [employer] system uses automated extraction, simultaneous amplification of HPV (E6/E7 oncogenes) and " beta-globin, followed by real time detection of fluorescent labeled HPV and beta globin using specific oligonucleotide probes. The test specifically identifies types HPV 16 DNA and HPV 18 DNA while concurrently detecting the rest of the high risk types (31, 33, 35, 39, 45, 51, 52, 56, 58, 59, 66 or 68).     COMMENTS: This test is not intended for use as a screening device for woman under age 30 with normal cervical cytology. Results should be correlated with cytologic and histologic findings. Close clinical follow up is recommended.    Please see the separate Gynecologic Cytology (Pap) report from the same collection date.      Ventricular Rate 01/06/2025 69  BPM Final    Atrial Rate 01/06/2025 69  BPM Final    CT Interval 01/06/2025 156  ms Final    QRS Duration 01/06/2025 84  ms Final    QT 01/06/2025 434  ms Final    QTc 01/06/2025 465  ms Final    P Axis 01/06/2025 23  degrees Final    R AXIS 01/06/2025 16  degrees Final    T Axis 01/06/2025 -11  degrees Final    Interpretation ECG 01/06/2025    Final                    Value:Sinus rhythm  Normal ECG  When compared with ECG of 24-Feb-2020 10:06,  No significant change was found  Confirmed by CONOR ARCOS, LES LOC:JN ([id number]) on 1/6/2025 10:54:06 AM      WBC Count 01/06/2025 6.3  4.0 - 11.0 10e3/uL Final    RBC Count 01/06/2025 4.42  3.80 - 5.20 10e6/uL Final    Hemoglobin 01/06/2025 12.4  11.7 - 15.7 g/dL Final    Hematocrit 01/06/2025 39.3  35.0 - 47.0 % Final    MCV 01/06/2025 89  78 - 100 fL Final    MCH 01/06/2025 28.1  26.5 - 33.0 pg Final    MCHC 01/06/2025 31.6  31.5 - 36.5 g/dL Final    RDW 01/06/2025 14.0  10.0 - 15.0 % Final    Platelet Count 01/06/2025 260  150 - 450 10e3/uL Final    Ferritin 01/06/2025 40  6 - 175 ng/mL Final    hCG Urine Qualitative 01/06/2025 Negative  Negative Final    Sodium 01/06/2025 137  135 - 145 mmol/L Final    Potassium 01/06/2025 3.9  3.4 - 5.3 mmol/L Final    Carbon Dioxide (CO2) 01/06/2025 24  22 - 29 mmol/L Final    Anion  Gap 01/06/2025 11  7 - 15 mmol/L Final    Urea Nitrogen 01/06/2025 15.8  6.0 - 20.0 mg/dL Final    Creatinine 01/06/2025 0.93  0.51 - 0.95 mg/dL Final    GFR Estimate 01/06/2025 78  >60 mL/min/1.73m2 Final    Calcium 01/06/2025 9.1  8.8 - 10.4 mg/dL Final    Chloride 01/06/2025 102  98 - 107 mmol/L Final    Glucose 01/06/2025 89  70 - 99 mg/dL Final    Alkaline Phosphatase 01/06/2025 72  40 - 150 U/L Final    AST 01/06/2025 22  0 - 45 U/L Final    ALT 01/06/2025 11  0 - 50 U/L Final    Protein Total 01/06/2025 7.3  6.4 - 8.3 g/dL Final    Albumin 01/06/2025 4.1  3.5 - 5.2 g/dL Final    Bilirubin Total 01/06/2025 0.6  <=1.2 mg/dL Final    Vitamin D, Total (25-Hydroxy) 01/06/2025 29  20 - 50 ng/mL Final    Interpretation 01/06/2025 Negative for Intraepithelial Lesion or Malignancy (NILM)    Final    Comment 01/06/2025    Final                    Value:  Papanicolaou Test Limitations:  Cervical cytology is a screening test with limited sensitivity, and regular screening is critical for cancer prevention.  Pap tests are primarily effective for the diagnosis/prevention of squamous cell carcinoma, not adenocarcinoma or other cancers.        Specimen Adequacy 01/06/2025 Satisfactory for evaluation, endocervical/transformation zone component present   Final    Clinical Information 01/06/2025    Final                    Value:none      Previous Abnormal? 01/06/2025    Final                    Value:No      Performing Labs 01/06/2025    Final                    Value:The technical component of this testing was completed at [facility].    Stain controls for all stains resulted within this report have been reviewed and show appropriate reactivity.      Associated HPV Report 01/06/2025    Final                    Value:Please see the associated HPV High Risk Types DNA Cervical report for Specimen [id number] from the same collection date.     Admission on 11/21/2024,  Discharged on 11/21/2024   Component Date Value Ref Range Status    INR 11/21/2024 0.94  0.85 - 1.15 Final    Final Diagnosis 11/21/2024    Final                    Value:Specimen A     Interpretation:      Nondiagnostic     Adequacy:     Unsatisfactory for evaluation, Scant cellularity          Comment 11/21/2024    Final                    Value:There is insufficient tissue present for evaluation. Clinical correlation recommended with consideration of additional tissue sampling if clinically warranted.       Clinical Information 11/21/2024    Final                    Value:Right parotid mass.       Rapid Onsite Evaluation 11/21/2024    Final                    Value:FNA Performance:   Fine needle aspiration was not performed by [facility] [place] [facility] staff.    Aspirate immediate study/adequacy:  I, CARLOS GONZALEZ MD, attest that I immediately examined smears while the procedure was underway and determined or confirmed the adequacy of the specimens via telepathology.    It is of note that the final assessment and report may be performed and signed by a different pathologist.    Onsite adequacy/interpretation:  A: Inadequate\      Gross Description 11/21/2024    Final                    Value:A. Parotid Gland, Right, Fine Needle Aspirate:  Received are 3 fixed slides, processed for Pap stain, 3 air dried slides, processed for Diff Quik stain, and material in formalin, processed for one hematoxylin stained cell block.      Microscopic Description 11/21/2024    Final                    Value:A microscopic examination was performed.     Case was reviewed by the following:  Resident Pathologist: Lenin Bryant MD  Pathology Fellow: Denice Kay MD  Pathology Fellow: Alicia Rivera DO  A resident or fellow in a training program was involved in the initial review, preparation, and/or interpretation of this case.  I, as the senior physician, attest that I have personally reviewed all specimens  and or slides, including the listed special stains, and used them with my medical judgement to determine the final diagnosis.              Performing Labs 11/21/2024    Final                    Value:The technical component of this testing was completed at [facility].     Stain controls for all stains resulted within this report have been reviewed and show appropriate reactivity.            Total time spent for face to face visit, reviewing labs/imaging studies, counseling and coordination of care was: 20 minutes spent on the date of the encounter doing chart review, review of outside records, review of test results, interpretation of tests, patient visit, and documentation     The longitudinal plan of care for the diagnosis(es)/condition(s) as documented were addressed during this visit. Due to the added complexity in care, I will continue to support Anish in the subsequent management and with ongoing continuity of care.    This note was dictated using voice recognition software.  Any grammatical or context distortions are unintentional and inherent to the software.    No orders of the defined types were placed in this encounter.     New Prescriptions    No medications on file     Modified Medications    No medications on file

## 2025-02-04 ENCOUNTER — ANCILLARY PROCEDURE (OUTPATIENT)
Dept: NEUROLOGY | Facility: CLINIC | Age: 45
End: 2025-02-04
Attending: PSYCHIATRY & NEUROLOGY
Payer: COMMERCIAL

## 2025-02-04 VITALS
HEIGHT: 59 IN | DIASTOLIC BLOOD PRESSURE: 97 MMHG | SYSTOLIC BLOOD PRESSURE: 146 MMHG | BODY MASS INDEX: 41.93 KG/M2 | HEART RATE: 67 BPM | WEIGHT: 208 LBS

## 2025-02-04 DIAGNOSIS — R25.3 MUSCLE TWITCH: ICD-10-CM

## 2025-02-04 DIAGNOSIS — G40.109 SIMPLE PARTIAL SEIZURE (H): ICD-10-CM

## 2025-02-04 DIAGNOSIS — R25.3 MUSCLE TWITCH: Primary | ICD-10-CM

## 2025-02-04 PROCEDURE — 95816 EEG AWAKE AND DROWSY: CPT | Performed by: PSYCHIATRY & NEUROLOGY

## 2025-02-04 PROCEDURE — 99024 POSTOP FOLLOW-UP VISIT: CPT | Performed by: PSYCHIATRY & NEUROLOGY

## 2025-02-04 NOTE — NURSING NOTE
Chief Complaint   Patient presents with    Subjective muscle twitching     EEG and MRI results      Brittany SCHILLING CMA on 2/4/2025 at 10:17 AM  [facility]

## 2025-02-04 NOTE — LETTER
"2025      Anish Hauser   [address]  [place] [address]      Dear Colleague,    Thank you for referring your patient, Anish Hauser, to the [facility] [place]. Please see a copy of my visit note below.    NEUROLOGY FOLLOW UP VISIT  NOTE       [facility] [place]  [address] [place] [address]  Tel: [phone]  Fax: [phone]  [email or web address]     Anish Hauser,  1980, MRN [id number]  PCP: Bibi Colunga  Date: 2025      ASSESSMENT & PLAN     Visit Diagnosis  Muscle twitch  Simple partial seizure (H)     Myoclonic twitches  45-year-old female with history of morbid obesity, hypothyroidism, prediabetes, pleomorphic parotid adenoma who was evaluated for intermittent muscle twitches that she noticed \"inside her brain\".  Since her last visit she had an MRI of the brain that showed scattered supratentorial FLAIR hyperintensity greater than expected for her age.  This likely is due to her prediabetes and hypertension that currently is not being treated.  EEG was also normal.  She also had a EMG done at [facility] that was normal.  I have explained to her that her workup so far is negative and I suspect her symptoms are due to underlying stress and at present I would just reassure her and avoid any medication.  However if she continues to have myoclonic twitches we can use low-dose clonazepam.  Follow-up will be on as needed basis.    Thank you again for this referral, please feel free to contact me if you have any questions.    Juan Carlos Castillo MD  [facility], [place]     HISTORY OF PRESENT ILLNESS     Patient is a 45-year-old female with history of morbid obesity, hypothyroidism, prediabetes, pleomorphic parotid adenoma who was initially seen on 2025 for intermittent muscle twitches that she notices inside the brain in the right ear.  She also reported twitches on the right side of the body but cannot see it.  " Patient has pleomorphic parotid adenoma and is being followed by ENT.  I suspected these myoclonic twitches due to underlying anxiety but recommended MRI brain that showed Showed no epileptic focus with scattered supratentorial FLAIR hyperintensities greater than expected for age.  Postsurgical changes of the right parotid gland.  EEG was normal .  Since her last visit she also had an EMG done that was normal.  She was relieved to learn that her MRI and EEG are normal.  Does admit to some stressors in her life.  She had concerns about MRI finding and I have told her this likely is due to her prediabetes and undiagnosed/untreated HTN     PROBLEM LIST   Patient Active Problem List   Diagnosis     Morbid obesity (H)     Hypothyroidism due to Hashimoto's thyroiditis     Hidradenitis     Prediabetes     Iron deficiency anemia due to chronic blood loss     Family history of kidney disease     Primary hypertension     Vitamin D deficiency     Pleomorphic adenoma of parotid gland     Menorrhagia with regular cycle     Advanced directives, counseling/discussion     Parotid mass         PAST MEDICAL & SURGICAL HISTORY     Past Medical History:   Patient  has a past medical history of Basal cell adenoma (01/2025), Head injury, closed, sequela (01/16/2018), Heartburn, History of angina, History of blood transfusion, Hypertension (2023), Iron deficiency anemia due to chronic blood loss (11/25/2022), Motion sickness, Seasonal allergies, and Thyroid disease.    Surgical History:  She  has a past surgical history that includes Breast Cyst Incision And Drainage (Left); Laparoscopic cholecystectomy (N/A, 1/25/2022); Colonoscopy (N/A, 2/24/2023); IR Fine Needle Aspiration w Ultrasound (11/21/2024); and Parotidectomy (Right, 1/14/2025).     SOCIAL HISTORY     Reviewed, and she  reports that she quit smoking about 16 years ago. Her smoking use included cigarettes. She has never been exposed to tobacco smoke. She quit smokeless tobacco  "use about 10 years ago. She reports that she does not drink alcohol and does not use drugs.     FAMILY HISTORY     Reviewed, and family history includes Diabetes in her mother; Hepatitis in her maternal grandmother; Hyperlipidemia in her mother; Hyperparathyroidism in her daughter; Hypertension in her brother, maternal grandmother, and mother; IgA nephropathy in her brother and daughter; Liver Disease in her brother; No Known Problems in her father, sister, sister, sister, sister, and son; Uterine Cancer in her mother.     ALLERGIES     Allergies   Allergen Reactions     Dust Mite Extract Hives     Grass Extracts [Gramineae Pollens] Hives     Shellfish Allergy Hives         REVIEW OF SYSTEMS     A 12 point review of system was performed and was negative except as outlined in the history of present illness.     HOME MEDICATIONS     Current Outpatient Rx   Medication Sig Dispense Refill     ferrous gluconate (FERGON) 324 (38 Fe) MG tablet Take iron pill once daily with food for 7 days/month during her period 21 tablet 4     levothyroxine (SYNTHROID/LEVOTHROID) 75 MCG tablet Take 1 tablet (75 mcg) by mouth daily. 90 tablet 3     lisinopril (ZESTRIL) 5 MG tablet Take 5 mg by mouth daily.       oxyCODONE (ROXICODONE) 5 MG tablet Take 1 tablet (5 mg) by mouth every 6 hours as needed for moderate to severe pain. 12 tablet 0     tranexamic acid (LYSTEDA) 650 MG tablet 2 tabs twice daily for up to 5 days/month during menstrual bleeding. 20 tablet 11         PHYSICAL EXAM     Vital signs  BP (!) 146/97 (BP Location: Right arm, Patient Position: Sitting)   Pulse 67   Ht 1.499 m (4' 11\")   Wt 94.3 kg (208 lb)   LMP 12/28/2024   BMI 42.01 kg/m      Weight:   208 lbs 0 oz    Patient is alert and oriented x4 in no acute distress. Vital signs were reviewed and are documented in electronic medical record. Neck was supple, no carotid bruits, thyromegaly, JVD, or lymphadenopathy was noted.   NEUROLOGY EXAM:    Patient s speech " was normal with no aphasia or dysarthria. Mentation, and affect were also normal.     Funduscopic exam was normal, with normal cup to disc ratio. Cranial nerves II -XII were intact.     Patient had normal mass, tone and motor strength was 5/5 in all extremities without pronator drift.     Sensation was intact to light touch, pinprick, and vibratory sensation.     Reflexes were 1+ symmetrical with downgoing toes.     No dysmetria noted on FNF or HKS. Romberg was negative.    Gait testing was normal. Able to walk on toes/heels. Tandem walk normal.     PERTINENT DIAGNOSTIC STUDIES     Following studies were reviewed:     CT SOFT TISSUE NECK 10/24/2024  1. Right parotid gland solid mass measuring 1.5 x 1.9 x 1.6 cm,  grossly stable compared to the ultrasound completed on 7/15/2024 given  differences in technique. Mass is located just deep to the traversing  right external jugular vein.  2. No additional masses or suspicious cervical lymphadenopathy.  3. 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    MRI BRAIN 1/30/2025  1.  No epileptogenic focus identified.  2.  No acute intracranial process.  3.  Scattered supratentorial FLAIR hyperintensities, greater than expected for age and favored to reflect mild sequela of chronic microvascular ischemia.  4.  Postsurgical changes of the right carotid gland. Small rim-enhancing fluid collections along the posterior aspect of the parotid gland, favored to reflect seromas, although sterility of the collections is not assessed on imaging.    EEG 2/4/2025  This is a normal awake and drowsy EEG.  Please note that the absence of epileptiform abnormalities on EEG does not rule out the possibility of seizures.    EMG 1/24/2025  --There is no electrodiagnostic evidence of polyneuropathy or myopathy.     --There is no electrodiagnostic evidence of focal mononeuropathy in the left upper and left lower extremities.     PERTINENT  LABS  Following labs were reviewed:  Hospital Outpatient Visit on 01/14/2025   Component Date Value Ref Range Status     HCG Qual Urine 01/14/2025 Negative  Negative Final     Internal QC Check POCT 01/14/2025 Valid  Valid Final     POCT Kit Lot Number 01/14/2025 [id number]   Final     POCT Kit Expiration Date 01/14/2025 2026-05-04   Final     Case Report 01/14/2025    Final                    Value:Surgical Pathology Report                         Case: [id number]                                  Authorizing Provider:  Iris Max MD     Collected:           01/14/2025 12:02 PM          Ordering Location:     [facility] OR  Received:            01/14/2025 12:09 PM                                 [place]                                                                  Pathologist:           Julieta Echavarria MD                                                   Intraop:               Shea Cazares MD                                                   Specimens:   A) - Parotid Gland, Right, Right Deep Lobe Parotid Mass                                             B) - Parotid Gland, Right, Superficial Parotid                                              Final Diagnosis 01/14/2025    Final                    Value:A.  PAROTID GLAND, RIGHT DEEP LOBE MASS, EXCISION:  -Basal cell adenoma, 1.8 cm in greatest dimension.  -Margins are negative.  -Background salivary gland with no histopathologic abnormality.  -Three benign lymph nodes.  -No evidence of malignancy identified.    B.  PAROTID GLAND, SUPERFICIAL, EXCISION:  -Benign salivary gland.       Clinical Information 01/14/2025    Final                    Value:Procedure:  EXCISION, PAROTID GLAND - Right  Pre-op Diagnosis: Parotid mass [K11.8]  Post-op Diagnosis: K11.8 - Parotid mass [ICD-10-CM]       Intraoperative Consultation 01/14/2025    Final                    Value:A(1). Parotid Gland, Right, Right Deep Lobe Parotid  "Mass:  AFS1:  -Negative for malignancy (favor basal cell adenoma (versus pleomorphic adenoma))    Shea Cazares MD on 1/14/2025 at 12:38 PM  Intra op performed at:[facility] - CORE LAB, [facility], [address] [place] [address], 1st Floor Lab, Core Lab, [facility] [address]  Intra-op Dx verbally delivered to Dr. Max       Gross Description 01/14/2025    Final                    Value:A(1). Parotid Gland, Right, Right Deep Lobe Parotid Mass:  The specimen is received fresh for intraoperative consultation, with proper patient identification, labeled \"right deep lobe parotid mass\".  It consists of an intact, unoriented 8.3 g, 4.1 x 3.1 x 2.3 cm deep parotid gland without accompanying structures. The superficial external surface is remarkable for tan-pink, fascia. The deep resection margin is gray-tan, smooth, cauterized, and unremarkable.     Ink code: superficial fascial plane-blue, deep cauterized resection margin-black.    On serial sectioning, there is a 1.8 x 1.7 x 1.6 cm single well-demarcated, encapsulated, solid, tan-pink to white, lobular mass that abuts the inked black and deep margin and comes to within 0.2 cm of the overlying inked blue fascia. The mass does appear to abuts the capsule on the deep aspect. The remaining parenchyma is gray-tan to yellow and glandular.     On palpation of the periglandular adipose tissue there are two 0.3 x 0.2 x 0.1 cm and                           0.6 x 0.4 x 0.3 cm tan-red lymph node candidates identified.  No nerve is identified.  A representative section is submitted for frozen section analysis, now submitted as A1 FS, with additional representative sections are submitted as follows:  A1FS.   Mass to deep aspect, frozen section remnant, perpendicular, representative  A2.   Mass abutting one end at the deep aspect, perpendicular  A3.   Mass, greatest effacement, abutting deep aspect and closest overlying fascia, perpendicular  A4.   " "Mass, contiguous with frozen section remnant, perpendicular  A5.   Additional mass to uninvolved parotid parenchyma to include 1 periglandular lymph node candidate  A6. Uninvolved parotid parenchyma to include 1 periglandular lymph node candidate  B(2). Parotid Gland, Right, Superficial Parotid:  The specimen is received in formalin with proper patient identification, labeled \"right parotid, superficial\".  The specimen consists of a cauterized, unoriented, 2.6 g, 3.1 x 1.5 x 1.8 cm superficial                           parotid gland, without accompanying structures.  The external surfaces remarkable for a minimal amount of red-brown, skeletal muscle while the opposing deep resection margin is remarkable for cauterized parotid parenchyma.    Ink code: external surface with skeletal muscle-blue, deep cauterized resection margin-black.     On serial sectioning, the salivary gland parenchyma is gray-tan to yellow, granular, and unremarkable.  No lymph nodes are grossly appreciated.  No masses are identified.  Representative sections are submitted as B1-2.       Microscopic Description 01/14/2025    Final                    Value:Microscopic examination has been performed.  Representative blocks are A1 and A3.    I have personally reviewed all specimens and or slides, including the listed special stains, and used them with my medical judgement to determine the final diagnosis.       Performing Labs 01/14/2025    Final                    Value:The technical component of this testing was completed at [facility].    Stain controls for all stains resulted within this report have been reviewed and show appropriate reactivity.      Office Visit on 01/06/2025   Component Date Value Ref Range Status     Human Papilloma Virus 16 DNA 01/06/2025 Negative  Negative Final     Human Papilloma Virus 18 DNA 01/06/2025 Negative  Negative Final     Human Papilloma Virus Other " 01/06/2025 Negative  Negative Final     FINAL DIAGNOSIS 01/06/2025    Final                    Value:This patient's sample is negative for high risk HPV DNA.          METHODOLOGY: The [employer] system uses automated extraction, simultaneous amplification of HPV (E6/E7 oncogenes) and beta-globin, followed by real time detection of fluorescent labeled HPV and beta globin using specific oligonucleotide probes. The test specifically identifies types HPV 16 DNA and HPV 18 DNA while concurrently detecting the rest of the high risk types (31, 33, 35, 39, 45, 51, 52, 56, 58, 59, 66 or 68).     COMMENTS: This test is not intended for use as a screening device for woman under age 30 with normal cervical cytology. Results should be correlated with cytologic and histologic findings. Close clinical follow up is recommended.    Please see the separate Gynecologic Cytology (Pap) report from the same collection date.       Ventricular Rate 01/06/2025 69  BPM Final     Atrial Rate 01/06/2025 69  BPM Final     AZ Interval 01/06/2025 156  ms Final     QRS Duration 01/06/2025 84  ms Final     QT 01/06/2025 434  ms Final     QTc 01/06/2025 465  ms Final     P Axis 01/06/2025 23  degrees Final     R AXIS 01/06/2025 16  degrees Final     T Axis 01/06/2025 -11  degrees Final     Interpretation ECG 01/06/2025    Final                    Value:Sinus rhythm  Normal ECG  When compared with ECG of 24-Feb-2020 10:06,  No significant change was found  Confirmed by CONOR ARCOS, LES LOC:JN ([id number]) on 1/6/2025 10:54:06 AM       WBC Count 01/06/2025 6.3  4.0 - 11.0 10e3/uL Final     RBC Count 01/06/2025 4.42  3.80 - 5.20 10e6/uL Final     Hemoglobin 01/06/2025 12.4  11.7 - 15.7 g/dL Final     Hematocrit 01/06/2025 39.3  35.0 - 47.0 % Final     MCV 01/06/2025 89  78 - 100 fL Final     MCH 01/06/2025 28.1  26.5 - 33.0 pg Final     MCHC 01/06/2025 31.6  31.5 - 36.5 g/dL Final     RDW 01/06/2025 14.0  10.0 - 15.0 % Final     Platelet Count 01/06/2025 184   150 - 450 10e3/uL Final     Ferritin 01/06/2025 40  6 - 175 ng/mL Final     hCG Urine Qualitative 01/06/2025 Negative  Negative Final     Sodium 01/06/2025 137  135 - 145 mmol/L Final     Potassium 01/06/2025 3.9  3.4 - 5.3 mmol/L Final     Carbon Dioxide (CO2) 01/06/2025 24  22 - 29 mmol/L Final     Anion Gap 01/06/2025 11  7 - 15 mmol/L Final     Urea Nitrogen 01/06/2025 15.8  6.0 - 20.0 mg/dL Final     Creatinine 01/06/2025 0.93  0.51 - 0.95 mg/dL Final     GFR Estimate 01/06/2025 78  >60 mL/min/1.73m2 Final     Calcium 01/06/2025 9.1  8.8 - 10.4 mg/dL Final     Chloride 01/06/2025 102  98 - 107 mmol/L Final     Glucose 01/06/2025 89  70 - 99 mg/dL Final     Alkaline Phosphatase 01/06/2025 72  40 - 150 U/L Final     AST 01/06/2025 22  0 - 45 U/L Final     ALT 01/06/2025 11  0 - 50 U/L Final     Protein Total 01/06/2025 7.3  6.4 - 8.3 g/dL Final     Albumin 01/06/2025 4.1  3.5 - 5.2 g/dL Final     Bilirubin Total 01/06/2025 0.6  <=1.2 mg/dL Final     Vitamin D, Total (25-Hydroxy) 01/06/2025 29  20 - 50 ng/mL Final     Interpretation 01/06/2025 Negative for Intraepithelial Lesion or Malignancy (NILM)    Final     Comment 01/06/2025    Final                    Value:  Papanicolaou Test Limitations:  Cervical cytology is a screening test with limited sensitivity, and regular screening is critical for cancer prevention.  Pap tests are primarily effective for the diagnosis/prevention of squamous cell carcinoma, not adenocarcinoma or other cancers.         Specimen Adequacy 01/06/2025 Satisfactory for evaluation, endocervical/transformation zone component present   Final     Clinical Information 01/06/2025    Final                    Value:none       Previous Abnormal? 01/06/2025    Final                    Value:No       Performing Labs 01/06/2025    Final                    Value:The technical component of this testing was completed at [facility].    Stain  controls for all stains resulted within this report have been reviewed and show appropriate reactivity.       Associated HPV Report 01/06/2025    Final                    Value:Please see the associated HPV High Risk Types DNA Cervical report for Specimen [id number] from the same collection date.     Admission on 11/21/2024, Discharged on 11/21/2024   Component Date Value Ref Range Status     INR 11/21/2024 0.94  0.85 - 1.15 Final     Final Diagnosis 11/21/2024    Final                    Value:Specimen A     Interpretation:      Nondiagnostic     Adequacy:     Unsatisfactory for evaluation, Scant cellularity           Comment 11/21/2024    Final                    Value:There is insufficient tissue present for evaluation. Clinical correlation recommended with consideration of additional tissue sampling if clinically warranted.        Clinical Information 11/21/2024    Final                    Value:Right parotid mass.        Rapid Onsite Evaluation 11/21/2024    Final                    Value:FNA Performance:   Fine needle aspiration was not performed by [facility] staff.    Aspirate immediate study/adequacy:  I, LISA, CARLOS THOMASON MD, attest that I immediately examined smears while the procedure was underway and determined or confirmed the adequacy of the specimens via telepathology.    It is of note that the final assessment and report may be performed and signed by a different pathologist.    Onsite adequacy/interpretation:  A: Inadequate\       Gross Description 11/21/2024    Final                    Value:A. Parotid Gland, Right, Fine Needle Aspirate:  Received are 3 fixed slides, processed for Pap stain, 3 air dried slides, processed for Diff Quik stain, and material in formalin, processed for one hematoxylin stained cell block.       Microscopic Description 11/21/2024    Final                    Value:A microscopic examination was performed.     Case was reviewed by the  following:  Resident Pathologist: Lenin Bryant MD  Pathology Fellow: Denice Kay MD  Pathology Fellow: Alicia Rivera DO  A resident or fellow in a training program was involved in the initial review, preparation, and/or interpretation of this case.  I, as the senior physician, attest that I have personally reviewed all specimens and or slides, including the listed special stains, and used them with my medical judgement to determine the final diagnosis.               Performing Labs 11/21/2024    Final                    Value:The technical component of this testing was completed at [facility].     Stain controls for all stains resulted within this report have been reviewed and show appropriate reactivity.            Total time spent for face to face visit, reviewing labs/imaging studies, counseling and coordination of care was: 20 minutes spent on the date of the encounter doing chart review, review of outside records, review of test results, interpretation of tests, patient visit, and documentation     The longitudinal plan of care for the diagnosis(es)/condition(s) as documented were addressed during this visit. Due to the added complexity in care, I will continue to support Anish in the subsequent management and with ongoing continuity of care.    This note was dictated using voice recognition software.  Any grammatical or context distortions are unintentional and inherent to the software.    No orders of the defined types were placed in this encounter.     New Prescriptions    No medications on file     Modified Medications    No medications on file                 Again, thank you for allowing me to participate in the care of your patient.        Sincerely,        Juan Carlos Castillo MD    Electronically signed

## 2025-03-28 ENCOUNTER — OFFICE VISIT (OUTPATIENT)
Dept: FAMILY MEDICINE | Facility: CLINIC | Age: 45
End: 2025-03-28
Attending: STUDENT IN AN ORGANIZED HEALTH CARE EDUCATION/TRAINING PROGRAM
Payer: COMMERCIAL

## 2025-03-28 VITALS
DIASTOLIC BLOOD PRESSURE: 86 MMHG | HEART RATE: 83 BPM | RESPIRATION RATE: 16 BRPM | HEIGHT: 59 IN | BODY MASS INDEX: 42.33 KG/M2 | WEIGHT: 210 LBS | SYSTOLIC BLOOD PRESSURE: 125 MMHG | OXYGEN SATURATION: 97 % | TEMPERATURE: 97.2 F

## 2025-03-28 DIAGNOSIS — E66.01 CLASS 3 SEVERE OBESITY DUE TO EXCESS CALORIES WITH SERIOUS COMORBIDITY AND BODY MASS INDEX (BMI) OF 40.0 TO 44.9 IN ADULT (H): ICD-10-CM

## 2025-03-28 DIAGNOSIS — R73.03 PREDIABETES: ICD-10-CM

## 2025-03-28 DIAGNOSIS — Z00.00 ROUTINE GENERAL MEDICAL EXAMINATION AT A HEALTH CARE FACILITY: ICD-10-CM

## 2025-03-28 DIAGNOSIS — I10 PRIMARY HYPERTENSION: ICD-10-CM

## 2025-03-28 DIAGNOSIS — Z12.31 ENCOUNTER FOR SCREENING MAMMOGRAM FOR MALIGNANT NEOPLASM OF BREAST: ICD-10-CM

## 2025-03-28 DIAGNOSIS — E66.813 CLASS 3 SEVERE OBESITY DUE TO EXCESS CALORIES WITH SERIOUS COMORBIDITY AND BODY MASS INDEX (BMI) OF 40.0 TO 44.9 IN ADULT (H): ICD-10-CM

## 2025-03-28 DIAGNOSIS — N92.0 MENORRHAGIA WITH REGULAR CYCLE: ICD-10-CM

## 2025-03-28 DIAGNOSIS — E06.3 HYPOTHYROIDISM DUE TO HASHIMOTO'S THYROIDITIS: Primary | ICD-10-CM

## 2025-03-28 DIAGNOSIS — E55.9 VITAMIN D DEFICIENCY: ICD-10-CM

## 2025-03-28 DIAGNOSIS — D11.0 PLEOMORPHIC ADENOMA OF PAROTID GLAND: ICD-10-CM

## 2025-03-28 DIAGNOSIS — D50.0 IRON DEFICIENCY ANEMIA DUE TO CHRONIC BLOOD LOSS: ICD-10-CM

## 2025-03-28 PROBLEM — K11.8 PAROTID MASS: Status: RESOLVED | Noted: 2024-11-23 | Resolved: 2025-03-28

## 2025-03-28 LAB
ABO + RH BLD: NORMAL
ALBUMIN SERPL BCG-MCNC: 3.8 G/DL (ref 3.5–5.2)
ALP SERPL-CCNC: 75 U/L (ref 40–150)
ALT SERPL W P-5'-P-CCNC: 11 U/L (ref 0–50)
ANION GAP SERPL CALCULATED.3IONS-SCNC: 11 MMOL/L (ref 7–15)
AST SERPL W P-5'-P-CCNC: 18 U/L (ref 0–45)
BILIRUB SERPL-MCNC: 0.4 MG/DL
BUN SERPL-MCNC: 12.2 MG/DL (ref 6–20)
CALCIUM SERPL-MCNC: 8.8 MG/DL (ref 8.8–10.4)
CHLORIDE SERPL-SCNC: 104 MMOL/L (ref 98–107)
CREAT SERPL-MCNC: 1.04 MG/DL (ref 0.51–0.95)
CREAT UR-MCNC: 241 MG/DL
EGFRCR SERPLBLD CKD-EPI 2021: 68 ML/MIN/1.73M2
ERYTHROCYTE [DISTWIDTH] IN BLOOD BY AUTOMATED COUNT: 13.3 % (ref 10–15)
EST. AVERAGE GLUCOSE BLD GHB EST-MCNC: 120 MG/DL
GLUCOSE SERPL-MCNC: 90 MG/DL (ref 70–99)
HBA1C MFR BLD: 5.8 % (ref 0–5.6)
HCO3 SERPL-SCNC: 23 MMOL/L (ref 22–29)
HCT VFR BLD AUTO: 36.7 % (ref 35–47)
HGB BLD-MCNC: 11.7 G/DL (ref 11.7–15.7)
MCH RBC QN AUTO: 27.6 PG (ref 26.5–33)
MCHC RBC AUTO-ENTMCNC: 31.9 G/DL (ref 31.5–36.5)
MCV RBC AUTO: 87 FL (ref 78–100)
MICROALBUMIN UR-MCNC: 20.5 MG/L
MICROALBUMIN/CREAT UR: 8.51 MG/G CR (ref 0–25)
PLATELET # BLD AUTO: 233 10E3/UL (ref 150–450)
POTASSIUM SERPL-SCNC: 3.8 MMOL/L (ref 3.4–5.3)
PROT SERPL-MCNC: 7 G/DL (ref 6.4–8.3)
RBC # BLD AUTO: 4.24 10E6/UL (ref 3.8–5.2)
SODIUM SERPL-SCNC: 138 MMOL/L (ref 135–145)
SPECIMEN EXP DATE BLD: NORMAL
T4 FREE SERPL-MCNC: 0.99 NG/DL (ref 0.9–1.7)
TSH SERPL DL<=0.005 MIU/L-ACNC: 2 UIU/ML (ref 0.3–4.2)
WBC # BLD AUTO: 7.2 10E3/UL (ref 4–11)

## 2025-03-28 PROCEDURE — 86901 BLOOD TYPING SEROLOGIC RH(D): CPT | Performed by: FAMILY MEDICINE

## 2025-03-28 PROCEDURE — 90471 IMMUNIZATION ADMIN: CPT | Performed by: FAMILY MEDICINE

## 2025-03-28 PROCEDURE — 86900 BLOOD TYPING SEROLOGIC ABO: CPT | Performed by: FAMILY MEDICINE

## 2025-03-28 PROCEDURE — 3074F SYST BP LT 130 MM HG: CPT | Performed by: FAMILY MEDICINE

## 2025-03-28 PROCEDURE — 82570 ASSAY OF URINE CREATININE: CPT | Performed by: FAMILY MEDICINE

## 2025-03-28 PROCEDURE — 82043 UR ALBUMIN QUANTITATIVE: CPT | Performed by: FAMILY MEDICINE

## 2025-03-28 PROCEDURE — 85027 COMPLETE CBC AUTOMATED: CPT | Performed by: FAMILY MEDICINE

## 2025-03-28 PROCEDURE — 83036 HEMOGLOBIN GLYCOSYLATED A1C: CPT | Performed by: FAMILY MEDICINE

## 2025-03-28 PROCEDURE — 3079F DIAST BP 80-89 MM HG: CPT | Performed by: FAMILY MEDICINE

## 2025-03-28 PROCEDURE — 36415 COLL VENOUS BLD VENIPUNCTURE: CPT | Performed by: FAMILY MEDICINE

## 2025-03-28 PROCEDURE — 84443 ASSAY THYROID STIM HORMONE: CPT | Performed by: FAMILY MEDICINE

## 2025-03-28 PROCEDURE — 84439 ASSAY OF FREE THYROXINE: CPT | Performed by: FAMILY MEDICINE

## 2025-03-28 PROCEDURE — 80053 COMPREHEN METABOLIC PANEL: CPT | Performed by: FAMILY MEDICINE

## 2025-03-28 PROCEDURE — 90715 TDAP VACCINE 7 YRS/> IM: CPT | Performed by: FAMILY MEDICINE

## 2025-03-28 PROCEDURE — 99214 OFFICE O/P EST MOD 30 MIN: CPT | Mod: 25 | Performed by: FAMILY MEDICINE

## 2025-03-28 PROCEDURE — 99396 PREV VISIT EST AGE 40-64: CPT | Mod: 25 | Performed by: FAMILY MEDICINE

## 2025-03-28 RX ORDER — LISINOPRIL 5 MG/1
5 TABLET ORAL DAILY
Qty: 90 TABLET | Refills: 3 | Status: SHIPPED | OUTPATIENT
Start: 2025-03-28

## 2025-03-28 SDOH — HEALTH STABILITY: PHYSICAL HEALTH: ON AVERAGE, HOW MANY DAYS PER WEEK DO YOU ENGAGE IN MODERATE TO STRENUOUS EXERCISE (LIKE A BRISK WALK)?: 3 DAYS

## 2025-03-28 SDOH — HEALTH STABILITY: PHYSICAL HEALTH: ON AVERAGE, HOW MANY MINUTES DO YOU ENGAGE IN EXERCISE AT THIS LEVEL?: 50 MIN

## 2025-03-28 ASSESSMENT — SOCIAL DETERMINANTS OF HEALTH (SDOH): HOW OFTEN DO YOU GET TOGETHER WITH FRIENDS OR RELATIVES?: ONCE A WEEK

## 2025-03-28 NOTE — PATIENT INSTRUCTIONS
Patient Education   Preventive Care Advice   This is general advice given by our system to help you stay healthy. However, your care team may have specific advice just for you. Please talk to your care team about your preventive care needs.  Nutrition  Eat 5 or more servings of fruits and vegetables each day.  Try wheat bread, brown rice and whole grain pasta (instead of white bread, rice, and pasta).  Get enough calcium and vitamin D. Check the label on foods and aim for 100% of the RDA (recommended daily allowance).  Lifestyle  Exercise at least 150 minutes each week  (30 minutes a day, 5 days a week).  Do muscle strengthening activities 2 days a week. These help control your weight and prevent disease.  No smoking.  Wear sunscreen to prevent skin cancer.  Have a dental exam and cleaning every 6 months.  Yearly exams  See your health care team every year to talk about:  Any changes in your health.  Any medicines your care team has prescribed.  Preventive care, family planning, and ways to prevent chronic diseases.  Shots (vaccines)   HPV shots (up to age 26), if you've never had them before.  Hepatitis B shots (up to age 59), if you've never had them before.  COVID-19 shot: Get this shot when it's due.  Flu shot: Get a flu shot every year.  Tetanus shot: Get a tetanus shot every 10 years.  Pneumococcal, hepatitis A, and RSV shots: Ask your care team if you need these based on your risk.  Shingles shot (for age 50 and up)  General health tests  Diabetes screening:  Starting at age 35, Get screened for diabetes at least every 3 years.  If you are younger than age 35, ask your care team if you should be screened for diabetes.  Cholesterol test: At age 39, start having a cholesterol test every 5 years, or more often if advised.  Bone density scan (DEXA): At age 50, ask your care team if you should have this scan for osteoporosis (brittle bones).  Hepatitis C: Get tested at least once in your life.  STIs (sexually  transmitted infections)  Before age 24: Ask your care team if you should be screened for STIs.  After age 24: Get screened for STIs if you're at risk. You are at risk for STIs (including HIV) if:  You are sexually active with more than one person.  You don't use condoms every time.  You or a partner was diagnosed with a sexually transmitted infection.  If you are at risk for HIV, ask about PrEP medicine to prevent HIV.  Get tested for HIV at least once in your life, whether you are at risk for HIV or not.  Cancer screening tests  Cervical cancer screening: If you have a cervix, begin getting regular cervical cancer screening tests starting at age 21.  Breast cancer scan (mammogram): If you've ever had breasts, begin having regular mammograms starting at age 40. This is a scan to check for breast cancer.  Colon cancer screening: It is important to start screening for colon cancer at age 45.  Have a colonoscopy test every 10 years (or more often if you're at risk) Or, ask your provider about stool tests like a FIT test every year or Cologuard test every 3 years.  To learn more about your testing options, visit:   .  For help making a decision, visit:   https://bit.ly/hf27008.  Prostate cancer screening test: If you have a prostate, ask your care team if a prostate cancer screening test (PSA) at age 55 is right for you.  Lung cancer screening: If you are a current or former smoker ages 50 to 80, ask your care team if ongoing lung cancer screenings are right for you.  For informational purposes only. Not to replace the advice of your health care provider. Copyright   2023 [employer]. All rights reserved. Clinically reviewed by the [facility]. [employer] [id number] - REV 01/24.

## 2025-03-28 NOTE — PROGRESS NOTES
Preventive Care Visit  [facility]  Bibi Colunga MD, Family Medicine  Mar 28, 2025      Assessment & Plan     Hypothyroidism due to Hashimoto's thyroiditis  Well controlled.  Asymptomatic.  Continue on 75mcg daily.    - TSH  - T4 free  - levothyroxine (SYNTHROID/LEVOTHROID) 75 MCG tablet  Dispense: 90 tablet; Refill: 3    Iron deficiency anemia due to chronic blood loss  Hgb stable.  Continue with iron daily during menstrual flow.      Menorrhagia with regular cycle  As above.  No anemia.  Has TXA but notes last few periods not as strong.      Pleomorphic adenoma of parotid gland  Updated problem list- will add to PMH.      Prediabetes  Reviewed LSM.      Primary hypertension  BP Readings from Last 3 Encounters:   03/28/25 125/86   02/04/25 (!) 146/97   01/14/25 110/85        controlled today.  Plan for bloodpressure management includes ongoing focus on healthy DASH type diet and increased activity, encouraged to avoid tobacco products and limit alcohol use, stress reduction, encourage pt to take 5mg daily with borderline elevated DBP and family history of renal disease.  Labwork and meds ordered and reviewed as below  Potassium   Date Value Ref Range Status   03/28/2025 3.8 3.4 - 5.3 mmol/L Final   02/11/2022 4.0 3.5 - 5.0 mmol/L Final   03/08/2013 4.0 3.5 - 5.0 mmol/L Final      Creatinine   Date Value Ref Range Status   03/28/2025 1.04 (H) 0.51 - 0.95 mg/dL Final   03/08/2013 0.81 0.60 - 1.10 mg/dL Final        - Albumin Random Urine Quantitative with Creat Ratio  - lisinopril (ZESTRIL) 5 MG tablet  Dispense: 90 tablet; Refill: 3  - Albumin Random Urine Quantitative with Creat Ratio    Vitamin D deficiency  Last checked 1 2025- should be on daily supplement.      Routine general medical examination at a health care facility  - TDAP 10-64Y (ADACEL,BOOSTRIX)  - TSH with free T4 reflex  - CBC with platelets  - Comprehensive metabolic panel (BMP + Alb, Alk Phos, ALT, AST, Total. Bili, TP)  -  Hemoglobin A1c  - PRIMARY CARE FOLLOW-UP SCHEDULING  - MA Screen Bilateral w/Massimo  - ABO and Rh  - CBC with platelets  - Comprehensive metabolic panel (BMP + Alb, Alk Phos, ALT, AST, Total. Bili, TP)  - Hemoglobin A1c  - ABO and Rh    Encounter for screening mammogram for malignant neoplasm of breast  - MA Screen Bilateral w/Massimo    Class 3 severe obesity due to excess calories with serious comorbidity and body mass index (BMI) of 40.0 to 44.9 in adult (H)  Reviewed LSM.        Patient has been advised of split billing requirements and indicates understanding: Yes        Counseling  Appropriate preventive services were addressed with this patient via screening, questionnaire, or discussion as appropriate for fall prevention, nutrition, physical activity, Tobacco-use cessation, social engagement, weight loss and cognition.  Checklist reviewing preventive services available has been given to the patient.  Reviewed patient's diet, addressing concerns and/or questions.   She is at risk for lack of exercise and has been provided with information to increase physical activity for the benefit of her well-being.         Loida Oconnell is a 45 year old, presenting for the following:  Physical (Pt had lump removed in January)        3/28/2025     1:31 PM   Additional Questions   Roomed by Susanne   Accompanied by self          HPI  Experiences 10 second bursts of muscle twitches localized to small parts of the body every 2-3 days. This is improved from onset of symptoms 1 year ago when she experiences several episodes daily that would last several minutes. When this happens in her head, it causes her a lot of anxiety because she worries that it may be due to something serious. She denies any pain associated with these episodes and has not noticed a link between stress and more episodes.     No concerns with neck following surgery on 1/14.     Checking bp daily - if DBP is over 90 she will take her lisinopril.     Hasn't  taken TXA yet.  Periods are heavy for 1st 2 days only.   Taking iron during her periods.      Declines covid booster    Daughter may need renal transplant.  Wants to check her blood type.   Last baby born 2005     Advance Care Planning  Patient does not have a Health Care Directive: Discussed advance care planning with patient; information given to patient to review.      3/28/2025   General Health   How would you rate your overall physical health? (!) POOR   Feel stress (tense, anxious, or unable to sleep) Only a little   (!) STRESS CONCERN      3/28/2025   Nutrition   Three or more servings of calcium each day? Yes   Diet: Regular (no restrictions)    Low salt   How many servings of fruit and vegetables per day? (!) 0-1   How many sweetened beverages each day? 0-1       Multiple values from one day are sorted in reverse-chronological order         3/28/2025   Exercise   Days per week of moderate/strenous exercise 3 days   Average minutes spent exercising at this level 50 min         3/28/2025   Social Factors   Frequency of gathering with friends or relatives Once a week   Worry food won't last until get money to buy more No   Food not last or not have enough money for food? No   Do you have housing? (Housing is defined as stable permanent housing and does not include staying ouside in a car, in a tent, in an abandoned building, in an overnight shelter, or couch-surfing.) No   Are you worried about losing your housing? No   Lack of transportation? No   Unable to get utilities (heat,electricity)? No   Want help with housing or utility concern? No   (!) HOUSING CONCERN PRESENT      3/28/2025   Dental   Dentist two times every year? Yes           2/14/2024   TB Screening   Were you born outside of the [place]? No             Today's PHQ-2 Score:       1/6/2025     7:10 AM   PHQ-2 ( 1999 [employer])   Q1: Little interest or pleasure in doing things 0   Q2: Feeling down, depressed or hopeless 0   PHQ-2 Score 0    Q1: Little  interest or pleasure in doing things Not at all   Q2: Feeling down, depressed or hopeless Not at all   PHQ-2 Score 0       Patient-reported         3/28/2025   Substance Use   Alcohol more than 3/day or more than 7/wk Not Applicable   Do you use any other substances recreationally? No     Social History     Tobacco Use    Smoking status: Former     Current packs/day: 0.00     Types: Cigarettes     Start date: 1994     Quit date: 2009     Years since quittin.2     Passive exposure: Never    Smokeless tobacco: Former     Quit date: 2008    Tobacco comments:     No exposure to second hand smoking.   Vaping Use    Vaping status: Never Used   Substance Use Topics    Alcohol use: No    Drug use: No           2024   LAST FHS-7 RESULTS   1st degree relative breast or ovarian cancer No   Any relative bilateral breast cancer No   Any male have breast cancer No   Any ONE woman have BOTH breast AND ovarian cancer No   Any woman with breast cancer before 50yrs No   2 or more relatives with breast AND/OR ovarian cancer No   2 or more relatives with breast AND/OR bowel cancer No        Mammogram Screening - Mammogram every 1-2 years updated in Health Maintenance based on mutual decision making        3/28/2025   STI Screening   New sexual partner(s) since last STI/HIV test? No     History of abnormal Pap smear: No - age 30- 64 PAP with HPV every 5 years recommended        Latest Ref Rng & Units 2025     9:45 AM 2020    10:02 AM 3/27/2015    10:40 AM   PAP / HPV   PAP  Negative for Intraepithelial Lesion or Malignancy (NILM)  Negative for squamous intraepithelial lesion or malignancy  Electronically signed by Vanessa Campbell CT (ASCP) on 3/2/2020 at 10:33 AM    Negative for squamous intraepithelial lesion or malignancy  Electronically signed by Gabbie Mckeon CT (ASCP) on 2015 at  2:15 PM      HPV 16 DNA Negative Negative  Negative     HPV 18 DNA Negative Negative  Negative     Other HR  "HPV Negative Negative  Negative       ASCVD Risk   The 10-year ASCVD risk score (Elise FOSS, et al., 2019) is: 1.2%    Values used to calculate the score:      Age: 45 years      Sex: Female      Is Non- : No      Diabetic: No      Tobacco smoker: No      Systolic Blood Pressure: 125 mmHg      Is BP treated: Yes      HDL Cholesterol: 43 mg/dL      Total Cholesterol: 181 mg/dL        3/28/2025   Contraception/Family Planning   Questions about contraception or family planning No        Reviewed and updated as needed this visit by Provider   Tobacco  Allergies  Meds  Problems  Med Hx  Surg Hx  Fam Hx               Objective    Exam  /86 (BP Location: Left arm, Patient Position: Sitting, Cuff Size: Adult Regular)   Pulse 83   Temp 97.2  F (36.2  C) (Temporal)   Resp 16   Ht 1.51 m (4' 11.45\")   Wt 95.3 kg (210 lb)   LMP 03/02/2025 (Exact Date)   SpO2 97%   BMI 41.78 kg/m     Estimated body mass index is 41.78 kg/m  as calculated from the following:    Height as of this encounter: 1.51 m (4' 11.45\").    Weight as of this encounter: 95.3 kg (210 lb).    Physical Exam  Complete 10 point ROS completed today as part of the exam and patient denies any symptoms as reviewed in HPI     Wt Readings from Last 3 Encounters:   03/28/25 95.3 kg (210 lb)   02/04/25 94.3 kg (208 lb)   01/22/25 94.3 kg (208 lb)       Patient's last menstrual period was 03/02/2025 (exact date).    All normal as below except abnormalities include: grossly normal exam   General is a  45 year old sitting comfortably in no apparent distress   HEENT:  TM are clear bilaterally.  Eye exams within normal   Neck: Supple without lymphadenopathy or thyromegally  CV: Regular rate and rhythm S1S2 without rubs, murmurs or gallops,   Lungs: Clear to auscultation bilaterally  Abd:  +BS, soft NT/ND,  No masses or organomegally,   Extremities: Warm, No Edema, 2+ Pedal and radial pulses bilaterally  Skin: No lesions or " rashes noted  Neuro: Able to ambulate around the exam room with equal movement, strength and normal coordination of the upper and lower extremeties symmetrically    History summarized from1-2:Neurology- 2/2025 reviewed myclonic twitches.  MRI showing hyperintensity greater than expected for age.  Normal EMG and EEG.  Consider clonazepam if more symptomatic.    Old Records-1: Outside allergies, meds, problems and immunizations were reconciled as needed from CareEverywhere  Radiology tests reviewed-1: MRI 2025 reviewed:   IMPRESSION:  1.  No epileptogenic focus identified.  2.  No acute intracranial process.  3.  Scattered supratentorial FLAIR hyperintensities, greater than expected for age and favored to reflect mild sequela of chronic microvascular ischemia.  4.  Postsurgical changes of the right carotid gland. Small rim-enhancing fluid collections along the posterior aspect of the parotid gland, favored to reflect seromas, although sterility of the collections is not assessed on imaging.  Lab tests reviewed-1: [id number]      Follow-up Visit   Expected date:  Sep 28, 2025 (Approximate)      Follow Up Appointment Details:     Follow-up with whom?: Me    Follow-Up for what?: Chronic Disease f/u    Chronic Disease f/u: Hypertension    How?: In Person             Follow-up Visit   Expected date:  Mar 28, 2026 (Approximate)      Follow Up Appointment Details:     Follow-up with whom?: PCP    Follow-Up for what?: Adult Preventive    How?: In Person                 Bibi Colunga MD         Signed Electronically by: Bibi Colunga MD

## 2025-04-01 RX ORDER — LEVOTHYROXINE SODIUM 75 UG/1
75 TABLET ORAL DAILY
Qty: 90 TABLET | Refills: 3 | Status: SHIPPED | OUTPATIENT
Start: 2025-04-01

## 2025-04-11 ENCOUNTER — ANCILLARY PROCEDURE (OUTPATIENT)
Dept: MAMMOGRAPHY | Facility: CLINIC | Age: 45
End: 2025-04-11
Payer: COMMERCIAL

## 2025-04-11 DIAGNOSIS — Z00.00 ROUTINE GENERAL MEDICAL EXAMINATION AT A HEALTH CARE FACILITY: ICD-10-CM

## 2025-04-11 DIAGNOSIS — Z12.31 ENCOUNTER FOR SCREENING MAMMOGRAM FOR MALIGNANT NEOPLASM OF BREAST: ICD-10-CM

## 2025-04-11 PROCEDURE — 77063 BREAST TOMOSYNTHESIS BI: CPT | Mod: TC | Performed by: RADIOLOGY

## 2025-04-11 PROCEDURE — 77067 SCR MAMMO BI INCL CAD: CPT | Mod: TC | Performed by: RADIOLOGY

## 2025-08-08 ENCOUNTER — OFFICE VISIT (OUTPATIENT)
Dept: FAMILY MEDICINE | Facility: CLINIC | Age: 45
End: 2025-08-08
Payer: COMMERCIAL

## 2025-08-08 VITALS
OXYGEN SATURATION: 99 % | DIASTOLIC BLOOD PRESSURE: 87 MMHG | TEMPERATURE: 97.1 F | WEIGHT: 214 LBS | HEIGHT: 60 IN | BODY MASS INDEX: 42.01 KG/M2 | HEART RATE: 80 BPM | SYSTOLIC BLOOD PRESSURE: 129 MMHG | RESPIRATION RATE: 20 BRPM

## 2025-08-08 DIAGNOSIS — R10.13 ABDOMINAL PAIN, EPIGASTRIC: Primary | ICD-10-CM

## 2025-08-08 LAB
ALBUMIN SERPL BCG-MCNC: 4 G/DL (ref 3.5–5.2)
ALP SERPL-CCNC: 85 U/L (ref 40–150)
ALT SERPL W P-5'-P-CCNC: 12 U/L (ref 0–50)
ANION GAP SERPL CALCULATED.3IONS-SCNC: 10 MMOL/L (ref 7–15)
AST SERPL W P-5'-P-CCNC: 17 U/L (ref 0–45)
BASOPHILS # BLD AUTO: 0 10E3/UL (ref 0–0.2)
BASOPHILS NFR BLD AUTO: 0 %
BILIRUB SERPL-MCNC: 0.6 MG/DL
BUN SERPL-MCNC: 12.8 MG/DL (ref 6–20)
CALCIUM SERPL-MCNC: 9.8 MG/DL (ref 8.8–10.4)
CHLORIDE SERPL-SCNC: 104 MMOL/L (ref 98–107)
CREAT SERPL-MCNC: 0.96 MG/DL (ref 0.51–0.95)
EGFRCR SERPLBLD CKD-EPI 2021: 74 ML/MIN/1.73M2
EOSINOPHIL # BLD AUTO: 0.4 10E3/UL (ref 0–0.7)
EOSINOPHIL NFR BLD AUTO: 5 %
ERYTHROCYTE [DISTWIDTH] IN BLOOD BY AUTOMATED COUNT: 14.3 % (ref 10–15)
GLUCOSE SERPL-MCNC: 104 MG/DL (ref 70–99)
HCO3 SERPL-SCNC: 24 MMOL/L (ref 22–29)
HCT VFR BLD AUTO: 37.8 % (ref 35–47)
HGB BLD-MCNC: 12.1 G/DL (ref 11.7–15.7)
IMM GRANULOCYTES # BLD: 0 10E3/UL
IMM GRANULOCYTES NFR BLD: 0 %
LIPASE SERPL-CCNC: 50 U/L (ref 13–60)
LYMPHOCYTES # BLD AUTO: 1.8 10E3/UL (ref 0.8–5.3)
LYMPHOCYTES NFR BLD AUTO: 23 %
MCH RBC QN AUTO: 26.9 PG (ref 26.5–33)
MCHC RBC AUTO-ENTMCNC: 32 G/DL (ref 31.5–36.5)
MCV RBC AUTO: 84 FL (ref 78–100)
MONOCYTES # BLD AUTO: 0.5 10E3/UL (ref 0–1.3)
MONOCYTES NFR BLD AUTO: 7 %
NEUTROPHILS # BLD AUTO: 4.8 10E3/UL (ref 1.6–8.3)
NEUTROPHILS NFR BLD AUTO: 65 %
PLATELET # BLD AUTO: 235 10E3/UL (ref 150–450)
POTASSIUM SERPL-SCNC: 4.2 MMOL/L (ref 3.4–5.3)
PROT SERPL-MCNC: 7.7 G/DL (ref 6.4–8.3)
RBC # BLD AUTO: 4.49 10E6/UL (ref 3.8–5.2)
SODIUM SERPL-SCNC: 138 MMOL/L (ref 135–145)
WBC # BLD AUTO: 7.5 10E3/UL (ref 4–11)

## 2025-08-08 PROCEDURE — 83690 ASSAY OF LIPASE: CPT | Performed by: FAMILY MEDICINE

## 2025-08-08 PROCEDURE — 36415 COLL VENOUS BLD VENIPUNCTURE: CPT | Performed by: FAMILY MEDICINE

## 2025-08-08 PROCEDURE — 80053 COMPREHEN METABOLIC PANEL: CPT | Performed by: FAMILY MEDICINE

## 2025-08-08 PROCEDURE — 3079F DIAST BP 80-89 MM HG: CPT | Performed by: FAMILY MEDICINE

## 2025-08-08 PROCEDURE — 3074F SYST BP LT 130 MM HG: CPT | Performed by: FAMILY MEDICINE

## 2025-08-08 PROCEDURE — 1126F AMNT PAIN NOTED NONE PRSNT: CPT | Performed by: FAMILY MEDICINE

## 2025-08-08 PROCEDURE — 99214 OFFICE O/P EST MOD 30 MIN: CPT | Performed by: FAMILY MEDICINE

## 2025-08-08 PROCEDURE — 85025 COMPLETE CBC W/AUTO DIFF WBC: CPT | Performed by: FAMILY MEDICINE

## 2025-08-08 RX ORDER — PANTOPRAZOLE SODIUM 40 MG/1
40 TABLET, DELAYED RELEASE ORAL
Qty: 28 TABLET | Refills: 0 | Status: SHIPPED | OUTPATIENT
Start: 2025-08-08

## 2025-08-08 ASSESSMENT — PAIN SCALES - GENERAL: PAINLEVEL_OUTOF10: NO PAIN (0)

## 2025-08-11 ENCOUNTER — TELEPHONE (OUTPATIENT)
Dept: FAMILY MEDICINE | Facility: CLINIC | Age: 45
End: 2025-08-11
Payer: COMMERCIAL

## 2025-08-11 DIAGNOSIS — R10.13 ABDOMINAL PAIN, EPIGASTRIC: Primary | ICD-10-CM

## 2025-08-11 DIAGNOSIS — D50.0 IRON DEFICIENCY ANEMIA DUE TO CHRONIC BLOOD LOSS: ICD-10-CM

## 2025-08-15 ENCOUNTER — OFFICE VISIT (OUTPATIENT)
Dept: FAMILY MEDICINE | Facility: CLINIC | Age: 45
End: 2025-08-15
Payer: COMMERCIAL

## 2025-08-15 VITALS
HEIGHT: 60 IN | WEIGHT: 208 LBS | BODY MASS INDEX: 40.84 KG/M2 | OXYGEN SATURATION: 99 % | RESPIRATION RATE: 18 BRPM | DIASTOLIC BLOOD PRESSURE: 85 MMHG | HEART RATE: 66 BPM | SYSTOLIC BLOOD PRESSURE: 123 MMHG | TEMPERATURE: 98.1 F

## 2025-08-15 DIAGNOSIS — F41.0 PANIC DISORDER WITHOUT AGORAPHOBIA: ICD-10-CM

## 2025-08-15 DIAGNOSIS — R10.13 EPIGASTRIC PAIN: Primary | ICD-10-CM

## 2025-08-15 LAB
HGB BLD-MCNC: 12.2 G/DL (ref 11.7–15.7)
MCV RBC AUTO: 83.6 FL (ref 78–100)

## 2025-08-15 PROCEDURE — 3079F DIAST BP 80-89 MM HG: CPT | Performed by: FAMILY MEDICINE

## 2025-08-15 PROCEDURE — 85018 HEMOGLOBIN: CPT | Performed by: FAMILY MEDICINE

## 2025-08-15 PROCEDURE — 3074F SYST BP LT 130 MM HG: CPT | Performed by: FAMILY MEDICINE

## 2025-08-15 PROCEDURE — 36415 COLL VENOUS BLD VENIPUNCTURE: CPT | Performed by: FAMILY MEDICINE

## 2025-08-15 PROCEDURE — 99214 OFFICE O/P EST MOD 30 MIN: CPT | Performed by: FAMILY MEDICINE

## 2025-08-15 RX ORDER — ONDANSETRON 4 MG/1
4 TABLET, ORALLY DISINTEGRATING ORAL EVERY 6 HOURS PRN
COMMUNITY
Start: 2025-08-11

## 2025-08-15 RX ORDER — BUSPIRONE HYDROCHLORIDE 10 MG/1
10 TABLET ORAL 3 TIMES DAILY PRN
Qty: 90 TABLET | Refills: 0 | Status: SHIPPED | OUTPATIENT
Start: 2025-08-15

## 2025-08-15 RX ORDER — SUCRALFATE 1 G/1
1 TABLET ORAL 4 TIMES DAILY
COMMUNITY
Start: 2025-08-11 | End: 2025-08-26

## 2025-08-21 ENCOUNTER — TRANSFERRED RECORDS (OUTPATIENT)
Dept: ADMINISTRATIVE | Facility: CLINIC | Age: 45
End: 2025-08-21

## 2025-08-27 DIAGNOSIS — R10.13 ABDOMINAL PAIN, EPIGASTRIC: ICD-10-CM

## 2025-08-27 RX ORDER — PANTOPRAZOLE SODIUM 40 MG/1
40 TABLET, DELAYED RELEASE ORAL
Qty: 28 TABLET | Refills: 0 | Status: SHIPPED | OUTPATIENT
Start: 2025-08-27

## (undated) DEVICE — EYE PREP BETADINE 5% SOLUTION 30ML 0065-0411-30

## (undated) DEVICE — SU SILK 3-0 TIE 12X30" A304H

## (undated) DEVICE — CLIP HORIZON MED BLUE 002200

## (undated) DEVICE — DRAIN JACKSON PRATT RESERVOIR 100ML SU130-1305

## (undated) DEVICE — DRSG TEGADERM 2 3/8X2 3/4" 1624W

## (undated) DEVICE — NIM ELEC SUBDERMAL NDL PAIRED 2 CHANNEL 8227410

## (undated) DEVICE — STPL SKIN 35W ROTATING HEAD PRW35

## (undated) DEVICE — SPONGE KITTNER 30-101

## (undated) DEVICE — LINEN TOWEL PACK X5 5464

## (undated) DEVICE — PACK ENT MINOR CUSTOM ASC

## (undated) DEVICE — DRSG TEGADERM 4X4 3/4" 1626W

## (undated) DEVICE — SU SILK 2-0 SH 30" K833H

## (undated) DEVICE — ESU GROUND PAD ADULT W/CORD E7507

## (undated) DEVICE — SUCTION MANIFOLD NEPTUNE 2 SYS 1 PORT 702-025-000

## (undated) DEVICE — NIM PROBE NS STD INCR PRASS TIP STRL LF DISP 8225825X

## (undated) DEVICE — SU PROLENE 5-0 PS-3 18" 8681G

## (undated) DEVICE — ESU ELEC BLADE 2.75" COATED/INSULATED E1455

## (undated) DEVICE — RETR ELASTIC STAYS LONE STAR BLUNT DUAL LEAD 3550-1G

## (undated) DEVICE — SU VICRYL 3-0 SH 8X18" UND J864D

## (undated) DEVICE — SURGICEL HEMOSTAT 4X8" 1952

## (undated) DEVICE — SU SILK 4-0 TIE 12X30" A303H

## (undated) DEVICE — RX BACITRACIN OINTMENT 14G 0.5OZ 45802-060-01

## (undated) DEVICE — TRAY PREP DRY SKIN SCRUB 067

## (undated) DEVICE — SOL NACL 0.9% IRRIG 500ML BOTTLE 2F7123

## (undated) DEVICE — CLIP HORIZON SM RED WIDE SLOT 001201

## (undated) DEVICE — GLOVE BIOGEL PI MICRO SZ 6.0 48560

## (undated) DEVICE — DRAIN JACKSON PRATT 07MM FLAT SU130-1310

## (undated) DEVICE — DRSG JAWBRA  95

## (undated) DEVICE — SU SILK 2-0 TIE 12X30" A305H

## (undated) DEVICE — SU SILK 2-0 SH CR 8X18" C012D

## (undated) DEVICE — PREP POVIDONE IODINE SOLUTION 10% 4OZ BOTTLE 29906-004

## (undated) RX ORDER — LIDOCAINE HYDROCHLORIDE 10 MG/ML
INJECTION, SOLUTION EPIDURAL; INFILTRATION; INTRACAUDAL; PERINEURAL
Status: DISPENSED
Start: 2024-11-21

## (undated) RX ORDER — PROPOFOL 10 MG/ML
INJECTION, EMULSION INTRAVENOUS
Status: DISPENSED
Start: 2025-01-14

## (undated) RX ORDER — AMPICILLIN AND SULBACTAM 1; .5 G/1; G/1
INJECTION, POWDER, FOR SOLUTION INTRAMUSCULAR; INTRAVENOUS
Status: DISPENSED
Start: 2025-01-14

## (undated) RX ORDER — LIDOCAINE HYDROCHLORIDE AND EPINEPHRINE 10; 10 MG/ML; UG/ML
INJECTION, SOLUTION INFILTRATION; PERINEURAL
Status: DISPENSED
Start: 2025-01-14

## (undated) RX ORDER — FENTANYL CITRATE 50 UG/ML
INJECTION, SOLUTION INTRAMUSCULAR; INTRAVENOUS
Status: DISPENSED
Start: 2023-02-24

## (undated) RX ORDER — ACETAMINOPHEN 325 MG/1
TABLET ORAL
Status: DISPENSED
Start: 2025-01-14

## (undated) RX ORDER — HYDROMORPHONE HYDROCHLORIDE 1 MG/ML
INJECTION, SOLUTION INTRAMUSCULAR; INTRAVENOUS; SUBCUTANEOUS
Status: DISPENSED
Start: 2025-01-14

## (undated) RX ORDER — FENTANYL CITRATE 50 UG/ML
INJECTION, SOLUTION INTRAMUSCULAR; INTRAVENOUS
Status: DISPENSED
Start: 2024-11-21

## (undated) RX ORDER — FENTANYL CITRATE 50 UG/ML
INJECTION, SOLUTION INTRAMUSCULAR; INTRAVENOUS
Status: DISPENSED
Start: 2025-01-14

## (undated) RX ORDER — REMIFENTANIL HYDROCHLORIDE 1 MG/ML
INJECTION, POWDER, LYOPHILIZED, FOR SOLUTION INTRAVENOUS
Status: DISPENSED
Start: 2025-01-14

## (undated) RX ORDER — AMPICILLIN AND SULBACTAM 2; 1 G/1; G/1
INJECTION, POWDER, FOR SOLUTION INTRAMUSCULAR; INTRAVENOUS
Status: DISPENSED
Start: 2025-01-14

## (undated) RX ORDER — DEXAMETHASONE SODIUM PHOSPHATE 10 MG/ML
INJECTION, SOLUTION INTRAMUSCULAR; INTRAVENOUS
Status: DISPENSED
Start: 2025-01-14